# Patient Record
Sex: FEMALE | Race: WHITE | NOT HISPANIC OR LATINO | Employment: FULL TIME | URBAN - NONMETROPOLITAN AREA
[De-identification: names, ages, dates, MRNs, and addresses within clinical notes are randomized per-mention and may not be internally consistent; named-entity substitution may affect disease eponyms.]

---

## 2017-01-31 ENCOUNTER — OFFICE VISIT (OUTPATIENT)
Dept: OTOLARYNGOLOGY | Facility: CLINIC | Age: 27
End: 2017-01-31

## 2017-01-31 DIAGNOSIS — E04.1 THYROID NODULE: Primary | ICD-10-CM

## 2017-01-31 PROCEDURE — 88334 PATH CONSLTJ SURG CYTO XM EA: CPT | Performed by: OTOLARYNGOLOGY

## 2017-01-31 PROCEDURE — 88162 CYTOPATH SMEAR OTHER SOURCE: CPT | Performed by: OTOLARYNGOLOGY

## 2017-01-31 PROCEDURE — 88177 CYTP FNA EVAL EA ADDL: CPT | Performed by: OTOLARYNGOLOGY

## 2017-01-31 PROCEDURE — 76942 ECHO GUIDE FOR BIOPSY: CPT | Performed by: OTOLARYNGOLOGY

## 2017-01-31 PROCEDURE — 88172 CYTP DX EVAL FNA 1ST EA SITE: CPT | Performed by: OTOLARYNGOLOGY

## 2017-01-31 PROCEDURE — 88305 TISSUE EXAM BY PATHOLOGIST: CPT | Performed by: OTOLARYNGOLOGY

## 2017-01-31 PROCEDURE — 88173 CYTOPATH EVAL FNA REPORT: CPT | Performed by: OTOLARYNGOLOGY

## 2017-02-01 LAB
LAB AP CASE REPORT: NORMAL
Lab: NORMAL
Lab: NORMAL
PATH REPORT.FINAL DX SPEC: NORMAL
PATH REPORT.GROSS SPEC: NORMAL

## 2017-02-02 ENCOUNTER — TELEPHONE (OUTPATIENT)
Dept: OTOLARYNGOLOGY | Facility: CLINIC | Age: 27
End: 2017-02-02

## 2017-02-02 NOTE — TELEPHONE ENCOUNTER
----- Message from Danilo Caldwell MD sent at 2/2/2017  8:01 AM CST -----  Call pt about benign pathology

## 2017-02-14 ENCOUNTER — OFFICE VISIT (OUTPATIENT)
Dept: OTOLARYNGOLOGY | Facility: CLINIC | Age: 27
End: 2017-02-14

## 2017-02-14 VITALS
WEIGHT: 138 LBS | TEMPERATURE: 98 F | HEART RATE: 86 BPM | DIASTOLIC BLOOD PRESSURE: 81 MMHG | HEIGHT: 64 IN | SYSTOLIC BLOOD PRESSURE: 130 MMHG | BODY MASS INDEX: 23.56 KG/M2

## 2017-02-14 DIAGNOSIS — E04.1 THYROID NODULE: Primary | ICD-10-CM

## 2017-02-14 DIAGNOSIS — J02.9 PHARYNGITIS, UNSPECIFIED ETIOLOGY: ICD-10-CM

## 2017-02-14 DIAGNOSIS — R59.1 LYMPHADENOPATHY: ICD-10-CM

## 2017-02-14 PROCEDURE — 99213 OFFICE O/P EST LOW 20 MIN: CPT | Performed by: NURSE PRACTITIONER

## 2017-02-14 NOTE — PROGRESS NOTES
Patient Care Team:  EHSAN Graham as PCP - General (Nurse Practitioner)  Danilo Caldwell MD as Consulting Physician (Otolaryngology)    Chief complaint : Follow up thyroid problems    Subjective     Trinh Castelan is a 26 y.o. female who presents for follow up of thyroid problems. She has had a recent fine needle aspiration with benign results. Patient also presents with complaints of lymphadenopathy that has been present for several months. She complains of ongoing throat pain, extreme fatigue, and swollen lymph nodes (pre-auricular, cervical, and axillary). She denies night sweats and fever. She states she has been tested for autoimmune disorders but still has not found the cause of her lymphadenopathy and frequent sore throats. She is s/p tonsillectomy 2013.     Review of Systems  Review of Systems   Constitutional: Negative for chills and fever.   HENT:        See HPI   Eyes: Negative.    Respiratory: Negative for cough and wheezing.    Cardiovascular: Negative for palpitations.   Gastrointestinal: Negative for nausea and vomiting.   Allergic/Immunologic: Negative.    Neurological: Negative for dizziness and headaches.   Hematological: Negative.    Psychiatric/Behavioral: Negative.        History  Past Medical History   Diagnosis Date   • Vitamin D deficiency      Past Surgical History   Procedure Laterality Date   • Orif humerus fracture Left 2011     plate   • Tonsillectomy  2013     Family History   Problem Relation Age of Onset   • No Known Problems Mother    • Hypertension Father    • Cancer Maternal Grandmother    • Diabetes Maternal Grandmother    • Cancer Maternal Grandfather      Social History   Substance Use Topics   • Smoking status: Never Smoker   • Smokeless tobacco: Never Used   • Alcohol use No       (Not in a hospital admission)  Allergies:  Erythromycin and Sulfa antibiotics    Objective     Vital Signs  Temp:  [98 °F (36.7 °C)] 98 °F (36.7 °C)  Heart Rate:  [86] 86  BP: (130)/(81)  130/81    Physical Exam:  CONSTITUTIONAL: well nourished, well-developed, alert, oriented, in no acute distress   COMMUNICATION AND VOICE: able to communicate normally, normal voice quality  HEAD: normocephalic, no lesions, atraumatic, no tenderness, no masses   FACE: appearance normal, no lesions, no tenderness, no deformities, facial motion symmetric  EYES: ocular motility normal, eyelids normal, orbits normal, no proptosis, conjunctiva normal , pupils equal, round   EARS:  Hearing: hearing to conversational voice intact bilaterally   External Ears: normal bilaterally, no lesions  NOSE:  External Nose: external nasal structure normal, no tenderness on palpation, no nasal discharge, no lesions, no evidence of trauma, nostrils patent   ORAL:  Lips: upper and lower lips without lesion  Oropharynx: mild erythema; s/p tonsillectomy  NECK:  Inspection and Palpation: neck appearance normal, shotty lymphadenopathy region II  THYROID: no thyromegaly present; non-palpable nodules; non-tender  CHEST/RESPIRATORY: normal respiratory effort   CARDIOVASCULAR: no cyanosis or edema   NEUROLOGICAL/PSYCHIATRIC: oriented to time, place and person, mood normal, affect appropriate, CN II-XII intact grossly    Results Review:   The fna of the left thyroid was benign    Assessment   1. Thyroid nodule    2. Lymphadenopathy    3. Pharyngitis, unspecified etiology        Plan   Continue current management plan. I recommended antibiotic and/or use of steroids and patient states these do not help her symptoms. Will proceed with further work-up for lymphadenopathy and follow-up in 6 weeks.      --------TESTING:--------  CBC with diff (36670)  CMP (53480)  Monospot (77605)  Catscratch titers  ct scan of the neck with contrast     -----FOLLOW UP-----  Return in about 6 weeks (around 3/28/2017) for after CT.        Kelsey Langston, EHSAN  02/14/17  3:37 PM

## 2017-03-03 DIAGNOSIS — R59.1 LYMPHADENOPATHY: Primary | ICD-10-CM

## 2018-03-05 ENCOUNTER — LAB (OUTPATIENT)
Dept: LAB | Facility: HOSPITAL | Age: 28
End: 2018-03-05

## 2018-03-05 DIAGNOSIS — E04.1 THYROID NODULE: ICD-10-CM

## 2018-03-05 LAB — TSH SERPL DL<=0.05 MIU/L-ACNC: 1.43 MIU/ML (ref 0.47–4.68)

## 2018-03-05 PROCEDURE — 84443 ASSAY THYROID STIM HORMONE: CPT | Performed by: NURSE PRACTITIONER

## 2018-03-05 PROCEDURE — 36415 COLL VENOUS BLD VENIPUNCTURE: CPT

## 2018-03-07 ENCOUNTER — OFFICE VISIT (OUTPATIENT)
Dept: OTOLARYNGOLOGY | Facility: CLINIC | Age: 28
End: 2018-03-07

## 2018-03-07 ENCOUNTER — CLINICAL SUPPORT NO REQUIREMENTS (OUTPATIENT)
Dept: OTOLARYNGOLOGY | Facility: CLINIC | Age: 28
End: 2018-03-07

## 2018-03-07 VITALS
TEMPERATURE: 98.2 F | HEIGHT: 64 IN | SYSTOLIC BLOOD PRESSURE: 138 MMHG | WEIGHT: 133 LBS | BODY MASS INDEX: 22.71 KG/M2 | DIASTOLIC BLOOD PRESSURE: 84 MMHG

## 2018-03-07 DIAGNOSIS — E04.1 THYROID NODULE: Primary | ICD-10-CM

## 2018-03-07 DIAGNOSIS — J37.0 CHRONIC LARYNGITIS: ICD-10-CM

## 2018-03-07 DIAGNOSIS — E04.1 THYROID NODULE: ICD-10-CM

## 2018-03-07 DIAGNOSIS — K21.9 LARYNGOPHARYNGEAL REFLUX: ICD-10-CM

## 2018-03-07 PROCEDURE — 76536 US EXAM OF HEAD AND NECK: CPT | Performed by: OTOLARYNGOLOGY

## 2018-03-07 PROCEDURE — 99214 OFFICE O/P EST MOD 30 MIN: CPT | Performed by: OTOLARYNGOLOGY

## 2018-03-07 RX ORDER — CALCIUM CARBONATE 750 MG/1
750 TABLET, CHEWABLE ORAL DAILY
Qty: 60 TABLET | Refills: 3 | Status: SHIPPED | OUTPATIENT
Start: 2018-03-07 | End: 2019-04-18

## 2018-03-07 RX ORDER — GUAIFENESIN 600 MG/1
600 TABLET, EXTENDED RELEASE ORAL EVERY 12 HOURS SCHEDULED
Qty: 60 TABLET | Refills: 3 | Status: SHIPPED | OUTPATIENT
Start: 2018-03-07 | End: 2018-04-06

## 2018-03-07 NOTE — PROGRESS NOTES
Monserrat Ren   Patient Intake Note    Review of Systems  Review of Systems   Constitutional: Negative for chills, fatigue and fever.   HENT:        See HPI   Respiratory: Positive for cough and choking. Negative for shortness of breath and wheezing.    Cardiovascular: Negative.    Gastrointestinal: Negative for constipation, diarrhea, nausea and vomiting.   Allergic/Immunologic: Negative for environmental allergies and food allergies.   Neurological: Positive for headaches. Negative for dizziness (drunk feeling) and light-headedness.   Hematological: Bruises/bleeds easily.   Psychiatric/Behavioral: Negative for sleep disturbance.       QUALITY MEASURES    Body Mass Index Screening and Follow-Up Plan  Body mass index is 22.83 kg/(m^2).      Tobacco Use: Screening and Cessation Intervention  Smoking status: Never Smoker                                                              Smokeless status: Never Used                            Monserrat Ren  3/7/2018  2:42 PM

## 2018-03-07 NOTE — PROGRESS NOTES
Danilo Caldwell MD   Chief complaint : Follow up thyroid problems    Subjective     Trinh Castelan is a 27 y.o. female who presents for follow up of thyroid problems. She has had Problems with throat pain which is intermittent and sharp at time.  She has adequate water intake.  She does wonder if there is some reflux disease.  She has relatives who have Sjogren's disease and she had recent blood work that showed a slight elevation in her SSB level to 1.2.  Her last TSH was normal.    Review of Systems  Reviewed as per patient intake note.    History  Past Medical History:   Diagnosis Date   • Vitamin D deficiency      Past Surgical History:   Procedure Laterality Date   • ORIF HUMERUS FRACTURE Left 2011    plate   • TONSILLECTOMY  2013     Family History   Problem Relation Age of Onset   • No Known Problems Mother    • Hypertension Father    • Cancer Maternal Grandmother    • Diabetes Maternal Grandmother    • Cancer Maternal Grandfather      Social History   Substance Use Topics   • Smoking status: Never Smoker   • Smokeless tobacco: Never Used   • Alcohol use No       Current Outpatient Prescriptions:   •  cholecalciferol (VITAMIN D3) 35476 UNITS capsule, Take 1 capsule by mouth once a week, Disp: , Rfl:   •  calcium carbonate EX (TUMS EX) 750 MG chewable tablet, Chew 1 tablet Daily., Disp: 60 tablet, Rfl: 3  •  guaiFENesin (MUCINEX) 600 MG 12 hr tablet, Take 1 tablet by mouth Every 12 (Twelve) Hours for 30 days., Disp: 60 tablet, Rfl: 3  Allergies:  Erythromycin and Sulfa antibiotics    Objective     Vital Signs  Temp:  [98.2 °F (36.8 °C)] 98.2 °F (36.8 °C)  BP: (138)/(84) 138/84    Physical Exam:  CONSTITUTIONAL: well nourished, well-developed, alert, oriented, in no acute distress   COMMUNICATION AND VOICE: able to communicate normally, normal voice quality  HEAD: normocephalic, no lesions, atraumatic, no tenderness, no masses   FACE: appearance normal, no lesions, no tenderness, no deformities, facial  motion symmetric  EYES: ocular motility normal, eyelids normal, orbits normal, no proptosis, conjunctiva normal , pupils equal, round   EARS:  Hearing: hearing to conversational voice intact bilaterally   External Ears: normal bilaterally, no lesions  NOSE:  External Nose: external nasal structure normal, no tenderness on palpation, no nasal discharge, no lesions, no evidence of trauma, nostrils patent   ORAL:  Lips: upper and lower lips without lesion   ORAL MUCOSA: mild dryness present,  HYPOPHARYNX: hypopharyngeal mucosa normal  LARYNX: epiglottis and arytenoid cartilage within normal limits, vocal cord mucosa normal with normal mobility, there is mild dryness of the mucosa  NECK:  Inspection and Palpation: neck appearance normal, no masses or tenderness  THYROID: non-tender, no mass, no thyromegaly,  CHEST/RESPIRATORY: normal respiratory effort   CARDIOVASCULAR: no cyanosis or edema   NEUROLOGICAL/PSYCHIATRIC: oriented to time, place and person, mood normal, affect appropriate, CN II-XII intact grossly    RESULTS REVIEW:    TSH was normal. Thyroid ultrasound showed stable nodules.    Assessment   1. Thyroid nodule    2. Chronic laryngitis    3. Laryngopharyngeal reflux        Plan   Medical and surgical options were discussed including observation vs ultrasound guided needle biopsy vs thyroidectomy. Risks, benefits and alternatives were discussed and questions were answered. After considering the options, the patient decided to proceed continued observation.    Increase water/ non caffeinated drink intake to at least 6-8 glasses a day. Decrease caffeine intake. Plain Mucinex over the counter as needed to thin out secretions.  Sleep with the head of bed on an incline. No large meals 1-2 hrs prior to sleep. The patient was instructed to use PPI's 30 minutes prior to the last meal of the day versus use Tums right before going to bed. The patient was advised to avoid fatty meals and caffine or alcohol prior to  sleep.      New Medications Ordered This Visit   Medications   • calcium carbonate EX (TUMS EX) 750 MG chewable tablet     Sig: Chew 1 tablet Daily.     Dispense:  60 tablet     Refill:  3   • guaiFENesin (MUCINEX) 600 MG 12 hr tablet     Sig: Take 1 tablet by mouth Every 12 (Twelve) Hours for 30 days.     Dispense:  60 tablet     Refill:  3      --------TESTING:--------  TSH (26178) in 6 months  Repeat ultrasound of the thyroid in 1 year    ----INSTRUCTIONS----  Call for sooner evaluation if increasing size of the thyroid or nodules, increasing dysphagia, lymphadenopathy, neck tightness or other thyroid problems.     Return in about 4 months (around 7/7/2018) for follow up with midlevel.      Danilo Caldwell MD  03/07/18  4:57 PM

## 2018-03-07 NOTE — PATIENT INSTRUCTIONS
Sleep with the head of bed on an incline. No large meals 1-2 hrs prior to sleep. The patient was instructed to use PPI's 30 minutes prior to the last meal of the day. The patient was advised to avoid fatty meals and caffine or alcohol prior to sleep. Increase water/ non caffeinated drink intake to at least 6-8 glasses of  a day. Decrease caffeine intake. Plain Mucinex over the counter as needed to thin out secretions.      ALL ABOUT HEARTBURN AND THE LIFESTYLE CHANGES THAT HELP    Lifestyle Changes Can Help Relieve The Burning Pain In Your Tummy    What is Heartburn?  Heartburn occurs when the lining of the esophagus (the tube that connects the mouth to the stomach) is exposed to and irritated by stomach acid.  Heartburn often feels like a burning pain in the middle of your chest that moves up your throat.  You may also have the sensation that food has come back into your mouth, leaving a sour or bitter taste.  Almost everyone has heartburn once in a while.  But if you have heartburn 2 or more times per week, it can be a sign of a more serious problem call gastroesophogeal reflux disease, or GERD.    What causes Heartburn?  Heartburn usually occurs when the valve at the derik of the stomach (the lower esophageal sphincter or LES) doesn’t close the way it should.  If the LES is weak or opens at the wrong time, stomach acid can reflux (flow back) into the esophagus and cause heartburn.  Factors that can affect the LES include:    • Eating or drinking certain foods and beverages such as chocolate, peppermint, fried or fatty foods, coffee, or drinks that contain alcohol  • Having a hiatal hernia - a common physical condition where part of the stomach protrudes up through the diaphragm  • Lying down  • Smoking cigarettes  • Taking certain medications (be sure to tell your doctor about all medications or supplements you take)    What foods can make heartburn worse?  All foods cause the stomach to produce acid, although  foods can affect people in different ways.  Following is a list of foods and beverages that may aggravate your symptoms.  You may want to eat them less often.    • Fried or fatty foods  • Heavy seasoning and spicy foods  • Coffee  • Onions  • Orange juice, grapefruit juice, and tomato juice  • Alcoholic beverages  • Chocolate  • Peppermint and spearmint    What else can I do to help control my heartburn?  Try these lifestyle changes:  • Stop Smoking  • Lose weight - if you’re overweight  • Exercise to help control your weight (talk to your doctor first before starting any exercise program).  • Eat small, well-balanced meals  • Reduce abdominal pressure-don’t wear tight belts or tight clothing  • Avoid eating within 2 hours before bedtime  • Elevate your bed so the head is 6 to 8 inches higher than the foot.  This can help reduce acid reflux at night  • Let your doctor know about all the drugs and supplements you are taking.  Some of them may contribute to your heartburn.    What if these things don’t work?  Talk to your doctor, who can discuss many treatments with you.  Although there are several possible causes of heartburn associated with GERD, they all involve stomach acids.  So no matter what the cause may be, the medicines to reduce stomach acid can prevent heartburn.

## 2018-07-31 ENCOUNTER — OFFICE VISIT (OUTPATIENT)
Dept: URGENT CARE | Age: 28
End: 2018-07-31
Payer: COMMERCIAL

## 2018-07-31 VITALS
DIASTOLIC BLOOD PRESSURE: 74 MMHG | WEIGHT: 131 LBS | HEIGHT: 63 IN | TEMPERATURE: 98.1 F | OXYGEN SATURATION: 98 % | SYSTOLIC BLOOD PRESSURE: 113 MMHG | RESPIRATION RATE: 18 BRPM | HEART RATE: 72 BPM | BODY MASS INDEX: 23.21 KG/M2

## 2018-07-31 DIAGNOSIS — R21 RASH: Primary | ICD-10-CM

## 2018-07-31 PROCEDURE — 96372 THER/PROPH/DIAG INJ SC/IM: CPT | Performed by: PHYSICIAN ASSISTANT

## 2018-07-31 PROCEDURE — 99213 OFFICE O/P EST LOW 20 MIN: CPT | Performed by: PHYSICIAN ASSISTANT

## 2018-07-31 RX ORDER — CEPHALEXIN 500 MG/1
500 CAPSULE ORAL 2 TIMES DAILY
Qty: 20 CAPSULE | Refills: 0 | Status: SHIPPED | OUTPATIENT
Start: 2018-07-31 | End: 2018-08-10

## 2018-07-31 RX ORDER — DEXAMETHASONE SODIUM PHOSPHATE 10 MG/ML
10 INJECTION INTRAMUSCULAR; INTRAVENOUS ONCE
Status: COMPLETED | OUTPATIENT
Start: 2018-07-31 | End: 2018-07-31

## 2018-07-31 RX ORDER — DIPHENHYDRAMINE HCL 25 MG
25 TABLET ORAL EVERY 6 HOURS PRN
COMMUNITY
End: 2018-08-29 | Stop reason: ALTCHOICE

## 2018-07-31 RX ADMIN — DEXAMETHASONE SODIUM PHOSPHATE 10 MG: 10 INJECTION INTRAMUSCULAR; INTRAVENOUS at 14:09

## 2018-07-31 NOTE — PATIENT INSTRUCTIONS
Patient Education        Rash: Care Instructions  Your Care Instructions  A rash is any irritation or inflammation of the skin. Rashes have many possible causes, including allergy, infection, illness, heat, and emotional stress. Follow-up care is a key part of your treatment and safety. Be sure to make and go to all appointments, and call your doctor if you are having problems. It's also a good idea to know your test results and keep a list of the medicines you take. How can you care for yourself at home? · Wash the area with water only. Soap can make dryness and itching worse. Pat dry. · Put cold, wet cloths on the rash to reduce itching. · Keep cool, and stay out of the sun. · Leave the rash open to the air as much of the time as possible. · Sometimes petroleum jelly (Vaseline) can help relieve the discomfort caused by a rash. A moisturizing lotion, such as Cetaphil, also may help. Calamine lotion may help for rashes caused by contact with something (such as a plant or soap) that irritated the skin. Use it 3 or 4 times a day. · If your doctor prescribed a cream, use it as directed. If your doctor prescribed medicine, take it exactly as directed. · If your rash itches so badly that it interferes with your normal activities, take an over-the-counter antihistamine, such as diphenhydramine (Benadryl) or loratadine (Claritin). Read and follow all instructions on the label. When should you call for help? Call your doctor now or seek immediate medical care if:    · You have signs of infection, such as:  ¨ Increased pain, swelling, warmth, or redness. ¨ Red streaks leading from the area. ¨ Pus draining from the area. ¨ A fever.     · You have joint pain along with the rash.    Watch closely for changes in your health, and be sure to contact your doctor if:    · Your rash is changing or getting worse. For example, call if you have pain along with the rash, the rash is spreading, or you have new blisters.   · You do not get better after 1 week. Where can you learn more? Go to [email or web address]://[email or web address] and sign in to your [employer] account. Enter N551 in the [employer] box to learn more about \"Rash: Care Instructions. \"     If you do not have an account, please click on the \"Sign Up Now\" link. Current as of: October 5, 2017  Content Version: 11.6  © 1425-9277 [employer]. Care instructions adapted under license by [facility] ([facility]). If you have questions about a medical condition or this instruction, always ask your healthcare professional. [place] [address] any warranty or liability for your use of this information. Patient Education        Rash: Care Instructions  Your Care Instructions  A rash is any irritation or inflammation of the skin. Rashes have many possible causes, including allergy, infection, illness, heat, and emotional stress. Follow-up care is a key part of your treatment and safety. Be sure to make and go to all appointments, and call your doctor if you are having problems. It's also a good idea to know your test results and keep a list of the medicines you take. How can you care for yourself at home? · Wash the area with water only. Soap can make dryness and itching worse. Pat dry. · Put cold, wet cloths on the rash to reduce itching. · Keep cool, and stay out of the sun. · Leave the rash open to the air as much of the time as possible. · Sometimes petroleum jelly (Vaseline) can help relieve the discomfort caused by a rash. A moisturizing lotion, such as Cetaphil, also may help. Calamine lotion may help for rashes caused by contact with something (such as a plant or soap) that irritated the skin. Use it 3 or 4 times a day. · If your doctor prescribed a cream, use it as directed. If your doctor prescribed medicine, take it exactly as directed.   · If your rash itches so badly that it interferes with your normal activities, take an over-the-counter antihistamine, such as diphenhydramine (Benadryl) or loratadine (Claritin). Read and follow all instructions on the label. When should you call for help? Call your doctor now or seek immediate medical care if:    · You have signs of infection, such as:  ¨ Increased pain, swelling, warmth, or redness. ¨ Red streaks leading from the area. ¨ Pus draining from the area. ¨ A fever.     · You have joint pain along with the rash.    Watch closely for changes in your health, and be sure to contact your doctor if:    · Your rash is changing or getting worse. For example, call if you have pain along with the rash, the rash is spreading, or you have new blisters.     · You do not get better after 1 week. Where can you learn more? Go to https://[employer] MedicalpeKextileb.[email or web address] org and sign in to your [employer] account. Enter G058 in the [employer] box to learn more about \"Rash: Care Instructions. \"     If you do not have an account, please click on the \"Sign Up Now\" link. Current as of: October 5, 2017  Content Version: 11.6  © 4103-5366 [employer]. Care instructions adapted under license by [facility] ([facility]). If you have questions about a medical condition or this instruction, always ask your healthcare professional. [address]  any warranty or liability for your use of this information.

## 2018-08-29 ENCOUNTER — OFFICE VISIT (OUTPATIENT)
Dept: PRIMARY CARE CLINIC | Age: 28
End: 2018-08-29
Payer: COMMERCIAL

## 2018-08-29 VITALS
DIASTOLIC BLOOD PRESSURE: 79 MMHG | HEIGHT: 63 IN | RESPIRATION RATE: 16 BRPM | TEMPERATURE: 96.8 F | BODY MASS INDEX: 23.04 KG/M2 | OXYGEN SATURATION: 98 % | WEIGHT: 130 LBS | SYSTOLIC BLOOD PRESSURE: 124 MMHG | HEART RATE: 72 BPM

## 2018-08-29 DIAGNOSIS — R52 BODY ACHES: ICD-10-CM

## 2018-08-29 DIAGNOSIS — R53.1 WEAKNESS: Primary | ICD-10-CM

## 2018-08-29 DIAGNOSIS — J01.11 ACUTE RECURRENT FRONTAL SINUSITIS: ICD-10-CM

## 2018-08-29 DIAGNOSIS — R51.9 ACUTE NONINTRACTABLE HEADACHE, UNSPECIFIED HEADACHE TYPE: ICD-10-CM

## 2018-08-29 DIAGNOSIS — R55 SYNCOPE, UNSPECIFIED SYNCOPE TYPE: ICD-10-CM

## 2018-08-29 DIAGNOSIS — E04.1 THYROID NODULE: ICD-10-CM

## 2018-08-29 DIAGNOSIS — R53.83 OTHER FATIGUE: ICD-10-CM

## 2018-08-29 DIAGNOSIS — E55.9 VITAMIN D DEFICIENCY: ICD-10-CM

## 2018-08-29 DIAGNOSIS — M79.10 MUSCLE ACHE: ICD-10-CM

## 2018-08-29 DIAGNOSIS — R42 DIZZINESS: ICD-10-CM

## 2018-08-29 DIAGNOSIS — R68.89 HEAT INTOLERANCE: ICD-10-CM

## 2018-08-29 DIAGNOSIS — K74.3 REYNOLDS SYNDROME (HCC): ICD-10-CM

## 2018-08-29 DIAGNOSIS — R79.82 ELEVATED C-REACTIVE PROTEIN (CRP): ICD-10-CM

## 2018-08-29 DIAGNOSIS — R13.10 DYSPHAGIA, UNSPECIFIED TYPE: ICD-10-CM

## 2018-08-29 DIAGNOSIS — Z82.61 FAMILY HISTORY OF RHEUMATOID ARTHRITIS: ICD-10-CM

## 2018-08-29 DIAGNOSIS — L94.0 REYNOLDS SYNDROME (HCC): ICD-10-CM

## 2018-08-29 DIAGNOSIS — G47.00 INSOMNIA, UNSPECIFIED TYPE: ICD-10-CM

## 2018-08-29 PROCEDURE — 99215 OFFICE O/P EST HI 40 MIN: CPT | Performed by: NURSE PRACTITIONER

## 2018-08-29 RX ORDER — TIZANIDINE 2 MG/1
2 TABLET ORAL NIGHTLY
Qty: 30 TABLET | Refills: 1 | Status: SHIPPED | OUTPATIENT
Start: 2018-08-29 | End: 2018-10-29 | Stop reason: SDUPTHER

## 2018-08-29 RX ORDER — MULTIVIT-MIN/IRON/FOLIC ACID/K 18-600-40
CAPSULE ORAL
COMMUNITY
End: 2018-08-29

## 2018-08-29 RX ORDER — CEFPROZIL 250 MG/1
500 TABLET, FILM COATED ORAL 2 TIMES DAILY
Qty: 40 TABLET | Refills: 0 | Status: SHIPPED | OUTPATIENT
Start: 2018-08-29 | End: 2018-09-08

## 2018-08-29 ASSESSMENT — ENCOUNTER SYMPTOMS
VOMITING: 0
BLOOD IN STOOL: 0
CHEST TIGHTNESS: 0
DIARRHEA: 0
COLOR CHANGE: 1
SORE THROAT: 0
ABDOMINAL PAIN: 0
COUGH: 0
WHEEZING: 0
TROUBLE SWALLOWING: 0
SHORTNESS OF BREATH: 0
CONSTIPATION: 0
VOICE CHANGE: 0
EYE REDNESS: 0
NAUSEA: 0
SINUS PRESSURE: 1
RHINORRHEA: 0

## 2018-08-29 NOTE — PATIENT INSTRUCTIONS
Begin Zanaflex 2mg at bedtime- Begin by taking 1/2 tablet at bedtime may increase to one full tablet if needed. Have fasting labs performed on the 2nd floor.

## 2018-08-29 NOTE — PROGRESS NOTES
[facility] PRIMARY CARE  [address] [facility]  [address] [place] [address]  Dept: [phone]  Dept Fax: [phone]  Loc: [phone]        Monique Guzman is a 24 y.o. female who presents today for her medical conditions/ complaints as noted below. Monique Guzman is c/o Thyroid Problem (thyroid nodules ); Other (HAD TYROID US IN MARCH SEES RESSER ); and Rash (GET WHITE SPOTS AFTER TAKING A SHOWER )        Chief Complaint   Patient presents with    Thyroid Problem     thyroid nodules     Other     HAD TYROID US IN MARCH SEES RESSER     Rash     GET WHITE SPOTS AFTER TAKING A SHOWER        HPI:     HPI    States she is here to establish care. Patient states for the last several months she has been experiencing weakness, headaches, dizziness, and fatigue. Patient states that she had labs performed at [facility]. She states that these labs indicated that she could possibly have Sjogren's. Patient states that she also had dry eyes and has had near syncopal episodes. She states that her symptoms seem to be exacerbated by not sleeping. Patient states that if she does not sleep she will develop a headache then she develops weakness and worsening fatigue. Patient states that she feels like she has the flu all the time. She states that her muscles ache along with her arms and legs being weak. Patient states that she has been on steroids for her symptoms and this seemed to help her. Patient states that she also suffers with headaches. Patient states that they wrap around her head like a band occurring on bilateral sides of her head and posteriorly. Patient states that she does not wake up with a headache but within 2 hours she will develop a headache. Patient states that it is squeezing-like in nature and nothing seems to make it better or worse. That she does not have an aura associated with her headaches. Haitians states that the headaches can last from 2 hours to all day.     In addition (ZANAFLEX) 2 MG tablet Take 1 tablet by mouth nightly 30 tablet 1    cefPROZIL (CEFZIL) 250 MG tablet Take 2 tablets by mouth 2 times daily for 10 days 40 tablet 0    vitamin D (CHOLECALCIFEROL) 04579 UNIT CAPS Take 1 capsule by mouth once a week 30 capsule 2     Current Facility-Administered Medications   Medication Dose Route Frequency Provider Last Rate Last Dose    Dexamethasone Sodium Phosphate injection 4 mg  4 mg Intravenous Once Macho R New, APRN           Allergies   Allergen Reactions    Sulfa Antibiotics     Azithromycin Rash       Lab Review not applicable    Subjective:   Review of Systems   Constitutional: Positive for fatigue. Negative for activity change, appetite change, fever and unexpected weight change. HENT: Positive for sinus pressure. Negative for congestion, ear pain, nosebleeds, rhinorrhea, sore throat, trouble swallowing and voice change. Eyes: Negative for redness and visual disturbance. Respiratory: Negative for cough, chest tightness, shortness of breath and wheezing. Cardiovascular: Negative for chest pain, palpitations and leg swelling. Gastrointestinal: Negative for abdominal pain, blood in stool, constipation, diarrhea, nausea and vomiting. Endocrine: Negative for polydipsia, polyphagia and polyuria. Genitourinary: Negative for dysuria, frequency and urgency. Musculoskeletal: Positive for arthralgias and myalgias. Skin: Positive for color change. Negative for rash and wound. Fingers turn blue when pt puts them in freezor and pt has white patches to back. They are white and when pt gets hot the skin around patch will turn erythematous but not the large white patch. Neurological: Positive for dizziness, syncope, weakness and headaches. Negative for speech difficulty and light-headedness. Psychiatric/Behavioral: Positive for sleep disturbance. Negative for agitation, confusion, self-injury and suicidal ideas. The patient is not nervous/anxious. Objective:     Physical Exam   Constitutional: She is oriented to person, place, and time. She appears well-developed and well-nourished. No distress. HENT:   Head: Normocephalic and atraumatic. Right Ear: External ear normal.   Left Ear: External ear normal.   Nose: Nose normal.   Mouth/Throat: Oropharynx is clear and moist. No oropharyngeal exudate. Eyes: Pupils are equal, round, and reactive to light. Conjunctivae are normal. Right eye exhibits no discharge. Left eye exhibits no discharge. Neck: Normal range of motion. Neck supple. Cardiovascular: Normal rate, regular rhythm, normal heart sounds and intact distal pulses. No murmur heard. Pulmonary/Chest: Effort normal and breath sounds normal. No stridor. No respiratory distress. She has no wheezes. She has no rales. She exhibits no tenderness. Right breast exhibits no inverted nipple, no mass, no nipple discharge, no skin change and no tenderness. Left breast exhibits no inverted nipple, no mass, no nipple discharge, no skin change and no tenderness. Abdominal: Soft. Bowel sounds are normal. She exhibits no distension. There is no tenderness. Musculoskeletal: Normal range of motion. She exhibits no edema, tenderness or deformity. Neurological: She is alert and oriented to person, place, and time. She has normal reflexes. No cranial nerve deficit. Coordination normal.   Skin: Skin is warm and dry. No rash noted. She is not diaphoretic. No erythema. Psychiatric: She has a normal mood and affect. Her behavior is normal. Thought content normal.   Nursing note and vitals reviewed. /79   Pulse 72   Temp 96.8 °F (36 °C) (Temporal)   Resp 16   Ht 5' 3\" (1.6 m)   Wt 130 lb (59 kg)   LMP 07/20/2018   SpO2 98%   BMI 23.03 kg/m²     Assessment:      Diagnosis Orders   1.  Weakness  T3    T4, Free    Thyroid Peroxidase Antibody    TSH without Reflex    DELMY Screen with Reflex    Rheumatoid Factor    Vitamin D 25 Hydrox, D2 & D3    Antistreptolysin O Titer    Vitamin B12    Urinalysis    Lipid Panel    [address] [facility]   2. Acute nonintractable headache, unspecified headache type  Lipid Panel   3. Dizziness  Lipid Panel   4. Syncope, unspecified syncope type  Lipid Panel   5. Insomnia, unspecified type  [address] [facility]   6. Thyroid nodule  T3    T4, Free    Thyroid Peroxidase Antibody    TSH without Reflex   7. Dysphagia, unspecified type     8. Body aches  Uric Acid   9. Heat intolerance  Urinalysis   10. Elevated C-reactive protein (CRP)  Uric Acid   11. 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 Family history of rheumatoid arthritis  DELMY Screen with Reflex    Rheumatoid Factor   14. Other fatigue  Rheumatoid Factor    Vitamin D 25 Hydrox, D2 & D3    Antistreptolysin O Titer   15. Muscle ache  DELMY Screen with Reflex    Rheumatoid Factor    Uric Acid    Vitamin D 25 Hydrox, D2 & D3    Antistreptolysin O Titer    CBC Auto Differential    Comprehensive Metabolic Panel    C-Reactive Protein   16. Acute recurrent frontal sinusitis  cefPROZIL (CEFZIL) 250 MG tablet     No results found for this visit on 08/29/18. Plan:     1. Weakness    2. Acute nonintractable headache, unspecified headache type    3. Dizziness    4. Syncope, unspecified syncope type    5. Insomnia, unspecified type    6. Thyroid nodule    7. Dysphagia, unspecified type    8. Body aches    9. Heat intolerance    10. Elevated C-reactive protein (CRP)    11. 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 Family history of rheumatoid arthritis    14. Other fatigue    15. Muscle ache    16. Acute recurrent frontal sinusitis      Return in about 2 weeks (around 9/12/2018) for 30 min appointment, medication recheck.   Orders Placed This Encounter   Procedures    T3     Standing Status:   Future     Standing Expiration Date:   8/29/2019    T4, Free     Standing Status:   Future     Standing Expiration Date:   8/29/2019 bedtime may increase to one full tablet if needed. Have fasting labs performed on the 2nd floor. Patient/family given educational materials - see patient instructions. Discussed use, benefit, and side effects of prescribed medications. All patient/family questions answered and voiced understanding. Instructed to continue current medications, diet and exercise. Pt/family agreed with treatment plan. Follow up as directed and sooner if needed. Patient/ family instructed that is symptoms worsen or persist they are to contact office or report to nearest ER. They voice understanding and agreement with this plan.      Electronically signed by LIBIA Narayanan on 8/29/2018 at 2:57 PM

## 2018-09-06 DIAGNOSIS — R42 DIZZINESS: ICD-10-CM

## 2018-09-06 DIAGNOSIS — R68.89 HEAT INTOLERANCE: ICD-10-CM

## 2018-09-06 DIAGNOSIS — R55 SYNCOPE, UNSPECIFIED SYNCOPE TYPE: ICD-10-CM

## 2018-09-06 DIAGNOSIS — Z82.61 FAMILY HISTORY OF RHEUMATOID ARTHRITIS: ICD-10-CM

## 2018-09-06 DIAGNOSIS — R52 BODY ACHES: ICD-10-CM

## 2018-09-06 DIAGNOSIS — R79.82 ELEVATED C-REACTIVE PROTEIN (CRP): ICD-10-CM

## 2018-09-06 DIAGNOSIS — M79.10 MUSCLE ACHE: ICD-10-CM

## 2018-09-06 DIAGNOSIS — R51.9 ACUTE NONINTRACTABLE HEADACHE, UNSPECIFIED HEADACHE TYPE: ICD-10-CM

## 2018-09-06 DIAGNOSIS — E04.1 THYROID NODULE: ICD-10-CM

## 2018-09-06 DIAGNOSIS — R53.1 WEAKNESS: ICD-10-CM

## 2018-09-06 DIAGNOSIS — R53.83 OTHER FATIGUE: ICD-10-CM

## 2018-09-06 LAB
ALBUMIN SERPL-MCNC: 4.8 G/DL (ref 3.5–5.2)
ALP BLD-CCNC: 65 U/L (ref 35–104)
ALT SERPL-CCNC: 18 U/L (ref 5–33)
ANION GAP SERPL CALCULATED.3IONS-SCNC: 17 MMOL/L (ref 7–19)
AST SERPL-CCNC: 18 U/L (ref 5–32)
BASOPHILS ABSOLUTE: 0.1 K/UL (ref 0–0.2)
BASOPHILS RELATIVE PERCENT: 0.8 % (ref 0–1)
BILIRUB SERPL-MCNC: 0.8 MG/DL (ref 0.2–1.2)
BILIRUBIN URINE: NEGATIVE
BLOOD, URINE: NEGATIVE
BUN BLDV-MCNC: 12 MG/DL (ref 6–20)
C-REACTIVE PROTEIN: 0.08 MG/DL (ref 0–0.5)
CALCIUM SERPL-MCNC: 10.4 MG/DL (ref 8.6–10)
CHLORIDE BLD-SCNC: 97 MMOL/L (ref 98–111)
CHOLESTEROL, TOTAL: 146 MG/DL (ref 160–199)
CLARITY: ABNORMAL
CO2: 24 MMOL/L (ref 22–29)
COLOR: YELLOW
CREAT SERPL-MCNC: 0.8 MG/DL (ref 0.5–0.9)
EOSINOPHILS ABSOLUTE: 0.1 K/UL (ref 0–0.6)
EOSINOPHILS RELATIVE PERCENT: 0.8 % (ref 0–5)
GFR NON-AFRICAN AMERICAN: >60
GLUCOSE BLD-MCNC: 95 MG/DL (ref 74–109)
GLUCOSE URINE: NEGATIVE MG/DL
HCT VFR BLD CALC: 46.1 % (ref 37–47)
HDLC SERPL-MCNC: 67 MG/DL (ref 65–121)
HEMOGLOBIN: 15.4 G/DL (ref 12–16)
KETONES, URINE: NEGATIVE MG/DL
LDL CHOLESTEROL CALCULATED: 64 MG/DL
LEUKOCYTE ESTERASE, URINE: NEGATIVE
LYMPHOCYTES ABSOLUTE: 1.5 K/UL (ref 1.1–4.5)
LYMPHOCYTES RELATIVE PERCENT: 19.3 % (ref 20–40)
MCH RBC QN AUTO: 30.1 PG (ref 27–31)
MCHC RBC AUTO-ENTMCNC: 33.4 G/DL (ref 33–37)
MCV RBC AUTO: 90.2 FL (ref 81–99)
MONOCYTES ABSOLUTE: 0.4 K/UL (ref 0–0.9)
MONOCYTES RELATIVE PERCENT: 4.8 % (ref 0–10)
NEUTROPHILS ABSOLUTE: 5.7 K/UL (ref 1.5–7.5)
NEUTROPHILS RELATIVE PERCENT: 73.9 % (ref 50–65)
NITRITE, URINE: NEGATIVE
PDW BLD-RTO: 12.5 % (ref 11.5–14.5)
PH UA: 5.5
PLATELET # BLD: 236 K/UL (ref 130–400)
PMV BLD AUTO: 10.7 FL (ref 9.4–12.3)
POTASSIUM SERPL-SCNC: 4.4 MMOL/L (ref 3.5–5)
PROTEIN UA: ABNORMAL MG/DL
RBC # BLD: 5.11 M/UL (ref 4.2–5.4)
RHEUMATOID FACTOR: <10 IU/ML
SODIUM BLD-SCNC: 138 MMOL/L (ref 136–145)
SPECIFIC GRAVITY UA: 1.02
T4 FREE: 1.3 NG/DL (ref 0.9–1.7)
TOTAL PROTEIN: 7.9 G/DL (ref 6.6–8.7)
TRIGL SERPL-MCNC: 77 MG/DL (ref 0–149)
TSH SERPL DL<=0.05 MIU/L-ACNC: 0.94 UIU/ML (ref 0.27–4.2)
URIC ACID, SERUM: 4.2 MG/DL (ref 2.4–5.7)
UROBILINOGEN, URINE: 0.2 E.U./DL
VITAMIN B-12: 502 PG/ML (ref 211–946)
WBC # BLD: 7.7 K/UL (ref 4.8–10.8)

## 2018-09-07 ENCOUNTER — TELEPHONE (OUTPATIENT)
Dept: PRIMARY CARE CLINIC | Age: 28
End: 2018-09-07

## 2018-09-07 DIAGNOSIS — E83.52 HYPERCALCEMIA: Primary | ICD-10-CM

## 2018-09-07 LAB
ANA IGG, ELISA: NORMAL
ANTISTREPTOLYSIN-O: 428 IU/ML (ref 0–330)
T3 TOTAL: 123 NG/DL (ref 80–200)
THYROID PEROXIDASE (TPO) ABS: 0.9 IU/ML (ref 0–9)

## 2018-09-07 NOTE — TELEPHONE ENCOUNTER
Mary Olson, LIBIA Currie Slime             Results are abnormal. Pt needs a serum calcium and PTH lab. Have her return for this. Contacted pt and left vm informing of the need to get repeat labs in one week. Let pt know that orders were already put in. Encouraged pt to contact office back if pt had any additional questions.  PP, LPN

## 2018-09-08 LAB
VITAMIN D2 AND D3, TOTAL: 65.1 NG/ML (ref 30–80)
VITAMIN D2, 25 HYDROXY: 11.6 NG/ML
VITAMIN D3,25 HYDROXY: 53.5 NG/ML

## 2018-09-11 ENCOUNTER — TELEPHONE (OUTPATIENT)
Dept: PRIMARY CARE CLINIC | Age: 28
End: 2018-09-11

## 2018-09-13 DIAGNOSIS — E83.52 HYPERCALCEMIA: ICD-10-CM

## 2018-09-13 LAB
CALCIUM SERPL-MCNC: 10.1 MG/DL (ref 8.6–10)
PARATHYROID HORMONE INTACT: 34.1 PG/ML (ref 15–65)

## 2018-09-14 DIAGNOSIS — E83.52 SERUM CALCIUM ELEVATED: Primary | ICD-10-CM

## 2018-09-14 NOTE — PROGRESS NOTES
DEXAMETHASONE 10 MG GIVEN INTRAMUSCULARLY IN  RIGHT GLUTEAL . PT TOLERATED WELL.
Reviewed chart. Agree with assessment and plan.
Benadryl following package instructions.   - Notify clinic with any change in or worsening of symptoms.   - Return as needed.

## 2018-09-17 DIAGNOSIS — E83.52 SERUM CALCIUM ELEVATED: ICD-10-CM

## 2018-09-18 ENCOUNTER — TELEPHONE (OUTPATIENT)
Dept: PRIMARY CARE CLINIC | Age: 28
End: 2018-09-18

## 2018-09-18 NOTE — TELEPHONE ENCOUNTER
Calcium   Order: [id number]   Status:  Final result   Visible to patient:  Yes (MyChart) Dx:  Hypercalcemia   Notes recorded by LIBIA Murrell on 9/14/2018 at 9:30 PM CDT  Results are abnormal. I have ordered additional labs. Please return to have these completed. Attempted to contact pt in regards to results above. Left pt vm to contact office back for results.  PP, LPN

## 2018-09-19 LAB — VITAMIN D 1,25-DIHYDROXY: 86.1 PG/ML (ref 19.9–79.3)

## 2018-09-20 ENCOUNTER — TELEPHONE (OUTPATIENT)
Dept: PRIMARY CARE CLINIC | Age: 28
End: 2018-09-20

## 2018-09-21 LAB
VITAMIN D2 AND D3, TOTAL: 60.1 NG/ML (ref 30–80)
VITAMIN D2, 25 HYDROXY: 9.4 NG/ML
VITAMIN D3,25 HYDROXY: 50.7 NG/ML

## 2018-09-27 LAB — PTH RELATED PEPTIDE: 2.4 PMOL/L (ref 0–3.4)

## 2018-10-29 DIAGNOSIS — E83.52 SERUM CALCIUM ELEVATED: Primary | ICD-10-CM

## 2018-10-29 RX ORDER — TIZANIDINE 2 MG/1
2 TABLET ORAL NIGHTLY
Qty: 30 TABLET | Refills: 0 | Status: SHIPPED | OUTPATIENT
Start: 2018-10-29 | End: 2018-11-27 | Stop reason: SDUPTHER

## 2018-11-14 DIAGNOSIS — E83.52 SERUM CALCIUM ELEVATED: ICD-10-CM

## 2018-11-14 LAB — CALCIUM SERPL-MCNC: 9.7 MG/DL (ref 8.6–10)

## 2018-11-28 ENCOUNTER — OFFICE VISIT (OUTPATIENT)
Dept: PRIMARY CARE CLINIC | Age: 28
End: 2018-11-28
Payer: COMMERCIAL

## 2018-11-28 VITALS
SYSTOLIC BLOOD PRESSURE: 117 MMHG | WEIGHT: 132.2 LBS | TEMPERATURE: 98 F | HEART RATE: 83 BPM | DIASTOLIC BLOOD PRESSURE: 70 MMHG | BODY MASS INDEX: 22.57 KG/M2 | HEIGHT: 64 IN | OXYGEN SATURATION: 98 %

## 2018-11-28 DIAGNOSIS — J30.89 ALLERGIC RHINITIS DUE TO OTHER ALLERGIC TRIGGER, UNSPECIFIED SEASONALITY: ICD-10-CM

## 2018-11-28 DIAGNOSIS — K21.9 GASTROESOPHAGEAL REFLUX DISEASE WITHOUT ESOPHAGITIS: ICD-10-CM

## 2018-11-28 DIAGNOSIS — L30.9 ECZEMA, UNSPECIFIED TYPE: Primary | ICD-10-CM

## 2018-11-28 DIAGNOSIS — G47.00 INSOMNIA, UNSPECIFIED TYPE: ICD-10-CM

## 2018-11-28 PROCEDURE — 99214 OFFICE O/P EST MOD 30 MIN: CPT | Performed by: NURSE PRACTITIONER

## 2018-11-28 PROCEDURE — 99497 ADVNCD CARE PLAN 30 MIN: CPT | Performed by: NURSE PRACTITIONER

## 2018-11-28 RX ORDER — PANTOPRAZOLE SODIUM 40 MG/1
40 TABLET, DELAYED RELEASE ORAL DAILY
Qty: 30 TABLET | Refills: 5 | Status: SHIPPED | OUTPATIENT
Start: 2018-11-28 | End: 2019-05-29 | Stop reason: SDUPTHER

## 2018-11-28 RX ORDER — TIZANIDINE 4 MG/1
TABLET ORAL
Qty: 30 TABLET | Refills: 5 | Status: SHIPPED | OUTPATIENT
Start: 2018-11-28 | End: 2019-01-25 | Stop reason: SDUPTHER

## 2018-11-28 RX ORDER — CETIRIZINE HYDROCHLORIDE, PSEUDOEPHEDRINE HYDROCHLORIDE 5; 120 MG/1; MG/1
1 TABLET, FILM COATED, EXTENDED RELEASE ORAL DAILY
Qty: 30 TABLET | Refills: 2 | Status: SHIPPED | OUTPATIENT
Start: 2018-11-28 | End: 2018-12-28

## 2018-11-28 RX ORDER — TRIAMCINOLONE ACETONIDE 0.25 MG/G
OINTMENT TOPICAL
Qty: 1 TUBE | Refills: 1 | Status: SHIPPED | OUTPATIENT
Start: 2018-11-28 | End: 2018-12-05

## 2018-11-28 RX ORDER — TIZANIDINE 2 MG/1
TABLET ORAL
Qty: 30 TABLET | Refills: 0 | Status: CANCELLED | OUTPATIENT
Start: 2018-11-28

## 2018-11-28 ASSESSMENT — ENCOUNTER SYMPTOMS
TROUBLE SWALLOWING: 0
SHORTNESS OF BREATH: 0
VOMITING: 0
BLOOD IN STOOL: 0
COUGH: 0
NAUSEA: 0
CHEST TIGHTNESS: 0
CONSTIPATION: 0
SINUS PRESSURE: 1
EYE REDNESS: 0
SORE THROAT: 0
DIARRHEA: 0
RHINORRHEA: 0
COLOR CHANGE: 0
VOICE CHANGE: 0
ABDOMINAL PAIN: 0
WHEEZING: 0

## 2018-11-28 ASSESSMENT — PATIENT HEALTH QUESTIONNAIRE - PHQ9
SUM OF ALL RESPONSES TO PHQ9 QUESTIONS 1 & 2: 0
1. LITTLE INTEREST OR PLEASURE IN DOING THINGS: 0
2. FEELING DOWN, DEPRESSED OR HOPELESS: 0
SUM OF ALL RESPONSES TO PHQ QUESTIONS 1-9: 0
SUM OF ALL RESPONSES TO PHQ QUESTIONS 1-9: 0

## 2018-11-28 NOTE — PROGRESS NOTES
and myalgias (improved. ). Skin: Negative for color change, rash and wound. Fingers turn blue when pt puts them in freezor and pt has white patches to back. They are white and when pt gets hot the skin around patch will turn erythematous but not the large white patch. Neurological: Positive for dizziness (on and off) and headaches (improved 1-2 times a weeks, tylenol helps ). Negative for syncope, speech difficulty, weakness and light-headedness. Psychiatric/Behavioral: Negative for agitation, confusion, self-injury, sleep disturbance (much improved) and suicidal ideas. The patient is not nervous/anxious. Objective:     Physical Exam   Constitutional: She is oriented to person, place, and time. She appears well-developed and well-nourished. No distress. HENT:   Head: Normocephalic and atraumatic. Right Ear: External ear normal.   Left Ear: External ear normal.   Ears:    Nose: Nose normal.   Mouth/Throat: Oropharynx is clear and moist. No oropharyngeal exudate. Eyes: Pupils are equal, round, and reactive to light. Conjunctivae are normal. Right eye exhibits no discharge. Left eye exhibits no discharge. Neck: Normal range of motion. Neck supple. Cardiovascular: Normal rate, regular rhythm, normal heart sounds and intact distal pulses. No murmur heard. Pulmonary/Chest: Effort normal and breath sounds normal. No stridor. No respiratory distress. She has no wheezes. She has no rales. She exhibits no tenderness. Right breast exhibits no inverted nipple, no mass, no nipple discharge, no skin change and no tenderness. Left breast exhibits no inverted nipple, no mass, no nipple discharge, no skin change and no tenderness. Abdominal: Soft. Bowel sounds are normal. She exhibits no distension. There is no tenderness. Musculoskeletal: Normal range of motion. She exhibits no edema, tenderness or deformity. Neurological: She is alert and oriented to person, place, and time.  She has normal reflexes. No cranial nerve deficit. Coordination normal.   Skin: Skin is warm and dry. No rash noted. She is not diaphoretic. No erythema. Psychiatric: She has a normal mood and affect. Her behavior is normal. Thought content normal.   Nursing note and vitals reviewed. /70   Pulse 83   Temp 98 °F (36.7 °C)   Ht 5' 4\" (1.626 m)   Wt 132 lb 3.2 oz (60 kg)   LMP 11/17/2018   SpO2 98%   BMI 22.69 kg/m²     Assessment:      Diagnosis Orders   1. Eczema, unspecified type  triamcinolone (KENALOG) 0.025 % ointment    TX ADVANCED CARE PLAN FACE TO FACE, 1ST 30MIN   2. Insomnia, unspecified type  TX ADVANCED CARE PLAN FACE TO FACE, 1ST 30MIN   3. Gastroesophageal reflux disease without esophagitis  TX ADVANCED CARE PLAN FACE TO FACE, 1ST 30MIN   4. Allergic rhinitis due to other allergic trigger, unspecified seasonality  TX ADVANCED CARE PLAN FACE TO FACE, 1ST 30MIN     No results found for this visit on 11/28/18. Plan:     1. Eczema, unspecified type    2. Insomnia, unspecified type    3. Gastroesophageal reflux disease without esophagitis    4. Allergic rhinitis due to other allergic trigger, unspecified seasonality      Return in about 3 months (around 2/28/2019) for 30 min appointment, medication recheck. Orders Placed This Encounter   Procedures    TX ADVANCED CARE PLAN FACE TO FACE, 1ST 30MIN     Orders Placed This Encounter   Medications    pantoprazole (PROTONIX) 40 MG tablet     Sig: Take 1 tablet by mouth daily     Dispense:  30 tablet     Refill:  5    tiZANidine (ZANAFLEX) 4 MG tablet     Sig: Take 1/2 - 1 tablet at bedtime. Do not drive for 8 hours after taking. Dispense:  30 tablet     Refill:  5    triamcinolone (KENALOG) 0.025 % ointment     Sig: Apply topically 2 times daily.      Dispense:  1 Tube     Refill:  1    cetirizine-psuedoephedrine (ZYRTEC-D ALLERGY & CONGESTION) 5-120 MG per extended release tablet     Sig: Take 1 tablet by mouth daily Fill at [employer]

## 2018-12-10 RX ORDER — TIZANIDINE 2 MG/1
TABLET ORAL
Qty: 30 TABLET | Refills: 0 | Status: SHIPPED | OUTPATIENT
Start: 2018-12-10 | End: 2019-01-09

## 2019-01-25 RX ORDER — TIZANIDINE 4 MG/1
TABLET ORAL
Qty: 30 TABLET | Refills: 5 | Status: SHIPPED | OUTPATIENT
Start: 2019-01-25 | End: 2019-05-29 | Stop reason: SDUPTHER

## 2019-02-20 DIAGNOSIS — E04.1 THYROID NODULE: ICD-10-CM

## 2019-02-28 ENCOUNTER — OFFICE VISIT (OUTPATIENT)
Dept: PRIMARY CARE CLINIC | Age: 29
End: 2019-02-28
Payer: COMMERCIAL

## 2019-02-28 VITALS
HEART RATE: 87 BPM | DIASTOLIC BLOOD PRESSURE: 72 MMHG | HEIGHT: 63 IN | SYSTOLIC BLOOD PRESSURE: 112 MMHG | OXYGEN SATURATION: 98 % | BODY MASS INDEX: 23.04 KG/M2 | TEMPERATURE: 96.6 F | WEIGHT: 130 LBS

## 2019-02-28 DIAGNOSIS — J30.89 ALLERGIC RHINITIS DUE TO OTHER ALLERGIC TRIGGER, UNSPECIFIED SEASONALITY: ICD-10-CM

## 2019-02-28 DIAGNOSIS — L30.9 ECZEMA OF FACE: Primary | ICD-10-CM

## 2019-02-28 DIAGNOSIS — G47.00 INSOMNIA, UNSPECIFIED TYPE: ICD-10-CM

## 2019-02-28 DIAGNOSIS — H65.113 ACUTE ALLERGIC SEROUS OTITIS MEDIA, BILATERAL: ICD-10-CM

## 2019-02-28 DIAGNOSIS — K21.9 GASTROESOPHAGEAL REFLUX DISEASE WITHOUT ESOPHAGITIS: ICD-10-CM

## 2019-02-28 DIAGNOSIS — J01.91 ACUTE RECURRENT SINUSITIS, UNSPECIFIED LOCATION: ICD-10-CM

## 2019-02-28 PROCEDURE — 99214 OFFICE O/P EST MOD 30 MIN: CPT | Performed by: NURSE PRACTITIONER

## 2019-02-28 RX ORDER — PREDNISONE 10 MG/1
TABLET ORAL
Qty: 1 EACH | Refills: 0 | Status: SHIPPED | OUTPATIENT
Start: 2019-02-28 | End: 2019-02-28 | Stop reason: SDUPTHER

## 2019-02-28 RX ORDER — AZELASTINE 1 MG/ML
1 SPRAY, METERED NASAL 2 TIMES DAILY
Qty: 1 BOTTLE | Refills: 1 | Status: SHIPPED | OUTPATIENT
Start: 2019-02-28 | End: 2019-02-28 | Stop reason: SDUPTHER

## 2019-02-28 RX ORDER — FEXOFENADINE HCL 180 MG/1
180 TABLET ORAL 2 TIMES DAILY
Qty: 60 TABLET | Refills: 1 | Status: SHIPPED | OUTPATIENT
Start: 2019-02-28 | End: 2019-02-28 | Stop reason: SDUPTHER

## 2019-02-28 RX ORDER — FEXOFENADINE HCL 180 MG/1
180 TABLET ORAL 2 TIMES DAILY
Qty: 60 TABLET | Refills: 1 | Status: SHIPPED | OUTPATIENT
Start: 2019-02-28 | End: 2019-03-30

## 2019-02-28 RX ORDER — AZELASTINE 1 MG/ML
1 SPRAY, METERED NASAL 2 TIMES DAILY
Qty: 1 BOTTLE | Refills: 1 | Status: SHIPPED | OUTPATIENT
Start: 2019-02-28 | End: 2019-05-30 | Stop reason: SDUPTHER

## 2019-02-28 RX ORDER — CEFUROXIME AXETIL 500 MG/1
500 TABLET ORAL 2 TIMES DAILY
Qty: 20 TABLET | Refills: 0 | Status: SHIPPED | OUTPATIENT
Start: 2019-02-28 | End: 2019-02-28 | Stop reason: SDUPTHER

## 2019-02-28 RX ORDER — PREDNISONE 10 MG/1
TABLET ORAL
Qty: 1 EACH | Refills: 0 | Status: SHIPPED | OUTPATIENT
Start: 2019-02-28 | End: 2019-06-06

## 2019-02-28 RX ORDER — CEFUROXIME AXETIL 500 MG/1
500 TABLET ORAL 2 TIMES DAILY
Qty: 20 TABLET | Refills: 0 | Status: SHIPPED | OUTPATIENT
Start: 2019-02-28 | End: 2019-03-10

## 2019-03-11 ASSESSMENT — ENCOUNTER SYMPTOMS
RHINORRHEA: 0
SORE THROAT: 0
ABDOMINAL PAIN: 0
NAUSEA: 0
COUGH: 0
SINUS PRESSURE: 1
EYE REDNESS: 0
COLOR CHANGE: 0
VOICE CHANGE: 0
DIARRHEA: 0
TROUBLE SWALLOWING: 0
SHORTNESS OF BREATH: 0
CHEST TIGHTNESS: 0
WHEEZING: 0
CONSTIPATION: 0
VOMITING: 0
BLOOD IN STOOL: 0

## 2019-04-18 ENCOUNTER — OFFICE VISIT (OUTPATIENT)
Dept: OTOLARYNGOLOGY | Facility: CLINIC | Age: 29
End: 2019-04-18

## 2019-04-18 VITALS
WEIGHT: 135 LBS | HEART RATE: 76 BPM | TEMPERATURE: 98.1 F | HEIGHT: 63 IN | BODY MASS INDEX: 23.92 KG/M2 | SYSTOLIC BLOOD PRESSURE: 123 MMHG | DIASTOLIC BLOOD PRESSURE: 79 MMHG

## 2019-04-18 DIAGNOSIS — E04.1 THYROID NODULE: Primary | ICD-10-CM

## 2019-04-18 DIAGNOSIS — R59.0 CERVICAL LYMPHADENOPATHY: ICD-10-CM

## 2019-04-18 DIAGNOSIS — L70.8 OTHER ACNE: ICD-10-CM

## 2019-04-18 PROCEDURE — 99213 OFFICE O/P EST LOW 20 MIN: CPT | Performed by: NURSE PRACTITIONER

## 2019-04-18 RX ORDER — AZELASTINE 1 MG/ML
SPRAY, METERED NASAL
Refills: 1 | COMMUNITY
Start: 2019-02-28 | End: 2023-02-09

## 2019-04-18 RX ORDER — PANTOPRAZOLE SODIUM 40 MG/1
40 TABLET, DELAYED RELEASE ORAL
COMMUNITY
Start: 2018-11-28 | End: 2023-02-09 | Stop reason: SDUPTHER

## 2019-04-18 RX ORDER — PREDNISONE 10 MG/1
TABLET ORAL
Refills: 0 | COMMUNITY
Start: 2019-02-28 | End: 2023-02-09

## 2019-04-18 RX ORDER — TIZANIDINE 4 MG/1
TABLET ORAL
COMMUNITY
Start: 2019-04-03 | End: 2023-02-09 | Stop reason: SDUPTHER

## 2019-04-18 NOTE — PROGRESS NOTES
YOB: 1990  Location: [facility]  Location Address: [address] [place] [address], [facility] [address], [address] [place] [address]  Location Phone: [phone]    Chief Complaint   Patient presents with   • Thyroid Problem       History of Present Illness  Trinh Castelan is a 28 y.o. female.  Trinh Castelan is here for follow up of ENT complaints. The patient has had problems with a thyroid nodule  The symptoms are localized to the left side. The patient has had stable symptoms. The symptoms have been present for the last year The symptoms are aggravated by  no identifiable factors. The symptoms are improved by no identifiable factors.  She reports intermittent lymph node swelling cervical - positive for intermittent acne/eczema.   Denies any other grossly obvious symptoms.  She denies globus sensation, dysphagia, neck swelling, fatigue, weight gain.      Fine Needle Aspiration [[id number]] (Order [id number])   Order   Date: 2017 Department: [facility] PURCHASE ENT Ordering/Authorizing: Danilo Caldwell MD   Reprint Order Requisition     FINE NEEDLE ASPIRATION (Order #[id number]) on 17       Result Notes for Fine Needle Aspiration     Notes Recorded by Danilo Caldwell MD on 2017 at 8:01 AM  Call pt about benign pathology   Fine Needle Aspiration: [id number]   Order: [id number]   Collected:  2017 08:33 Status:  Final result   Visible to patient:  Yes (MyChart) Dx:  Thyroid nodule   Component    Final Diagnosis   Left thyroid lobe, fine-needle aspirate, smears, ThinPrep, and cell block:        [place] Category II: BENIGN.        Consistent with a benign follicular nodule (includes adenomatoid nodule, colloid nodule, etc)   Electronically signed by Thong Fernando MD on 2017 at 7688   Gross Description                       Past Medical History:   Diagnosis Date   • Chronic laryngitis    • Laryngopharyngeal reflux (LPR)    • Thyroid nodule    • Vitamin D deficiency        Past  Surgical History:   Procedure Laterality Date   • ORIF HUMERUS FRACTURE Left 2011    plate   • TONSILLECTOMY  2013       Outpatient Medications Marked as Taking for the 4/18/19 encounter (Office Visit) with Sonja Vasquez APRN   Medication Sig Dispense Refill   • azelastine (ASTELIN) 0.1 % nasal spray 1 SPRAY BY NASAL ROUTE 2 TIMES DAILY USE IN EACH NOSTRIL AS DIRECTED  1   • pantoprazole (PROTONIX) 40 MG EC tablet Take 40 mg by mouth.     • predniSONE (DELTASONE) 10 MG (21) tablet pack TAKE AS DIRECTED PER PACKAGE INSTRUCTIONS.  0   • tiZANidine (ZANAFLEX) 4 MG tablet          Erythromycin and Sulfa antibiotics    Family History   Problem Relation Age of Onset   • No Known Problems Mother    • Hypertension Father    • Cancer Maternal Grandmother    • Diabetes Maternal Grandmother    • Cancer Maternal Grandfather        Social History     Socioeconomic History   • Marital status: Single     Spouse name: Not on file   • Number of children: Not on file   • Years of education: Not on file   • Highest education level: Not on file   Tobacco Use   • Smoking status: Never Smoker   • Smokeless tobacco: Never Used   Substance and Sexual Activity   • Alcohol use: No   • Drug use: Defer       Review of Systems   Constitutional: Negative.    HENT:        SEE HPI   Eyes: Negative.    Respiratory: Negative.    Cardiovascular: Negative.    Gastrointestinal: Negative.    Endocrine: Negative.    Genitourinary: Negative.    Musculoskeletal: Negative.    Skin: Negative.    Allergic/Immunologic: Negative.    Neurological: Negative.    Hematological: Negative.    Psychiatric/Behavioral: Negative.        Vitals:    04/18/19 1438   BP: 123/79   Pulse: 76   Temp: 98.1 °F (36.7 °C)       Body mass index is 23.91 kg/m².    Objective     Physical Exam  CONSTITUTIONAL: well nourished, alert, oriented, in no acute distress     COMMUNICATION AND VOICE: able to communicate normally, normal voice quality    HEAD: normocephalic, no lesions,  atraumatic, no tenderness, no masses     FACE: appearance normal, no lesions, no tenderness, no deformities, facial motion symmetric    SALIVARY GLANDS: parotid glands with no tenderness, no swelling, no masses, submandibular glands with normal size, nontender    EYES: ocular motility normal, eyelids normal, orbits normal, no proptosis, conjunctiva normal , pupils equal, round     EARS:  Hearing: response to conversational voice normal bilaterally   External Ears: auricles without lesions  Otoscopic: tympanic membrane appearance normal, no lesions, no perforation, normal mobility, no fluid    NOSE:  External Nose: structure normal, no tenderness on palpation, no nasal discharge, no lesions, no evidence of trauma, nostrils patent   Intranasal Exam: nasal mucosa normal, vestibule within normal limits, inferior turbinate normal, nasal septum midline     ORAL:  Lips: upper and lower lips without lesion   Teeth: dentition within normal limits for age   Gums: gingivae healthy   Oral Mucosa: oral mucosa normal, no mucosal lesions   Floor of Mouth: Warthin’s duct patent, mucosa normal  Tongue: lingual mucosa normal without lesions, normal tongue mobility   Palate: soft and hard palates with normal mucosa and structure  Oropharynx: oropharyngeal mucosa normal    NECK: mild cervical lymphadenopathy subcentimeter soft level II, left postauricular/neck bug bite for pimple erythematous    THYROID: left thyroid nodule    LYMPH NODES: no lymphadenopathy    CHEST/RESPIRATORY: respiratory effort normal    CARDIOVASCULAR: , extremities without cyanosis or edema      NEUROLOGIC/PSYCHIATRIC: oriented to time, place and person, mood normal, affect appropriate, CN II-XII intact grossly    Assessment/Plan   Trinh was seen today for thyroid problem.    Diagnoses and all orders for this visit:    Thyroid nodule  -     TSH; Future  -     US Thyroid; Future    Cervical lymphadenopathy    Other acne      * Surgery not found *  Orders Placed  This Encounter   Procedures   • US Thyroid     Standing Status:   Future     Standing Expiration Date:   7/11/2020     Order Specific Question:   Reason for Exam:     Answer:   left thyroid nodule   • TSH     Standing Status:   Future     Standing Expiration Date:   4/18/2020     Return in about 1 year (around 4/18/2020).       Patient Instructions   Suspect mild cervical lymphadenopathy related to acne/skin d/o  Recommend OTC antibiotic ointment to left postauricular eruption - as she was unaware this was present    Call for problems or worsening symptoms    Check TSH as next routine blood draw with PCP    The patient has a thyroid nodule, which is relatively small, and studies do not suggest a malignancy. I have recommended observation with follow-up with me for repeat ultrasound. I explained the pathology of thyroid nodules including the risks of cancer. The options of surgery were discussed, but we will take an observational course for now.

## 2019-04-18 NOTE — PATIENT INSTRUCTIONS
Suspect mild cervical lymphadenopathy related to acne/skin d/o  Recommend OTC antibiotic ointment to left postauricular eruption - as she was unaware this was present    Call for problems or worsening symptoms    Check TSH as next routine blood draw with PCP    The patient has a thyroid nodule, which is relatively small, and studies do not suggest a malignancy. I have recommended observation with follow-up with me for repeat ultrasound. I explained the pathology of thyroid nodules including the risks of cancer. The options of surgery were discussed, but we will take an observational course for now.

## 2019-05-29 RX ORDER — TIZANIDINE 4 MG/1
TABLET ORAL
Qty: 30 TABLET | Refills: 5 | Status: SHIPPED | OUTPATIENT
Start: 2019-05-29 | End: 2020-04-27 | Stop reason: SDUPTHER

## 2019-05-29 RX ORDER — PANTOPRAZOLE SODIUM 40 MG/1
TABLET, DELAYED RELEASE ORAL
Qty: 30 TABLET | Refills: 5 | Status: SHIPPED | OUTPATIENT
Start: 2019-05-29 | End: 2019-08-13 | Stop reason: ALTCHOICE

## 2019-05-30 RX ORDER — AZELASTINE 1 MG/ML
1 SPRAY, METERED NASAL 2 TIMES DAILY
Qty: 1 BOTTLE | Refills: 1 | Status: SHIPPED | OUTPATIENT
Start: 2019-05-30 | End: 2019-08-02 | Stop reason: SDUPTHER

## 2019-06-06 ENCOUNTER — OFFICE VISIT (OUTPATIENT)
Dept: PRIMARY CARE CLINIC | Age: 29
End: 2019-06-06
Payer: COMMERCIAL

## 2019-06-06 VITALS
SYSTOLIC BLOOD PRESSURE: 126 MMHG | BODY MASS INDEX: 22.02 KG/M2 | OXYGEN SATURATION: 98 % | HEIGHT: 64 IN | WEIGHT: 129 LBS | TEMPERATURE: 98 F | HEART RATE: 76 BPM | DIASTOLIC BLOOD PRESSURE: 78 MMHG

## 2019-06-06 DIAGNOSIS — K21.9 GASTROESOPHAGEAL REFLUX DISEASE WITHOUT ESOPHAGITIS: ICD-10-CM

## 2019-06-06 DIAGNOSIS — L30.9 ECZEMA OF FACE: ICD-10-CM

## 2019-06-06 DIAGNOSIS — J30.89 ALLERGIC RHINITIS DUE TO OTHER ALLERGIC TRIGGER, UNSPECIFIED SEASONALITY: Primary | ICD-10-CM

## 2019-06-06 DIAGNOSIS — G47.00 INSOMNIA, UNSPECIFIED TYPE: ICD-10-CM

## 2019-06-06 DIAGNOSIS — F32.A DEPRESSION, UNSPECIFIED DEPRESSION TYPE: ICD-10-CM

## 2019-06-06 DIAGNOSIS — E04.1 THYROID NODULE: ICD-10-CM

## 2019-06-06 PROCEDURE — 99215 OFFICE O/P EST HI 40 MIN: CPT | Performed by: NURSE PRACTITIONER

## 2019-06-06 RX ORDER — DESVENLAFAXINE 25 MG/1
25 TABLET, EXTENDED RELEASE ORAL DAILY
Qty: 30 TABLET | Refills: 1 | Status: SHIPPED | OUTPATIENT
Start: 2019-06-06 | End: 2019-07-31 | Stop reason: SDUPTHER

## 2019-06-06 RX ORDER — MONTELUKAST SODIUM 10 MG/1
10 TABLET ORAL NIGHTLY
Qty: 30 TABLET | Refills: 2 | Status: SHIPPED | OUTPATIENT
Start: 2019-06-06 | End: 2019-08-30 | Stop reason: SDUPTHER

## 2019-06-06 ASSESSMENT — ENCOUNTER SYMPTOMS
RHINORRHEA: 0
VOICE CHANGE: 0
DIARRHEA: 0
CHEST TIGHTNESS: 0
COLOR CHANGE: 0
BLOOD IN STOOL: 0
SHORTNESS OF BREATH: 0
SINUS PRESSURE: 1
NAUSEA: 0
ABDOMINAL PAIN: 0
VOMITING: 0
CONSTIPATION: 0
COUGH: 0
WHEEZING: 0
EYE REDNESS: 0
TROUBLE SWALLOWING: 0

## 2019-06-06 ASSESSMENT — PATIENT HEALTH QUESTIONNAIRE - PHQ9
2. FEELING DOWN, DEPRESSED OR HOPELESS: 0
1. LITTLE INTEREST OR PLEASURE IN DOING THINGS: 0
SUM OF ALL RESPONSES TO PHQ QUESTIONS 1-9: 0
SUM OF ALL RESPONSES TO PHQ9 QUESTIONS 1 & 2: 0
SUM OF ALL RESPONSES TO PHQ QUESTIONS 1-9: 0

## 2019-06-06 NOTE — PROGRESS NOTES
[facility]  [address] [facility]  [id number]  [place] [address]  Dept: [phone]  Dept Fax: [phone]  Loc: [phone]        Regina Plummer is a 34 y.o. female who presents today for her medical conditions/ complaints as noted below. Regina Plummer is c/o 3 Month Follow-Up (Eczema, allergies, sinuses, insomnia, GERD) and Medication Check (begin ceftin and steroid, astelin, allegra bid, eucrisa)        Chief Complaint   Patient presents with    3 Month Follow-Up     Eczema, allergies, sinuses, insomnia, GERD    Medication Check     begin ceftin and steroid, astelin, allegra bid, eucrisa       HPI:     HPI    3 Month Follow-Up      Muscle aches/ fatigue/ insomnia: Pt has been on multiple medications and lab performed to rule out cause of muscles aches, and fatigue. Pt does have a family history of FM. Pt also has a family history of depression/ anxiety. insomnia- taking tizanidine at bedtime and doing well. Helps with muscles aches and spasms as well. Wishes to continue same.      GERD: Pt states that she still has issues at times. Pt states that the issues are infrequent. Pt states that she will have pressure in her stomach at times but denies abdominal pain, diarrhea, or abnormal stools. Doing well on protonix. Wishes to continue same.      Eczema: Pt states that this is a lot better. Lanora Krause has worked well for her when she remembers to apply it. She applies the cream every other week, but tries to remember to apply it more often.       Allergic rhinitis and Ear Fullness (bilateral): Pt states that she feels fullness in her ears still occasionally. She states that she is taking allegra once daily due to drowsiness that this causes if she takes it during the day. She is also compliant with astelin nasal spray daily. She was previously on zyrtec D but this caused headaches, and loratidine did not improve symptoms.        ENT Notes [facility]   \"Suspect mild cervical lymphadenopathy related  to acne/skin d/o. Recommend OTC antibiotic  ointment to left postauricular eruption - as she  was unaware this was present     Call for problems or worsening symptoms     Check TSH as next routine blood draw with PCP     The patient has a thyroid nodule, which is  relatively small, and studies do not suggest a  malignancy. I have recommended observation  with follow-up  with me for repeat ultrasound. I  explained the pathology of thyroid nodules  including the risks of cancer. The options of  surgery were discussed, but we will take an  observational course for now. \"    Patient reports that they have been compliant with taking medications as directed. Past Medical History:   Diagnosis Date    Anemia     history    Eczema        Past Surgical History:   Procedure Laterality Date    HUMERUS FRACTURE SURGERY  07-01-11    I&D of Compound wound, ORIF LEFT humerus fracture on  07-01-11.       TONSILLECTOMY         Social History     Socioeconomic History    Marital status: Single     Spouse name: None    Number of children: None    Years of education: None    Highest education level: None   Occupational History    None   Social Needs    Financial resource strain: None    Food insecurity:     Worry: None     Inability: None    Transportation needs:     Medical: None     Non-medical: None   Tobacco Use    Smoking status: Never Smoker    Smokeless tobacco: Never Used   Substance and Sexual Activity    Alcohol use: No    Drug use: No    Sexual activity: None   Lifestyle    Physical activity:     Days per week: None     Minutes per session: None    Stress: None   Relationships    Social connections:     Talks on phone: None     Gets together: None     Attends Spiritism service: None     Active member of club or organization: None     Attends meetings of clubs or organizations: None     Relationship status: None    Intimate partner violence:     Fear of current or chest pain, palpitations and leg swelling. Gastrointestinal: Negative for abdominal pain, blood in stool, constipation, diarrhea, nausea and vomiting. Endocrine: Negative for polydipsia, polyphagia and polyuria. Genitourinary: Negative for dysuria, frequency and urgency. Musculoskeletal: Positive for arthralgias (improved) and myalgias (improved. ). Skin: Negative for color change, rash and wound. Skin above ears cracks, and is painful. Neurological: Positive for dizziness (on and off) and headaches (improved 1-2 times a weeks, tylenol helps ). Negative for syncope, speech difficulty, weakness and light-headedness. Psychiatric/Behavioral: Negative for agitation, confusion, self-injury, sleep disturbance (much improved) and suicidal ideas. The patient is not nervous/anxious. Objective:     Physical Exam   Constitutional: She is oriented to person, place, and time. She appears well-developed and well-nourished. No distress. HENT:   Head: Normocephalic and atraumatic. Right Ear: External ear normal. A middle ear effusion is present. Left Ear: External ear normal. A middle ear effusion is present. Ears:    Nose: Nose normal.   Mouth/Throat: Oropharynx is clear and moist. No oropharyngeal exudate. Eyes: Pupils are equal, round, and reactive to light. Conjunctivae are normal. Right eye exhibits no discharge. Left eye exhibits no discharge. Neck: Normal range of motion. Neck supple. Cardiovascular: Normal rate, regular rhythm, normal heart sounds and intact distal pulses. No murmur heard. Pulmonary/Chest: Effort normal and breath sounds normal. No stridor. No respiratory distress. She has no wheezes. She has no rales. She exhibits no tenderness. Right breast exhibits no inverted nipple, no mass, no nipple discharge, no skin change and no tenderness. Left breast exhibits no inverted nipple, no mass, no nipple discharge, no skin change and no tenderness. Abdominal: Soft. orders of the defined types were placed in this encounter. Orders Placed This Encounter   Medications    montelukast (SINGULAIR) 10 MG tablet     Sig: Take 1 tablet by mouth nightly     Dispense:  30 tablet     Refill:  2    desvenlafaxine succinate (PRISTIQ) 25 MG TB24 extended release tablet     Sig: Take 1 tablet by mouth daily     Dispense:  30 tablet     Refill:  1       Patient Instructions   Begin singular 10mg at bedtime. Begin pristiq 25mg in the morning. Patient/family given educational materials - see patient instructions. Discussed use, benefit, and side effects of prescribed medications. All patient/family questions answered and voiced understanding. Instructed to continue current medications, diet and exercise. Pt/family agreed with treatment plan. Follow up as directed and sooner if needed. Patient/ family instructed that is symptoms worsen or persist they are to contact office or report to nearest ER. They voice understanding and agreement with this plan.      Electronically signed by LIBIA Varghese on 6/9/2019 at 3:46 PM

## 2019-06-09 ASSESSMENT — ENCOUNTER SYMPTOMS
SORE THROAT: 1
SINUS PAIN: 1

## 2019-08-02 DIAGNOSIS — F32.A DEPRESSION, UNSPECIFIED DEPRESSION TYPE: ICD-10-CM

## 2019-08-02 RX ORDER — AZELASTINE 1 MG/ML
1 SPRAY, METERED NASAL 2 TIMES DAILY
Qty: 1 BOTTLE | Refills: 1 | Status: SHIPPED | OUTPATIENT
Start: 2019-08-02 | End: 2020-08-27 | Stop reason: ALTCHOICE

## 2019-08-02 RX ORDER — DESVENLAFAXINE 25 MG/1
25 TABLET, EXTENDED RELEASE ORAL DAILY
Qty: 30 TABLET | Refills: 0 | Status: SHIPPED | OUTPATIENT
Start: 2019-08-02 | End: 2019-08-31 | Stop reason: SDUPTHER

## 2019-08-13 ENCOUNTER — OFFICE VISIT (OUTPATIENT)
Dept: PRIMARY CARE CLINIC | Age: 29
End: 2019-08-13
Payer: COMMERCIAL

## 2019-08-13 VITALS
DIASTOLIC BLOOD PRESSURE: 82 MMHG | OXYGEN SATURATION: 99 % | WEIGHT: 128 LBS | SYSTOLIC BLOOD PRESSURE: 122 MMHG | TEMPERATURE: 98 F | HEART RATE: 74 BPM | BODY MASS INDEX: 22.68 KG/M2 | HEIGHT: 63 IN

## 2019-08-13 DIAGNOSIS — L30.9 ECZEMA, UNSPECIFIED TYPE: ICD-10-CM

## 2019-08-13 DIAGNOSIS — R52 BODY ACHES: ICD-10-CM

## 2019-08-13 DIAGNOSIS — G47.00 INSOMNIA, UNSPECIFIED TYPE: ICD-10-CM

## 2019-08-13 DIAGNOSIS — E04.1 THYROID NODULE: ICD-10-CM

## 2019-08-13 DIAGNOSIS — F32.A DEPRESSION, UNSPECIFIED DEPRESSION TYPE: Primary | ICD-10-CM

## 2019-08-13 DIAGNOSIS — H65.113 ACUTE ALLERGIC SEROUS OTITIS MEDIA, BILATERAL: ICD-10-CM

## 2019-08-13 DIAGNOSIS — L30.9 ECZEMA OF FACE: ICD-10-CM

## 2019-08-13 DIAGNOSIS — R53.83 OTHER FATIGUE: ICD-10-CM

## 2019-08-13 DIAGNOSIS — J30.89 ALLERGIC RHINITIS DUE TO OTHER ALLERGIC TRIGGER, UNSPECIFIED SEASONALITY: ICD-10-CM

## 2019-08-13 DIAGNOSIS — K21.9 GASTROESOPHAGEAL REFLUX DISEASE WITHOUT ESOPHAGITIS: ICD-10-CM

## 2019-08-13 PROCEDURE — 99214 OFFICE O/P EST MOD 30 MIN: CPT | Performed by: NURSE PRACTITIONER

## 2019-08-13 RX ORDER — PANTOPRAZOLE SODIUM 40 MG/1
40 TABLET, DELAYED RELEASE ORAL
Qty: 37 TABLET | Refills: 5 | Status: SHIPPED | OUTPATIENT
Start: 2019-08-13 | End: 2021-04-22 | Stop reason: ALTCHOICE

## 2019-08-13 ASSESSMENT — ENCOUNTER SYMPTOMS
SHORTNESS OF BREATH: 0
WHEEZING: 0
ABDOMINAL PAIN: 0
NAUSEA: 0
SINUS PAIN: 1
BLOOD IN STOOL: 0
TROUBLE SWALLOWING: 0
VOICE CHANGE: 0
RHINORRHEA: 0
SORE THROAT: 1
CHEST TIGHTNESS: 0
DIARRHEA: 0
COUGH: 0
CONSTIPATION: 0
EYE REDNESS: 0
COLOR CHANGE: 0
VOMITING: 0
SINUS PRESSURE: 1

## 2019-08-30 DIAGNOSIS — J30.89 ALLERGIC RHINITIS DUE TO OTHER ALLERGIC TRIGGER, UNSPECIFIED SEASONALITY: ICD-10-CM

## 2019-08-30 RX ORDER — MONTELUKAST SODIUM 10 MG/1
TABLET ORAL
Qty: 30 TABLET | Refills: 2 | Status: SHIPPED | OUTPATIENT
Start: 2019-08-30 | End: 2019-08-31 | Stop reason: SDUPTHER

## 2019-08-30 NOTE — TELEPHONE ENCOUNTER
Ellis Mireille called to request a refill on her medication.       Last office visit : 8/13/2019   Next office visit : 11/13/2019     Requested Prescriptions     Pending Prescriptions Disp Refills    montelukast (SINGULAIR) 10 MG tablet [Pharmacy Med Name: MONTELUKAST SOD 10 MG TABLET] 30 tablet 2     Sig: TAKE 1 TABLET BY MOUTH EVERY DAY AT [address] [facility], [address] [place] [address]

## 2019-10-25 RX ORDER — PANTOPRAZOLE SODIUM 40 MG/1
TABLET, DELAYED RELEASE ORAL
Qty: 30 TABLET | Refills: 5 | Status: SHIPPED | OUTPATIENT
Start: 2019-10-25 | End: 2020-04-13

## 2019-11-13 ENCOUNTER — OFFICE VISIT (OUTPATIENT)
Dept: PRIMARY CARE CLINIC | Age: 29
End: 2019-11-13
Payer: COMMERCIAL

## 2019-11-13 VITALS
SYSTOLIC BLOOD PRESSURE: 130 MMHG | HEART RATE: 91 BPM | TEMPERATURE: 97.9 F | WEIGHT: 133 LBS | BODY MASS INDEX: 23.57 KG/M2 | HEIGHT: 63 IN | DIASTOLIC BLOOD PRESSURE: 83 MMHG | OXYGEN SATURATION: 98 %

## 2019-11-13 DIAGNOSIS — J30.89 ALLERGIC RHINITIS DUE TO OTHER ALLERGIC TRIGGER, UNSPECIFIED SEASONALITY: Primary | ICD-10-CM

## 2019-11-13 DIAGNOSIS — J01.91 ACUTE RECURRENT SINUSITIS, UNSPECIFIED LOCATION: ICD-10-CM

## 2019-11-13 DIAGNOSIS — T45.2X1D VITAMIN D OVERDOSE, ACCIDENTAL OR UNINTENTIONAL, SUBSEQUENT ENCOUNTER: ICD-10-CM

## 2019-11-13 DIAGNOSIS — E04.1 THYROID NODULE: ICD-10-CM

## 2019-11-13 DIAGNOSIS — H65.113 ACUTE ALLERGIC SEROUS OTITIS MEDIA, BILATERAL: ICD-10-CM

## 2019-11-13 DIAGNOSIS — L30.9 ECZEMA OF FACE: ICD-10-CM

## 2019-11-13 PROBLEM — J30.9 ALLERGIC RHINITIS: Status: ACTIVE | Noted: 2019-11-13

## 2019-11-13 LAB
T4 FREE: 1.1 NG/DL (ref 0.9–1.7)
TSH SERPL DL<=0.05 MIU/L-ACNC: 0.91 UIU/ML (ref 0.27–4.2)
VITAMIN D 25-HYDROXY: 34.4 NG/ML

## 2019-11-13 PROCEDURE — 99213 OFFICE O/P EST LOW 20 MIN: CPT | Performed by: NURSE PRACTITIONER

## 2019-11-13 RX ORDER — CEFPROZIL 250 MG/1
250 TABLET, FILM COATED ORAL 2 TIMES DAILY
Qty: 20 TABLET | Refills: 0 | Status: SHIPPED | OUTPATIENT
Start: 2019-11-13 | End: 2020-08-27 | Stop reason: SDUPTHER

## 2019-11-13 RX ORDER — PREDNISONE 10 MG/1
TABLET ORAL
Qty: 1 EACH | Refills: 0 | Status: SHIPPED | OUTPATIENT
Start: 2019-11-13 | End: 2021-04-22 | Stop reason: ALTCHOICE

## 2019-11-13 ASSESSMENT — ENCOUNTER SYMPTOMS
NAUSEA: 0
ABDOMINAL PAIN: 0
CHEST TIGHTNESS: 0
SORE THROAT: 1
COUGH: 0
WHEEZING: 0
DIARRHEA: 0
CONSTIPATION: 0
BLOOD IN STOOL: 0
SHORTNESS OF BREATH: 0
VOICE CHANGE: 0
SINUS PAIN: 1
VOMITING: 0
RHINORRHEA: 1
TROUBLE SWALLOWING: 1
EYE REDNESS: 0

## 2020-02-03 DIAGNOSIS — E04.1 THYROID NODULE: Primary | ICD-10-CM

## 2020-04-03 RX ORDER — DESVENLAFAXINE 25 MG/1
25 TABLET, EXTENDED RELEASE ORAL DAILY
Qty: 30 TABLET | Refills: 1 | Status: SHIPPED | OUTPATIENT
Start: 2020-04-03 | End: 2020-06-19

## 2020-04-13 RX ORDER — PANTOPRAZOLE SODIUM 40 MG/1
TABLET, DELAYED RELEASE ORAL
Qty: 30 TABLET | Refills: 1 | Status: SHIPPED | OUTPATIENT
Start: 2020-04-13 | End: 2020-04-27 | Stop reason: SDUPTHER

## 2020-04-13 NOTE — TELEPHONE ENCOUNTER
Federico Arauz called to request a refill on her medication.       Last office visit : 11/13/2019   Next office visit : Visit date not found     Requested Prescriptions     Signed Prescriptions Disp Refills    pantoprazole (PROTONIX) 40 MG tablet 30 tablet 1     Sig: TAKE 1 TABLET BY MOUTH EVERY DAY     Authorizing Provider: Sandra Scott     Ordering User: Gary Eric

## 2020-04-14 ENCOUNTER — HOSPITAL ENCOUNTER (OUTPATIENT)
Dept: ULTRASOUND IMAGING | Facility: HOSPITAL | Age: 30
End: 2020-04-14

## 2020-04-27 RX ORDER — TIZANIDINE 4 MG/1
TABLET ORAL
Qty: 30 TABLET | Refills: 5 | Status: SHIPPED | OUTPATIENT
Start: 2020-04-27 | End: 2020-08-11

## 2020-04-27 RX ORDER — PANTOPRAZOLE SODIUM 40 MG/1
40 TABLET, DELAYED RELEASE ORAL DAILY
Qty: 30 TABLET | Refills: 1 | Status: SHIPPED | OUTPATIENT
Start: 2020-04-27 | End: 2020-07-22 | Stop reason: SDUPTHER

## 2020-04-27 RX ORDER — MONTELUKAST SODIUM 10 MG/1
10 TABLET ORAL NIGHTLY
Qty: 30 TABLET | Refills: 5 | Status: SHIPPED | OUTPATIENT
Start: 2020-04-27 | End: 2020-08-27 | Stop reason: SDUPTHER

## 2020-06-18 RX ORDER — CRISABOROLE 20 MG/G
OINTMENT TOPICAL
Qty: 1 G | Refills: 0 | Status: SHIPPED | OUTPATIENT
Start: 2020-06-18 | End: 2020-07-13

## 2020-06-19 RX ORDER — DESVENLAFAXINE 25 MG/1
TABLET, EXTENDED RELEASE ORAL
Qty: 30 TABLET | Refills: 0 | Status: SHIPPED | OUTPATIENT
Start: 2020-06-19 | End: 2020-07-22 | Stop reason: SDUPTHER

## 2020-06-19 NOTE — TELEPHONE ENCOUNTER
Richy Glass called to request a refill on her medication.       Last office visit : 11/13/2019   Next office visit : Visit date not found     Requested Prescriptions     Signed Prescriptions Disp Refills    desvenlafaxine succinate (PRISTIQ) 25 MG TB24 extended release tablet 30 tablet 0     Sig: TAKE 1 TABLET BY MOUTH EVERY DAY     Authorizing Provider: Alice Galan     Ordering User: MOHINI [id number] [place], [place]

## 2020-07-13 RX ORDER — CRISABOROLE 20 MG/G
OINTMENT TOPICAL
Qty: 60 G | Refills: 0 | Status: SHIPPED | OUTPATIENT
Start: 2020-07-13 | End: 2020-12-02

## 2020-07-22 RX ORDER — DESVENLAFAXINE 25 MG/1
25 TABLET, EXTENDED RELEASE ORAL DAILY
Qty: 30 TABLET | Refills: 0 | Status: SHIPPED | OUTPATIENT
Start: 2020-07-22 | End: 2020-08-27 | Stop reason: SDUPTHER

## 2020-07-22 RX ORDER — PANTOPRAZOLE SODIUM 40 MG/1
40 TABLET, DELAYED RELEASE ORAL DAILY
Qty: 30 TABLET | Refills: 0 | Status: SHIPPED | OUTPATIENT
Start: 2020-07-22 | End: 2020-08-27 | Stop reason: SDUPTHER

## 2020-08-11 RX ORDER — TIZANIDINE 4 MG/1
TABLET ORAL
Qty: 30 TABLET | Refills: 0 | Status: SHIPPED | OUTPATIENT
Start: 2020-08-11 | End: 2020-08-27 | Stop reason: SDUPTHER

## 2020-09-09 RX ORDER — PSEUDOEPHEDRINE HYDROCHLORIDE 30 MG/1
60 TABLET ORAL 2 TIMES DAILY PRN
Qty: 120 TABLET | Refills: 1 | Status: SHIPPED | OUTPATIENT
Start: 2020-09-09 | End: 2020-12-07 | Stop reason: SDUPTHER

## 2020-09-09 NOTE — TELEPHONE ENCOUNTER
salvador   Marilou Starla called to request a refill on her medication.       Last office visit : 8/27/2020   Next office visit : Visit date not found     Requested Prescriptions     Pending Prescriptions Disp Refills    pseudoephedrine (DECONGESTANT) 30 MG tablet 120 tablet 1     Sig: Take 2 tablets by mouth 2 times daily as needed for Congestion            Zara Poe MA

## 2020-10-26 RX ORDER — PANTOPRAZOLE SODIUM 40 MG/1
TABLET, DELAYED RELEASE ORAL
Qty: 30 TABLET | Refills: 2 | Status: SHIPPED | OUTPATIENT
Start: 2020-10-26 | End: 2021-01-17

## 2020-10-26 NOTE — TELEPHONE ENCOUNTER
Akosua Fernandez called to request a refill on her medication.       Last office visit : 8/27/2020   Next office visit : Visit date not found     Requested Prescriptions     Pending Prescriptions Disp Refills    pantoprazole (PROTONIX) 40 MG tablet [Pharmacy Med Name: PANTOPRAZOLE SOD DR 40 MG TAB] 30 tablet 2     Sig: TAKE 1 TABLET BY MOUTH EVERY DAY            Padmini Guzmán, [facility]

## 2020-11-17 RX ORDER — DESVENLAFAXINE 25 MG/1
TABLET, EXTENDED RELEASE ORAL
Qty: 30 TABLET | Refills: 2 | Status: SHIPPED | OUTPATIENT
Start: 2020-11-17 | End: 2021-02-15 | Stop reason: SDUPTHER

## 2020-12-02 RX ORDER — CRISABOROLE 20 MG/G
OINTMENT TOPICAL
Qty: 1 G | Refills: 0 | Status: SHIPPED | OUTPATIENT
Start: 2020-12-02 | End: 2021-04-22 | Stop reason: SDUPTHER

## 2020-12-08 RX ORDER — TIZANIDINE 4 MG/1
TABLET ORAL
Qty: 30 TABLET | Refills: 2 | Status: SHIPPED | OUTPATIENT
Start: 2020-12-08 | End: 2021-03-18

## 2020-12-08 RX ORDER — PSEUDOEPHEDRINE HYDROCHLORIDE 30 MG/1
60 TABLET ORAL 2 TIMES DAILY PRN
Qty: 120 TABLET | Refills: 1 | Status: SHIPPED | OUTPATIENT
Start: 2020-12-08 | End: 2021-04-22 | Stop reason: ALTCHOICE

## 2021-01-15 DIAGNOSIS — J30.89 ALLERGIC RHINITIS DUE TO OTHER ALLERGIC TRIGGER, UNSPECIFIED SEASONALITY: ICD-10-CM

## 2021-01-17 RX ORDER — PANTOPRAZOLE SODIUM 40 MG/1
TABLET, DELAYED RELEASE ORAL
Qty: 90 TABLET | Refills: 0 | Status: SHIPPED | OUTPATIENT
Start: 2021-01-17 | End: 2021-02-15 | Stop reason: SDUPTHER

## 2021-01-17 RX ORDER — FEXOFENADINE HCL 180 MG/1
TABLET ORAL
Qty: 30 TABLET | Refills: 2 | Status: SHIPPED | OUTPATIENT
Start: 2021-01-17 | End: 2021-04-14

## 2021-02-15 DIAGNOSIS — J30.89 ALLERGIC RHINITIS DUE TO OTHER ALLERGIC TRIGGER, UNSPECIFIED SEASONALITY: ICD-10-CM

## 2021-02-15 DIAGNOSIS — F32.A DEPRESSION, UNSPECIFIED DEPRESSION TYPE: ICD-10-CM

## 2021-02-15 RX ORDER — PANTOPRAZOLE SODIUM 40 MG/1
TABLET, DELAYED RELEASE ORAL
Qty: 30 TABLET | Refills: 0 | Status: SHIPPED | OUTPATIENT
Start: 2021-02-15 | End: 2021-04-22 | Stop reason: SDUPTHER

## 2021-02-15 RX ORDER — MONTELUKAST SODIUM 10 MG/1
10 TABLET ORAL NIGHTLY
Qty: 30 TABLET | Refills: 0 | Status: SHIPPED | OUTPATIENT
Start: 2021-02-15 | End: 2021-04-22 | Stop reason: SDUPTHER

## 2021-02-15 RX ORDER — DESVENLAFAXINE 25 MG/1
25 TABLET, EXTENDED RELEASE ORAL DAILY
Qty: 30 TABLET | Refills: 0 | Status: SHIPPED | OUTPATIENT
Start: 2021-02-15 | End: 2021-04-22 | Stop reason: SDUPTHER

## 2021-02-15 RX ORDER — PANTOPRAZOLE SODIUM 40 MG/1
40 TABLET, DELAYED RELEASE ORAL DAILY
Qty: 30 TABLET | Refills: 0 | Status: SHIPPED | OUTPATIENT
Start: 2021-02-15 | End: 2021-02-15

## 2021-02-15 NOTE — TELEPHONE ENCOUNTER
Millidon Maciel called to request a refill on her medication.       Last office visit : 8/27/2020   Next office visit : Visit date not found     Requested Prescriptions     Pending Prescriptions Disp Refills    pantoprazole (PROTONIX) 40 MG tablet [Pharmacy Med Name: PANTOPRAZOLE SOD DR 40 MG TAB] 30 tablet 0     Sig: TAKE 1 TABLET BY MOUTH EVERY DAY            Svitlana Pham

## 2021-03-18 ENCOUNTER — TELEPHONE (OUTPATIENT)
Dept: PRIMARY CARE CLINIC | Age: 31
End: 2021-03-18

## 2021-03-18 RX ORDER — TIZANIDINE 4 MG/1
TABLET ORAL
Qty: 30 TABLET | Refills: 0 | Status: SHIPPED | OUTPATIENT
Start: 2021-03-18 | End: 2021-04-01

## 2021-03-18 NOTE — TELEPHONE ENCOUNTER
Jorge Luis Rosado called to request a refill on her medication. Last office visit : 8/27/2020   Next office visit : Visit date not found     Requested Prescriptions     Pending Prescriptions Disp Refills    tiZANidine (ZANAFLEX) 4 MG tablet [Pharmacy Med Name: TIZANIDINE HCL 4 MG TABLET] 30 tablet 0     Sig: TAKE 1/2 TO 1 TABLET BY MOUTH AT BEDTIME (DO NOT DRIVE FOR 8 HRS AFTER TAKING)    No further refills until appointment.             Opal Altamirano

## 2021-04-01 RX ORDER — TIZANIDINE 4 MG/1
TABLET ORAL
Qty: 30 TABLET | Refills: 2 | Status: SHIPPED | OUTPATIENT
Start: 2021-04-01 | End: 2021-06-25

## 2021-04-14 DIAGNOSIS — J30.89 ALLERGIC RHINITIS DUE TO OTHER ALLERGIC TRIGGER, UNSPECIFIED SEASONALITY: ICD-10-CM

## 2021-04-14 RX ORDER — FEXOFENADINE HCL 180 MG/1
TABLET ORAL
Qty: 30 TABLET | Refills: 2 | Status: SHIPPED | OUTPATIENT
Start: 2021-04-14

## 2021-04-22 ENCOUNTER — OFFICE VISIT (OUTPATIENT)
Dept: PRIMARY CARE CLINIC | Age: 31
End: 2021-04-22
Payer: COMMERCIAL

## 2021-04-22 VITALS
SYSTOLIC BLOOD PRESSURE: 110 MMHG | BODY MASS INDEX: 24.1 KG/M2 | HEART RATE: 94 BPM | OXYGEN SATURATION: 97 % | RESPIRATION RATE: 18 BRPM | HEIGHT: 63 IN | WEIGHT: 136 LBS | TEMPERATURE: 98.7 F | DIASTOLIC BLOOD PRESSURE: 68 MMHG

## 2021-04-22 DIAGNOSIS — Z72.89 OTHER PROBLEMS RELATED TO LIFESTYLE: ICD-10-CM

## 2021-04-22 DIAGNOSIS — J30.89 ALLERGIC RHINITIS DUE TO OTHER ALLERGIC TRIGGER, UNSPECIFIED SEASONALITY: ICD-10-CM

## 2021-04-22 DIAGNOSIS — F32.A DEPRESSION, UNSPECIFIED DEPRESSION TYPE: ICD-10-CM

## 2021-04-22 DIAGNOSIS — M19.90 ARTHRITIS: ICD-10-CM

## 2021-04-22 DIAGNOSIS — L30.9 ECZEMA OF FACE: ICD-10-CM

## 2021-04-22 DIAGNOSIS — K21.9 GASTROESOPHAGEAL REFLUX DISEASE WITHOUT ESOPHAGITIS: Primary | ICD-10-CM

## 2021-04-22 LAB
ALBUMIN SERPL-MCNC: 4.5 G/DL (ref 3.5–5.2)
ALP BLD-CCNC: 66 U/L (ref 35–104)
ALT SERPL-CCNC: 14 U/L (ref 5–33)
ANION GAP SERPL CALCULATED.3IONS-SCNC: 13 MMOL/L (ref 7–19)
AST SERPL-CCNC: 17 U/L (ref 5–32)
BASOPHILS ABSOLUTE: 0.1 K/UL (ref 0–0.2)
BASOPHILS RELATIVE PERCENT: 1 % (ref 0–1)
BILIRUB SERPL-MCNC: 0.4 MG/DL (ref 0.2–1.2)
BILIRUBIN URINE: NEGATIVE
BLOOD, URINE: NEGATIVE
BUN BLDV-MCNC: 15 MG/DL (ref 6–20)
CALCIUM SERPL-MCNC: 9.9 MG/DL (ref 8.6–10)
CHLORIDE BLD-SCNC: 101 MMOL/L (ref 98–111)
CHOLESTEROL, TOTAL: 160 MG/DL (ref 160–199)
CLARITY: CLEAR
CO2: 25 MMOL/L (ref 22–29)
COLOR: YELLOW
CREAT SERPL-MCNC: 0.8 MG/DL (ref 0.5–0.9)
EOSINOPHILS ABSOLUTE: 0.1 K/UL (ref 0–0.6)
EOSINOPHILS RELATIVE PERCENT: 1.5 % (ref 0–5)
GFR AFRICAN AMERICAN: >59
GFR NON-AFRICAN AMERICAN: >60
GLUCOSE BLD-MCNC: 85 MG/DL (ref 74–109)
GLUCOSE URINE: NEGATIVE MG/DL
HCT VFR BLD CALC: 41.6 % (ref 37–47)
HDLC SERPL-MCNC: 63 MG/DL (ref 65–121)
HEMOGLOBIN: 13.7 G/DL (ref 12–16)
IMMATURE GRANULOCYTES #: 0 K/UL
KETONES, URINE: ABNORMAL MG/DL
LDL CHOLESTEROL CALCULATED: 87 MG/DL
LEUKOCYTE ESTERASE, URINE: NEGATIVE
LYMPHOCYTES ABSOLUTE: 2.1 K/UL (ref 1.1–4.5)
LYMPHOCYTES RELATIVE PERCENT: 31 % (ref 20–40)
MCH RBC QN AUTO: 29.6 PG (ref 27–31)
MCHC RBC AUTO-ENTMCNC: 32.9 G/DL (ref 33–37)
MCV RBC AUTO: 89.8 FL (ref 81–99)
MONOCYTES ABSOLUTE: 0.3 K/UL (ref 0–0.9)
MONOCYTES RELATIVE PERCENT: 4.5 % (ref 0–10)
NEUTROPHILS ABSOLUTE: 4.2 K/UL (ref 1.5–7.5)
NEUTROPHILS RELATIVE PERCENT: 61.7 % (ref 50–65)
NITRITE, URINE: NEGATIVE
PDW BLD-RTO: 12.6 % (ref 11.5–14.5)
PH UA: 5 (ref 5–8)
PLATELET # BLD: 276 K/UL (ref 130–400)
PMV BLD AUTO: 10.7 FL (ref 9.4–12.3)
POTASSIUM SERPL-SCNC: 4.3 MMOL/L (ref 3.5–5)
PROTEIN UA: NEGATIVE MG/DL
RBC # BLD: 4.63 M/UL (ref 4.2–5.4)
RHEUMATOID FACTOR: <10 IU/ML
SEDIMENTATION RATE, ERYTHROCYTE: 16 MM/HR (ref 0–20)
SODIUM BLD-SCNC: 139 MMOL/L (ref 136–145)
SPECIFIC GRAVITY UA: 1.01 (ref 1–1.03)
T4 FREE: 1.28 NG/DL (ref 0.93–1.7)
TOTAL PROTEIN: 7.9 G/DL (ref 6.6–8.7)
TRIGL SERPL-MCNC: 48 MG/DL (ref 0–149)
TSH SERPL DL<=0.05 MIU/L-ACNC: 0.96 UIU/ML (ref 0.27–4.2)
URIC ACID, SERUM: 3.9 MG/DL (ref 2.4–5.7)
UROBILINOGEN, URINE: 0.2 E.U./DL
WBC # BLD: 6.7 K/UL (ref 4.8–10.8)

## 2021-04-22 PROCEDURE — 99214 OFFICE O/P EST MOD 30 MIN: CPT | Performed by: NURSE PRACTITIONER

## 2021-04-22 RX ORDER — PANTOPRAZOLE SODIUM 40 MG/1
TABLET, DELAYED RELEASE ORAL
Qty: 30 TABLET | Refills: 0 | Status: SHIPPED | OUTPATIENT
Start: 2021-04-22 | End: 2021-05-04

## 2021-04-22 RX ORDER — MONTELUKAST SODIUM 10 MG/1
10 TABLET ORAL NIGHTLY
Qty: 30 TABLET | Refills: 2 | Status: SHIPPED | OUTPATIENT
Start: 2021-04-22 | End: 2021-08-01

## 2021-04-22 RX ORDER — PSEUDOEPHEDRINE HCL 120 MG/1
120 TABLET, FILM COATED, EXTENDED RELEASE ORAL EVERY 12 HOURS
Qty: 30 TABLET | Refills: 2 | Status: SHIPPED | OUTPATIENT
Start: 2021-04-22 | End: 2021-07-22 | Stop reason: SDUPTHER

## 2021-04-22 RX ORDER — DESVENLAFAXINE 25 MG/1
25 TABLET, EXTENDED RELEASE ORAL DAILY
Qty: 30 TABLET | Refills: 2 | Status: SHIPPED | OUTPATIENT
Start: 2021-04-22 | End: 2021-05-17

## 2021-04-22 RX ORDER — CRISABOROLE 20 MG/G
OINTMENT TOPICAL
Qty: 1 G | Refills: 0 | Status: SHIPPED | OUTPATIENT
Start: 2021-04-22

## 2021-04-22 ASSESSMENT — PATIENT HEALTH QUESTIONNAIRE - PHQ9
1. LITTLE INTEREST OR PLEASURE IN DOING THINGS: 0
SUM OF ALL RESPONSES TO PHQ QUESTIONS 1-9: 0
SUM OF ALL RESPONSES TO PHQ9 QUESTIONS 1 & 2: 0
2. FEELING DOWN, DEPRESSED OR HOPELESS: 0
SUM OF ALL RESPONSES TO PHQ QUESTIONS 1-9: 0
SUM OF ALL RESPONSES TO PHQ QUESTIONS 1-9: 0

## 2021-04-22 NOTE — PROGRESS NOTES
[facility] [address] [facility]  [address] [place] [address]  [id number]  [facility] [id number]  Dept: [phone]  Dept Fax: [phone]  Loc: [phone]        Nicole Gomez is a 31 y.o. female who presents today for her medical conditions/ complaints as noted below. Nicole Gomez is c/o Medication Refill (Needs refill on Protonix.) and Other (Pt states randomly from her knuckles to finger tips turn red and swell. States there is family hx of rheumatoid arthritis. States when it happens her hands get stiff and they feel hot)        Chief Complaint   Patient presents with    Medication Refill     Needs refill on Protonix.  Other     Pt states randomly from her knuckles to finger tips turn red and swell. States there is family hx of rheumatoid arthritis. States when it happens her hands get stiff and they feel hot       HPI:     HPI    Arthritis: She reports a family history of rheumatoid arthritis. Patient does not note arthritis to her hands, neck and back. Patient also reports that her fingers and feet will turn purplish yellow. He is unaware if this skin color change is associated with temperature change. Patient also request refills of her chronic medications. Patient reports that they have been compliant with taking medications as directed. Past Medical History:   Diagnosis Date    Anemia     history    Eczema        Past Surgical History:   Procedure Laterality Date    HUMERUS FRACTURE SURGERY  07-01-11    I&D of Compound wound, ORIF LEFT humerus fracture on  07-01-11.       TONSILLECTOMY         Social History     Socioeconomic History    Marital status: Single     Spouse name: None    Number of children: None    Years of education: None    Highest education level: None   Occupational History    None   Social Needs    Financial resource strain: None    Food insecurity     Worry: None     Inability: None    Transportation needs     Medical: None     Non-medical: None Tobacco Use    Smoking status: Never Smoker    Smokeless tobacco: Never Used   Substance and Sexual Activity    Alcohol use: No    Drug use: No    Sexual activity: None   Lifestyle    Physical activity     Days per week: None     Minutes per session: None    Stress: None   Relationships    Social connections     Talks on phone: None     Gets together: None     Attends Restorationist service: None     Active member of club or organization: None     Attends meetings of clubs or organizations: None     Relationship status: None    Intimate partner violence     Fear of current or ex partner: None     Emotionally abused: None     Physically abused: None     Forced sexual activity: None   Other Topics Concern    None   Social History Narrative    None       Family History   Problem Relation Age of Onset    Diabetes Maternal Grandfather     Stroke Maternal Grandfather        Current Outpatient Medications   Medication Sig Dispense Refill    pantoprazole (PROTONIX) 40 MG tablet TAKE 1 TABLET BY MOUTH EVERY DAY 30 tablet 0    desvenlafaxine succinate (PRISTIQ) 25 MG TB24 extended release tablet Take 1 tablet by mouth daily 30 tablet 2    pseudoephedrine (SUDAFED 12 HR) 120 MG extended release tablet Take 1 tablet by mouth every 12 hours 30 tablet 2    montelukast (SINGULAIR) 10 MG tablet Take 1 tablet by mouth nightly Patient will need appointment for further refills 30 tablet 2    Crisaborole (EUCRISA) 2 % OINT APPLY TO SENSITIVE AREA(S) AND FACIAL ECZEMA TWICE A DAY 1 g 0    fexofenadine (ALLEGRA) 180 MG tablet TAKE 1 TABLET BY MOUTH EVERY DAY 30 tablet 2    tiZANidine (ZANAFLEX) 4 MG tablet TAKE 1/2 TO 1 TABLET BY MOUTH AT BEDTIME (DO NOT DRIVE FOR 8 HRS AFTER TAKING) 30 tablet 2     Current Facility-Administered Medications   Medication Dose Route Frequency Provider Last Rate Last Admin    Dexamethasone Sodium Phosphate injection 4 mg  4 mg Intravenous Once LIBIA Arroyo           Allergies Allergen Reactions    Azithromycin Rash    Erythromycin Rash    Sulfa Antibiotics Rash       Lab Review not applicable  notapplicable    Subjective:   Review of Systems   Constitutional: Negative for activity change, appetite change, fatigue, fever and unexpected weight change. HENT: Negative for congestion, ear pain, nosebleeds, rhinorrhea, sore throat, trouble swallowing and voice change. Eyes: Negative for redness and visual disturbance. Respiratory: Negative for cough, chest tightness, shortness of breath and wheezing. Cardiovascular: Negative for chest pain, palpitations and leg swelling. Gastrointestinal: Negative for abdominal pain, blood in stool, constipation, diarrhea, nausea and vomiting. Endocrine: Negative for polydipsia, polyphagia and polyuria. Genitourinary: Negative for dysuria, frequency and urgency. Musculoskeletal: Positive for arthralgias. Negative for myalgias. Skin: Negative for rash and wound. Neurological: Negative for dizziness, speech difficulty, light-headedness and headaches. Psychiatric/Behavioral: Negative for agitation, confusion, self-injury and suicidal ideas. The patient is not nervous/anxious. Objective:     Physical Exam  Vitals signs and nursing note reviewed. Constitutional:       General: She is not in acute distress. Appearance: She is well-developed. She is not diaphoretic. HENT:      Head: Normocephalic and atraumatic. Right Ear: External ear normal.      Left Ear: External ear normal.      Nose: Nose normal.      Mouth/Throat:      Pharynx: No oropharyngeal exudate. Eyes:      General:         Right eye: No discharge. Left eye: No discharge. Conjunctiva/sclera: Conjunctivae normal.      Pupils: Pupils are equal, round, and reactive to light. Neck:      Musculoskeletal: Normal range of motion and neck supple. Cardiovascular:      Rate and Rhythm: Normal rate and regular rhythm.       Heart sounds: Normal heart sounds. No murmur. Pulmonary:      Effort: Pulmonary effort is normal. No respiratory distress. Breath sounds: Normal breath sounds. No stridor. No wheezing or rales. Chest:      Chest wall: No tenderness. Breasts:         Right: No inverted nipple, mass, nipple discharge, skin change or tenderness. Left: No inverted nipple, mass, nipple discharge, skin change or tenderness. Abdominal:      General: Bowel sounds are normal. There is no distension. Palpations: Abdomen is soft. Tenderness: There is no abdominal tenderness. Musculoskeletal: Normal range of motion. General: No tenderness or deformity. Skin:     General: Skin is warm and dry. Findings: No erythema or rash. Neurological:      Mental Status: She is alert and oriented to person, place, and time. Cranial Nerves: No cranial nerve deficit. Coordination: Coordination normal.      Deep Tendon Reflexes: Reflexes are normal and symmetric. Psychiatric:         Behavior: Behavior normal.         Thought Content: Thought content normal.       /68 (Site: Right Upper Arm, Position: Sitting)   Pulse 94   Temp 98.7 °F (37.1 °C) (Temporal)   Resp 18   Ht 5' 3\" (1.6 m)   Wt 136 lb (61.7 kg)   SpO2 97%   BMI 24.09 kg/m²     Assessment:      Diagnosis Orders   1. Gastroesophageal reflux disease without esophagitis  pantoprazole (PROTONIX) 40 MG tablet   2. Arthritis  DELMY Screen with Reflex    CBC Auto Differential    Comprehensive Metabolic Panel    Rheumatoid Factor    Sedimentation Rate    Uric Acid    Lipid Panel    TSH without Reflex    Urinalysis    T4, Free   3. Other problems related to lifestyle     4. Depression, unspecified depression type  desvenlafaxine succinate (PRISTIQ) 25 MG TB24 extended release tablet   5.  Allergic rhinitis due to other allergic trigger, unspecified seasonality  pseudoephedrine (SUDAFED 12 HR) 120 MG extended release tablet    montelukast (SINGULAIR) 10 MG tablet   6. Eczema of face  Crisaborole (EUCRISA) 2 % OINT     No results found for this visit on 04/22/21. Plan:     1. Gastroesophageal reflux disease without esophagitis    2. Arthritis    3. Other problems related to lifestyle    4. Depression, unspecified depression type    5. Allergic rhinitis due to other allergic trigger, unspecified seasonality    6. Eczema of face      Over 50% of the total visit time of 30 minutes was spent on counseling and or coordination of care of GERD, arthritis, depression, allergic rhinitis, and eczema of the face. We will move forward with testing at patient's request.  [place] [address] 10min, 90350 20min, 76618 30 min, 25845 45 min, 93795 60 [place]. [address] 5 min, 14364 10 min, 65397 15 min, 84371 25min, 13603 40min    Return in about 3 months (around 7/22/2021).   Orders Placed This Encounter   Procedures    DELMY Screen with Reflex     Standing Status:   Future     Number of Occurrences:   1     Standing Expiration Date:   4/22/2022    CBC Auto Differential     Standing Status:   Future     Number of Occurrences:   1     Standing Expiration Date:   4/22/2022    Comprehensive Metabolic Panel     Standing Status:   Future     Number of Occurrences:   1     Standing Expiration Date:   4/22/2022    Rheumatoid Factor     Standing Status:   Future     Number of Occurrences:   1     Standing Expiration Date:   4/22/2022    Sedimentation Rate     Standing Status:   Future     Number of Occurrences:   1     Standing Expiration Date:   4/22/2022    Uric Acid     Standing Status:   Future     Number of Occurrences:   1     Standing Expiration Date:   4/22/2022    Lipid Panel     Standing Status:   Future     Number of Occurrences:   1     Standing Expiration Date:   4/22/2022     Order Specific Question:   Is Patient Fasting?/# of Hours     Answer:   yes    TSH without Reflex     Standing Status:   Future     Number of Occurrences:   1 Standing Expiration Date:   4/22/2022    Urinalysis     Standing Status:   Future     Number of Occurrences:   1     Standing Expiration Date:   4/22/2022    T4, Free     Standing Status:   Future     Number of Occurrences:   1     Standing Expiration Date:   4/22/2022     Orders Placed This Encounter   Medications    pantoprazole (PROTONIX) 40 MG tablet     Sig: TAKE 1 TABLET BY MOUTH EVERY DAY     Dispense:  30 tablet     Refill:  0     No further refills until seen in office    desvenlafaxine succinate (PRISTIQ) 25 MG TB24 extended release tablet     Sig: Take 1 tablet by mouth daily     Dispense:  30 tablet     Refill:  2    pseudoephedrine (SUDAFED 12 HR) 120 MG extended release tablet     Sig: Take 1 tablet by mouth every 12 hours     Dispense:  30 tablet     Refill:  2    montelukast (SINGULAIR) 10 MG tablet     Sig: Take 1 tablet by mouth nightly Patient will need appointment for further refills     Dispense:  30 tablet     Refill:  2    Crisaborole (EUCRISA) 2 % OINT     Sig: APPLY TO SENSITIVE AREA(S) AND FACIAL ECZEMA TWICE A DAY     Dispense:  1 g     Refill:  0       There are no Patient Instructions on file for this visit.    /family given educational materials - see patient instructions. Discussed use, benefit, and side effects of prescribed medications. All patient/family questions answered and voiced understanding. Instructed to continue current medications, diet and exercise. Pt/family agreed with treatment plan. Follow up as directed and sooner if needed. Patient/ family instructed that is symptoms worsen or persist they are to contact office or report to nearest ER. They voice understanding and agreement with this plan.   signed by LIBIA Macdonald on 4/26/2021 at 1:18 PM CDT    This dictation was generated by voice recognition computer software.   Although all attempts are made to edit the dictation for accuracy, there may be errors in the transcription that are not intended.

## 2021-04-25 LAB — ANA IGG, ELISA: NORMAL

## 2021-04-26 PROBLEM — K21.9 GASTROESOPHAGEAL REFLUX DISEASE WITHOUT ESOPHAGITIS: Status: ACTIVE | Noted: 2021-04-26

## 2021-04-26 PROBLEM — F32.A DEPRESSION: Status: ACTIVE | Noted: 2021-04-26

## 2021-04-26 PROBLEM — M19.90 ARTHRITIS: Status: ACTIVE | Noted: 2021-04-26

## 2021-04-26 PROBLEM — Z72.89 OTHER PROBLEMS RELATED TO LIFESTYLE: Status: ACTIVE | Noted: 2021-04-26

## 2021-04-26 ASSESSMENT — ENCOUNTER SYMPTOMS
ABDOMINAL PAIN: 0
VOMITING: 0
BLOOD IN STOOL: 0
EYE REDNESS: 0
SHORTNESS OF BREATH: 0
WHEEZING: 0
CONSTIPATION: 0
COUGH: 0
SORE THROAT: 0
CHEST TIGHTNESS: 0
RHINORRHEA: 0
VOICE CHANGE: 0
NAUSEA: 0
DIARRHEA: 0
TROUBLE SWALLOWING: 0

## 2021-04-27 ENCOUNTER — TELEPHONE (OUTPATIENT)
Dept: PRIMARY CARE CLINIC | Age: 31
End: 2021-04-27

## 2021-04-27 NOTE — TELEPHONE ENCOUNTER
Called pt in regards to lab results per LIBIA Donato. I let pt know results were normal. Pt thanked me and ISHMAEL.

## 2021-05-03 DIAGNOSIS — K21.9 GASTROESOPHAGEAL REFLUX DISEASE WITHOUT ESOPHAGITIS: ICD-10-CM

## 2021-05-04 RX ORDER — PANTOPRAZOLE SODIUM 40 MG/1
TABLET, DELAYED RELEASE ORAL
Qty: 30 TABLET | Refills: 0 | Status: SHIPPED | OUTPATIENT
Start: 2021-05-04 | End: 2021-05-26 | Stop reason: SDUPTHER

## 2021-05-16 DIAGNOSIS — F32.A DEPRESSION, UNSPECIFIED DEPRESSION TYPE: ICD-10-CM

## 2021-05-17 RX ORDER — DESVENLAFAXINE 25 MG/1
TABLET, EXTENDED RELEASE ORAL
Qty: 30 TABLET | Refills: 2 | Status: SHIPPED | OUTPATIENT
Start: 2021-05-17 | End: 2021-08-18

## 2021-05-26 DIAGNOSIS — K21.9 GASTROESOPHAGEAL REFLUX DISEASE WITHOUT ESOPHAGITIS: ICD-10-CM

## 2021-05-26 RX ORDER — PANTOPRAZOLE SODIUM 40 MG/1
TABLET, DELAYED RELEASE ORAL
Qty: 30 TABLET | Refills: 0 | Status: SHIPPED | OUTPATIENT
Start: 2021-05-26 | End: 2021-06-26 | Stop reason: SDUPTHER

## 2021-06-18 ENCOUNTER — OFFICE VISIT (OUTPATIENT)
Dept: URGENT CARE | Age: 31
End: 2021-06-18
Payer: COMMERCIAL

## 2021-06-18 VITALS
OXYGEN SATURATION: 99 % | TEMPERATURE: 98.5 F | WEIGHT: 134 LBS | BODY MASS INDEX: 22.88 KG/M2 | HEART RATE: 102 BPM | DIASTOLIC BLOOD PRESSURE: 85 MMHG | HEIGHT: 64 IN | RESPIRATION RATE: 19 BRPM | SYSTOLIC BLOOD PRESSURE: 131 MMHG

## 2021-06-18 DIAGNOSIS — S90.861A INSECT BITE OF RIGHT FOOT, INITIAL ENCOUNTER: Primary | ICD-10-CM
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
## 2021-06-18 PROCEDURE — 96372 THER/PROPH/DIAG INJ SC/IM: CPT | Performed by: NURSE PRACTITIONER

## 2021-06-18 PROCEDURE — 99213 OFFICE O/P EST LOW 20 MIN: CPT | Performed by: NURSE PRACTITIONER

## 2021-06-18 RX ORDER — TRIAMCINOLONE ACETONIDE 1 MG/G
CREAM TOPICAL
Qty: 1 TUBE | Refills: 0 | Status: SHIPPED | OUTPATIENT
Start: 2021-06-18 | End: 2021-07-22 | Stop reason: ALTCHOICE

## 2021-06-18 RX ORDER — TRIAMCINOLONE ACETONIDE 40 MG/ML
40 INJECTION, SUSPENSION INTRA-ARTICULAR; INTRAMUSCULAR ONCE
Status: COMPLETED | OUTPATIENT
Start: 2021-06-18 | End: 2021-06-18

## 2021-06-18 RX ADMIN — TRIAMCINOLONE ACETONIDE 40 MG: 40 INJECTION, SUSPENSION INTRA-ARTICULAR; INTRAMUSCULAR at 15:28

## 2021-06-18 ASSESSMENT — ENCOUNTER SYMPTOMS: COLOR CHANGE: 1

## 2021-06-18 NOTE — PROGRESS NOTES
[address] [place] [address]   [address], [address]     Phone:  [phone]  Fax:  [phone]      Wero Mcelroy is a 31 y.o. female who presents today for her medical conditions/complaints as noted below. Wero Mcelroy is c/o of Foot Injury (R foot, got stung a few days ago, red and painful now)      Chief Complaint   Patient presents with    Foot Injury     R foot, got stung a few days ago, red and painful now       HPI:     HPI    Wero Mcelroy presents today for right foot pain, redness, and swelling. This started 2 days ago after a bug bite on her 3rd toe. Right third toe is now red, warm, and pruritic. She has used topical benadryl and this did not help. She has been afebrile. She is not having issues with walking. She has had similar reactions to insect bites/stings in the past. She has required steroid injections in the past.     Past Medical History:   Diagnosis Date    Anemia     history    Eczema         Past Surgical History:   Procedure Laterality Date    HUMERUS FRACTURE SURGERY  07-01-11    I&D of Compound wound, ORIF LEFT humerus fracture on  07-01-11.  TONSILLECTOMY         Social History     Tobacco Use    Smoking status: Never Smoker    Smokeless tobacco: Never Used   Substance Use Topics    Alcohol use: No        Current Outpatient Medications   Medication Sig Dispense Refill    triamcinolone (KENALOG) 0.1 % cream Apply topically 2 times daily. 1 Tube 0    mupirocin (BACTROBAN) 2 % ointment Apply 3 times daily.  1 Tube 0    pantoprazole (PROTONIX) 40 MG tablet Take 1 tablet by mouth daily 30 tablet 0    desvenlafaxine succinate (PRISTIQ) 25 MG TB24 extended release tablet TAKE 1 TABLET BY MOUTH ONCE DAILY 30 tablet 2    Crisaborole (EUCRISA) 2 % OINT APPLY TO SENSITIVE AREA(S) AND FACIAL ECZEMA TWICE A DAY 1 g 0    fexofenadine (ALLEGRA) 180 MG tablet TAKE 1 TABLET BY MOUTH EVERY DAY 30 tablet 2    tiZANidine (ZANAFLEX) 4 MG tablet TAKE 1/2 TO 1 TABLET BY MOUTH AT BEDTIME (DO NOT DRIVE FOR 8 HRS AFTER TAKING) 30 tablet 2    montelukast (SINGULAIR) 10 MG tablet Take 1 tablet by mouth nightly Patient will need appointment for further refills 30 tablet 2     Current Facility-Administered Medications   Medication Dose Route Frequency Provider Last Rate Last Admin    triamcinolone acetonide (KENALOG-40) injection 40 mg  40 mg Intramuscular Once [employer], APRN           Allergies   Allergen Reactions    Azithromycin Rash    Erythromycin Rash    Sulfa Antibiotics Rash       Family History   Problem Relation Age of Onset    Diabetes Maternal Grandfather     Stroke Maternal Grandfather                Review of Systems   Constitutional: Negative for fever. Musculoskeletal: Negative. Skin: Positive for color change and rash. Objective:     Physical Exam  Vitals and nursing note reviewed. Constitutional:       General: She is not in acute distress. Appearance: Normal appearance. She is well-developed. She is not ill-appearing, toxic-appearing or diaphoretic. HENT:      Head: Normocephalic and atraumatic. Right Ear: External ear normal.      Left Ear: External ear normal.      Nose: Nose normal.      Mouth/Throat:      Dentition: Normal dentition. Eyes:      General:         Right eye: No discharge. Left eye: No discharge. Conjunctiva/sclera: Conjunctivae normal.      Pupils: Pupils are equal, round, and reactive to light. Cardiovascular:      Rate and Rhythm: Regular rhythm. Tachycardia present. Pulses: Normal pulses. Pulmonary:      Effort: Pulmonary effort is normal. No respiratory distress. Breath sounds: Normal breath sounds. No stridor. No wheezing, rhonchi or rales. Musculoskeletal:         General: Normal range of motion. Cervical back: Normal range of motion and neck supple. Lumbar back: Normal range of motion. Right lower leg: No edema. Left lower leg: No edema. Feet:    Lymphadenopathy:      Cervical: No cervical adenopathy. Skin:     General: Skin is warm and dry. Capillary Refill: Capillary refill takes less than 2 seconds. Findings: Erythema, lesion and rash present. Neurological:      General: No focal deficit present. Mental Status: She is alert and oriented to person, place, and time. Mental status is at baseline. Psychiatric:         Mood and Affect: Mood normal.         Behavior: Behavior normal.         /85   Pulse 102   Temp 98.5 °F (36.9 °C) (Temporal)   Resp 19   Ht 5' 4\" (1.626 m)   Wt 134 lb (60.8 kg)   SpO2 99%   BMI 23.00 kg/m²     Assessment:      Diagnosis Orders   1. Insect bite of right foot, initial encounter  triamcinolone (KENALOG) 0.1 % cream    mupirocin (BACTROBAN) 2 % ointment    triamcinolone acetonide (KENALOG-40) injection 40 mg       No results found for this visit on 06/18/21. Plan:     Triamcinolone given in office without reaction. Use antibiotic ointment (bactroban) as prescribed    Can use benadryl or triamcinolone cream as needed for itching/pain    Use cool compresses and tylenol/ibuoprofen as needed for pain/swelling    Return if symptoms worsen or fail to improve. No orders of the defined types were placed in this encounter. Orders Placed This Encounter   Medications    triamcinolone (KENALOG) 0.1 % cream     Sig: Apply topically 2 times daily. Dispense:  1 Tube     Refill:  0    mupirocin (BACTROBAN) 2 % ointment     Sig: Apply 3 times daily. Dispense:  1 Tube     Refill:  0    triamcinolone acetonide (KENALOG-40) injection 40 mg        Patient offered educational materials - see patient instructions for any instruction needed. Discussed use, benefit, and side effects of prescribed medications. All patient questions answered. Instructed to continue current medications, diet and exercise. Patient agreed with treatment plan. Follow up as directed.  Patient was advised to go to the ED if condition ever becomes emergent.        Electronically signed by Fabiola Little on 6/18/2021 at 3:20 PM

## 2021-06-18 NOTE — PATIENT INSTRUCTIONS
Patient Education      You had a steroid injection in the office    Use antibiotic ointment (bactroban) as prescribed    Can use benadryl or triamcinolone cream as needed for itching/pain    Use cool compresses and tylenol/ibuoprofen as needed for pain/swelling    Return as needed  Insect Stings and Bites: Care Instructions  Your Care Instructions  Stings and bites from bees, wasps, ants, and other insects often cause pain, swelling, redness, and itching. In some people, especially children, the redness and swelling may be worse. It may extend several inches beyond the affected area. But in most cases, stings and bites don't cause reactions all over the body. If you have had a reaction to an insect sting or bite, you are at risk for a reaction if you get stung or bitten again. Follow-up care is a key part of your treatment and safety. Be sure to make and go to all appointments, and call your doctor if you are having problems. It's also a good idea to know your test results and keep a list of the medicines you take. How can you care for yourself at home? · Do not scratch or rub the skin where the sting or bite occurred. · Put a cold pack or ice cube on the area. Put a thin cloth between the ice and your skin. For some people, a paste of baking soda mixed with a little water helps relieve pain and decrease the reaction. · Take an over-the-counter antihistamine, such as diphenhydramine (Benadryl) or loratadine (Claritin), to relieve swelling, redness, and itching. Calamine lotion or hydrocortisone cream may also help. Do not give antihistamines to your child unless you have checked with the doctor first.  · Be safe with medicines. If your doctor prescribed medicine for your allergy, take it exactly as prescribed. Call your doctor if you think you are having a problem with your medicine. You will get more details on the specific medicines your doctor prescribes.   · Your doctor may prescribe a shot of epinephrine to carry with you in case you have a severe reaction. Learn how and when to give yourself the shot, and keep it with you at all times. Make sure it has not . · Go to the emergency room anytime you have a severe reaction. Go even if you have given yourself epinephrine and are feeling better. Symptoms can come back. When should you call for help? Call 911 anytime you think you may need emergency care. For example, call if:    · You have symptoms of a severe allergic reaction. These may include:  ? Sudden raised, red areas (hives) all over your body. ? Swelling of the throat, mouth, lips, or tongue. ? Trouble breathing. ? Passing out (losing consciousness). Or you may feel very lightheaded or suddenly feel weak, confused, or restless. Call your doctor now or seek immediate medical care if:    · You have symptoms of an allergic reaction not right at the sting or bite, such as:  ? A rash or small area of hives (raised, red areas on the skin). ? Itching. ? Swelling. ? Belly pain, nausea, or vomiting.     · You have a lot of swelling around the site (such as your entire arm or leg is swollen).     · You have signs of infection, such as:  ? Increased pain, swelling, redness, or warmth around the sting. ? Red streaks leading from the area. ? Pus draining from the sting. ? A fever. Watch closely for changes in your health, and be sure to contact your doctor if:    · You do not get better as expected. Where can you learn more? Go to [email or web address]://[email or web address].[email or web address] org and sign in to your Smart Education account. Enter P390 in the [employer]okbee box to learn more about \"Insect Stings and Bites: Care Instructions. \"     If you do not have an account, please click on the \"Sign Up Now\" link. Current as of: 2020               Content Version: 12.9  © 5209-5302 [employer], [employer]orporated. Care instructions adapted under license by [facility] ([facility]).  If you have questions about a medical condition or this instruction, always ask your healthcare professional. [place] [address] any warranty or liability for your use of this information.

## 2021-06-25 RX ORDER — TIZANIDINE 4 MG/1
TABLET ORAL
Qty: 90 TABLET | Refills: 1 | Status: SHIPPED | OUTPATIENT
Start: 2021-06-25 | End: 2021-12-14

## 2021-06-26 DIAGNOSIS — K21.9 GASTROESOPHAGEAL REFLUX DISEASE WITHOUT ESOPHAGITIS: ICD-10-CM

## 2021-06-27 RX ORDER — PANTOPRAZOLE SODIUM 40 MG/1
TABLET, DELAYED RELEASE ORAL
Qty: 30 TABLET | Refills: 0 | Status: SHIPPED | OUTPATIENT
Start: 2021-06-27 | End: 2021-07-22

## 2021-07-22 ENCOUNTER — OFFICE VISIT (OUTPATIENT)
Dept: PRIMARY CARE CLINIC | Age: 31
End: 2021-07-22
Payer: COMMERCIAL

## 2021-07-22 VITALS
WEIGHT: 134 LBS | SYSTOLIC BLOOD PRESSURE: 136 MMHG | HEART RATE: 94 BPM | TEMPERATURE: 98.3 F | HEIGHT: 64 IN | RESPIRATION RATE: 18 BRPM | BODY MASS INDEX: 22.88 KG/M2 | DIASTOLIC BLOOD PRESSURE: 86 MMHG | OXYGEN SATURATION: 98 %

## 2021-07-22 DIAGNOSIS — L30.9 ECZEMA OF FACE: ICD-10-CM

## 2021-07-22 DIAGNOSIS — K21.9 GASTROESOPHAGEAL REFLUX DISEASE WITHOUT ESOPHAGITIS: Primary | ICD-10-CM

## 2021-07-22 DIAGNOSIS — F32.A DEPRESSION, UNSPECIFIED DEPRESSION TYPE: ICD-10-CM

## 2021-07-22 DIAGNOSIS — J30.89 ALLERGIC RHINITIS DUE TO OTHER ALLERGIC TRIGGER, UNSPECIFIED SEASONALITY: ICD-10-CM

## 2021-07-22 DIAGNOSIS — M19.90 ARTHRITIS: ICD-10-CM

## 2021-07-22 DIAGNOSIS — K21.9 GASTROESOPHAGEAL REFLUX DISEASE WITHOUT ESOPHAGITIS: ICD-10-CM

## 2021-07-22 PROCEDURE — 99214 OFFICE O/P EST MOD 30 MIN: CPT | Performed by: NURSE PRACTITIONER

## 2021-07-22 RX ORDER — DESLORATADINE 5 MG/1
5 TABLET ORAL NIGHTLY
Qty: 30 TABLET | Refills: 2 | Status: SHIPPED | OUTPATIENT
Start: 2021-07-22 | End: 2021-08-21

## 2021-07-22 RX ORDER — PANTOPRAZOLE SODIUM 40 MG/1
TABLET, DELAYED RELEASE ORAL
Qty: 30 TABLET | Refills: 0 | Status: SHIPPED | OUTPATIENT
Start: 2021-07-22 | End: 2021-08-15

## 2021-07-22 RX ORDER — PSEUDOEPHEDRINE HCL 120 MG/1
120 TABLET, FILM COATED, EXTENDED RELEASE ORAL EVERY 12 HOURS
Qty: 30 TABLET | Refills: 2 | Status: SHIPPED | OUTPATIENT
Start: 2021-07-22 | End: 2021-10-20

## 2021-07-22 ASSESSMENT — ENCOUNTER SYMPTOMS
CONSTIPATION: 0
BLOOD IN STOOL: 0
VOMITING: 0
EYE REDNESS: 0
DIARRHEA: 0
VOICE CHANGE: 0
SHORTNESS OF BREATH: 0
WHEEZING: 0
SORE THROAT: 0
TROUBLE SWALLOWING: 0
NAUSEA: 0
COUGH: 0
ABDOMINAL PAIN: 0
RHINORRHEA: 0
CHEST TIGHTNESS: 0

## 2021-07-22 ASSESSMENT — PATIENT HEALTH QUESTIONNAIRE - PHQ9
SUM OF ALL RESPONSES TO PHQ9 QUESTIONS 1 & 2: 0
2. FEELING DOWN, DEPRESSED OR HOPELESS: 0
SUM OF ALL RESPONSES TO PHQ QUESTIONS 1-9: 0
1. LITTLE INTEREST OR PLEASURE IN DOING THINGS: 0

## 2021-07-22 NOTE — PROGRESS NOTES
[address] [facility] CARE  [facility]  [id number]  [address] [id number]  Dept: [phone]  Dept Fax: [phone]  Loc: [phone]        Clinton Owens is a 31 y.o. female who presents today for her medical conditions/ complaints as noted below. Clinton Owens is c/o 3 Month Follow-Up (Pt states she has no concerns )        Chief Complaint   Patient presents with    3 Month Follow-Up     Pt states she has no concerns        HPI:     HPI    Arthritis/ vasoconstriction of fingers   7/22/2021: notes that when she gets cold her fingers do turn purple, and then return back to normal. Denies any significant issues. 4/22/2021: She reports a family history of rheumatoid arthritis. Patient does not note arthritis to her hands, neck and back. Patient also reports that her fingers and feet will turn purplish yellow. He is unaware if this skin color change is associated with temperature change. Patient also request refills of her chronic medications. Patient reports that they have been compliant with taking medications as directed. Past Medical History:   Diagnosis Date    Anemia     history    Eczema        Past Surgical History:   Procedure Laterality Date    HUMERUS FRACTURE SURGERY  07-01-11    I&D of Compound wound, ORIF LEFT humerus fracture on  07-01-11.       TONSILLECTOMY         Social History     Socioeconomic History    Marital status: Single     Spouse name: None    Number of children: None    Years of education: None    Highest education level: None   Occupational History    None   Tobacco Use    Smoking status: Never Smoker    Smokeless tobacco: Never Used   Vaping Use    Vaping Use: Never used   Substance and Sexual Activity    Alcohol use: No    Drug use: No    Sexual activity: None   Other Topics Concern    None   Social History Narrative    None     Social Determinants of Health     Financial Resource Strain:     Difficulty of Paying Living Expenses:    Food Insecurity:     Worried About Running Out of Food in the Last Year:     [address] in the Last Year:    Transportation Needs:     Lack of Transportation (Medical):  Lack of Transportation (Non-Medical):    Physical Activity:     Days of Exercise per Week:     Minutes of Exercise per Session:    Stress:     Feeling of Stress :    Social Connections:     Frequency of Communication with Friends and Family:     Frequency of Social Gatherings with Friends and Family:     Attends [employer] Services:     Active Member of Clubs or Organizations:     Attends Club or Organization Meetings:     Marital Status:    Intimate Partner Violence:     Fear of Current or Ex-Partner:     Emotionally Abused:     Physically Abused:     Sexually Abused:        Family History   Problem Relation Age of Onset    Diabetes Maternal Grandfather     Stroke Maternal Grandfather        Current Outpatient Medications   Medication Sig Dispense Refill    pseudoephedrine (SUDAFED 12 HR) 120 MG extended release tablet Take 1 tablet by mouth every 12 hours 30 tablet 2    desloratadine (CLARINEX) 5 MG tablet Take 1 tablet by mouth nightly 30 tablet 2    tiZANidine (ZANAFLEX) 4 MG tablet TAKE 1/2 TO 1 TABLET BY MOUTH AT BEDTIME (DO NOT DRIVE FOR 8 HRS AFTER TAKING) 90 tablet 1    desvenlafaxine succinate (PRISTIQ) 25 MG TB24 extended release tablet TAKE 1 TABLET BY MOUTH ONCE DAILY 30 tablet 2    montelukast (SINGULAIR) 10 MG tablet Take 1 tablet by mouth nightly Patient will need appointment for further refills 30 tablet 2    Crisaborole (EUCRISA) 2 % OINT APPLY TO SENSITIVE AREA(S) AND FACIAL ECZEMA TWICE A DAY 1 g 0    fexofenadine (ALLEGRA) 180 MG tablet TAKE 1 TABLET BY MOUTH EVERY DAY 30 tablet 2    pantoprazole (PROTONIX) 40 MG tablet TAKE 1 TABLET BY MOUTH EVERY DAY 30 tablet 0     No current facility-administered medications for this visit.        Allergies   Allergen Reactions    Azithromycin Rash    Erythromycin Rash    Sulfa Antibiotics Rash       Lab Review not applicable  notapplicable    Subjective:   Review of Systems   Constitutional: Negative for activity change, appetite change, fatigue, fever and unexpected weight change. HENT: Positive for postnasal drip. Negative for congestion, ear pain, nosebleeds, rhinorrhea, sore throat, trouble swallowing and voice change. Eyes: Negative for redness and visual disturbance. Respiratory: Negative for cough, chest tightness, shortness of breath and wheezing. Cardiovascular: Negative for chest pain, palpitations and leg swelling. Gastrointestinal: Negative for abdominal pain, blood in stool, constipation, diarrhea, nausea and vomiting. Endocrine: Negative for polydipsia, polyphagia and polyuria. Genitourinary: Negative for dysuria, frequency and urgency. Musculoskeletal: Positive for arthralgias. Negative for myalgias. Skin: Negative for rash and wound. Neurological: Negative for dizziness, speech difficulty, light-headedness and headaches. Psychiatric/Behavioral: Negative for agitation, confusion, self-injury and suicidal ideas. The patient is not nervous/anxious. Objective:     Physical Exam  Vitals and nursing note reviewed. Constitutional:       General: She is not in acute distress. Appearance: She is well-developed. She is not diaphoretic. HENT:      Head: Normocephalic and atraumatic. Right Ear: External ear normal.      Left Ear: External ear normal.      Nose: Nose normal.      Mouth/Throat:      Pharynx: No oropharyngeal exudate. Eyes:      General:         Right eye: No discharge. Left eye: No discharge. Conjunctiva/sclera: Conjunctivae normal.      Pupils: Pupils are equal, round, and reactive to light. Cardiovascular:      Rate and Rhythm: Normal rate and regular rhythm. Heart sounds: Normal heart sounds. No murmur heard.      Pulmonary:      Effort: Pulmonary effort is normal. was spent on counseling and or coordination of care of GERD, arthritis, depression, allergic rhinitis, and eczema of the face. We will move forward with testing at patient's request.  [place] [address] 10min, 69597 20min, 33443 30 min, 83063 45 min, 48384 60 [place]. [address] 5 min, 34799 10 min, 55987 15 min, 27895 25min, 28667 40min    Return in about 6 months (around 1/22/2022) for 30 min appointment. No orders of the defined types were placed in this encounter. Orders Placed This Encounter   Medications    pseudoephedrine (SUDAFED 12 HR) 120 MG extended release tablet     Sig: Take 1 tablet by mouth every 12 hours     Dispense:  30 tablet     Refill:  2    desloratadine (CLARINEX) 5 MG tablet     Sig: Take 1 tablet by mouth nightly     Dispense:  30 tablet     Refill:  2       Patient Instructions   Begin clarinex 5mg at bedtime. Stop the allegra for now.       Elaine Valdes given educational materials - see patient instructions. Discussed use, benefit, and side effects of prescribed medications. All patient/family questions answered and voiced understanding. Instructed to continue current medications, diet and exercise. Pt/family agreed with treatment plan. Follow up as directed and sooner if needed. Patient/ family instructed that is symptoms worsen or persist they are to contact office or report to nearest ER. They voice understanding and agreement with this plan.   signed by LIBIA Frazier on 7/23/2021 at 1:18 PM CDT    This dictation was generated by voice recognition computer software. Although all attempts are made to edit the dictation for accuracy, there may be errors in the transcription that are not intended.

## 2021-08-01 DIAGNOSIS — J30.89 ALLERGIC RHINITIS DUE TO OTHER ALLERGIC TRIGGER, UNSPECIFIED SEASONALITY: ICD-10-CM

## 2021-08-01 RX ORDER — MONTELUKAST SODIUM 10 MG/1
10 TABLET ORAL NIGHTLY
Qty: 90 TABLET | Refills: 3 | Status: SHIPPED | OUTPATIENT
Start: 2021-08-01 | End: 2022-08-10

## 2021-08-18 DIAGNOSIS — F32.A DEPRESSION, UNSPECIFIED DEPRESSION TYPE: ICD-10-CM

## 2021-08-18 RX ORDER — DESVENLAFAXINE 25 MG/1
TABLET, EXTENDED RELEASE ORAL
Qty: 30 TABLET | Refills: 2 | Status: SHIPPED | OUTPATIENT
Start: 2021-08-18 | End: 2021-11-16

## 2021-09-07 ENCOUNTER — OFFICE VISIT (OUTPATIENT)
Dept: PRIMARY CARE CLINIC | Age: 31
End: 2021-09-07
Payer: COMMERCIAL

## 2021-09-07 VITALS
OXYGEN SATURATION: 95 % | BODY MASS INDEX: 23.39 KG/M2 | RESPIRATION RATE: 18 BRPM | TEMPERATURE: 97.2 F | HEIGHT: 64 IN | WEIGHT: 137 LBS | HEART RATE: 109 BPM | DIASTOLIC BLOOD PRESSURE: 84 MMHG | SYSTOLIC BLOOD PRESSURE: 130 MMHG

## 2021-09-07 DIAGNOSIS — N92.1 MENORRHAGIA WITH IRREGULAR CYCLE: Primary | ICD-10-CM

## 2021-09-07 DIAGNOSIS — T30.0 BURN: ICD-10-CM

## 2021-09-07 DIAGNOSIS — Z23 NEED FOR DTAP VACCINATION: ICD-10-CM

## 2021-09-07 DIAGNOSIS — Z23 NEED FOR TDAP VACCINATION: ICD-10-CM

## 2021-09-07 DIAGNOSIS — E04.1 THYROID NODULE: ICD-10-CM

## 2021-09-07 DIAGNOSIS — Z30.09 BIRTH CONTROL COUNSELING: ICD-10-CM

## 2021-09-07 PROCEDURE — 99214 OFFICE O/P EST MOD 30 MIN: CPT | Performed by: NURSE PRACTITIONER

## 2021-09-07 ASSESSMENT — PATIENT HEALTH QUESTIONNAIRE - PHQ9
SUM OF ALL RESPONSES TO PHQ QUESTIONS 1-9: 0
SUM OF ALL RESPONSES TO PHQ9 QUESTIONS 1 & 2: 0
SUM OF ALL RESPONSES TO PHQ QUESTIONS 1-9: 0
2. FEELING DOWN, DEPRESSED OR HOPELESS: 0
SUM OF ALL RESPONSES TO PHQ QUESTIONS 1-9: 0
1. LITTLE INTEREST OR PLEASURE IN DOING THINGS: 0

## 2021-09-07 ASSESSMENT — ENCOUNTER SYMPTOMS
SHORTNESS OF BREATH: 0
VOMITING: 0
WHEEZING: 0
CONSTIPATION: 0
DIARRHEA: 0
EYE REDNESS: 0
VOICE CHANGE: 0
NAUSEA: 0
TROUBLE SWALLOWING: 0
CHEST TIGHTNESS: 0
ABDOMINAL PAIN: 0
COUGH: 0
SORE THROAT: 0
BLOOD IN STOOL: 0
RHINORRHEA: 0

## 2021-09-07 NOTE — PROGRESS NOTES
[address] [facility] CARE  Formerly [facility]  [id number]  [phone] [id number]  Dept: [phone]  Dept Fax: [phone]  Loc: [phone]        Anaya Lucero is a 31 y.o. female who presents today for her medical conditions/ complaints as noted below. Anaya Lucero is c/o Other (Abnormal menstrual cycles. Pt states it has been happening for the past 3 months. Pt states she starts bleeding every other week.)        Chief Complaint   Patient presents with    Other     Abnormal menstrual cycles. Pt states it has been happening for the past 3 months. Pt states she starts bleeding every other week. HPI:     Other  Associated symptoms include arthralgias. Pertinent negatives include no abdominal pain, chest pain, congestion, coughing, fatigue, fever, headaches, myalgias, nausea, rash, sore throat or vomiting. Abnormal menstrual cycle:   Pt reports every other week having a period. Notes that her period will last 4 days to one week. States that she has been using 3-4 tampons in a 24 hour day when she is on her period. Also complains of fatigue and abdominal cramping with menses. Thyroid enlargement: pt notes that she has had some thyroid enlargement. States that     Patient also request refills of her chronic medications. Patient reports that they have been compliant with taking medications as directed. Past Medical History:   Diagnosis Date    Anemia     history    Eczema        Past Surgical History:   Procedure Laterality Date    HUMERUS FRACTURE SURGERY  07-01-11    I&D of Compound wound, ORIF LEFT humerus fracture on  07-01-11.       TONSILLECTOMY         Social History     Socioeconomic History    Marital status: Single     Spouse name: None    Number of children: None    Years of education: None    Highest education level: None   Occupational History    None   Tobacco Use    Smoking status: Never Smoker    Smokeless tobacco: Never Used   Vaping Use    Vaping Use: Never used   Substance and Sexual Activity    Alcohol use: No    Drug use: No    Sexual activity: None   Other Topics Concern    None   Social History Narrative    None     Social Determinants of Health     Financial Resource Strain:     Difficulty of Paying Living Expenses:    Food Insecurity:     Worried About Running Out of Food in the Last Year:     [address] in the Last Year:    Transportation Needs:     Lack of Transportation (Medical):      Lack of Transportation (Non-Medical):    Physical Activity:     Days of Exercise per Week:     Minutes of Exercise per Session:    Stress:     Feeling of Stress :    Social Connections:     Frequency of Communication with Friends and Family:     Frequency of Social Gatherings with Friends and Family:     Attends Judaism Services:     Active Member of Clubs or Organizations:     Attends Club or Organization Meetings:     Marital Status:    Intimate Partner Violence:     Fear of Current or Ex-Partner:     Emotionally Abused:     Physically Abused:     Sexually Abused:        Family History   Problem Relation Age of Onset    Diabetes Maternal Grandfather     Stroke Maternal Grandfather        Current Outpatient Medications   Medication Sig Dispense Refill    norgestimate-ethinyl estradiol (ORTHO TRI-CYCLEN LO) 0.025 MG tablet Take 1 tablet by mouth daily 30 tablet 11    desvenlafaxine succinate (PRISTIQ) 25 MG TB24 extended release tablet TAKE 1 TABLET BY MOUTH EVERY DAY 30 tablet 2    pantoprazole (PROTONIX) 40 MG tablet TAKE 1 TABLET BY MOUTH EVERY DAY 30 tablet 5    montelukast (SINGULAIR) 10 MG tablet TAKE 1 TABLET BY MOUTH NIGHTLY PATIENT WILL NEED APPOINTMENT FOR FURTHER REFILLS 90 tablet 3    tiZANidine (ZANAFLEX) 4 MG tablet TAKE 1/2 TO 1 TABLET BY MOUTH AT BEDTIME (DO NOT DRIVE FOR 8 HRS AFTER TAKING) 90 tablet 1    Crisaborole (EUCRISA) 2 % OINT APPLY TO SENSITIVE AREA(S) AND FACIAL ECZEMA TWICE A DAY 1 g 0    fexofenadine (ALLEGRA) 180 MG tablet TAKE 1 TABLET BY MOUTH EVERY DAY 30 tablet 2     No current facility-administered medications for this visit. Allergies   Allergen Reactions    Azithromycin Rash    Erythromycin Rash    Sulfa Antibiotics Rash       Lab Review not applicable  notapplicable    Subjective:   Review of Systems   Constitutional: Negative for activity change, appetite change, fatigue, fever and unexpected weight change. HENT: Positive for postnasal drip. Negative for congestion, ear pain, nosebleeds, rhinorrhea, sore throat, trouble swallowing and voice change. Eyes: Negative for redness and visual disturbance. Respiratory: Negative for cough, chest tightness, shortness of breath and wheezing. Cardiovascular: Negative for chest pain, palpitations and leg swelling. Gastrointestinal: Negative for abdominal pain, blood in stool, constipation, diarrhea, nausea and vomiting. Endocrine: Negative for polydipsia, polyphagia and polyuria. Genitourinary: Negative for dysuria, frequency and urgency. Musculoskeletal: Positive for arthralgias. Negative for myalgias. Skin: Negative for rash and wound. Neurological: Negative for dizziness, speech difficulty, light-headedness and headaches. Psychiatric/Behavioral: Negative for agitation, confusion, self-injury and suicidal ideas. The patient is not nervous/anxious. Objective:     Physical Exam  Vitals and nursing note reviewed. Constitutional:       General: She is not in acute distress. Appearance: She is well-developed. She is not diaphoretic. HENT:      Head: Normocephalic and atraumatic. Right Ear: External ear normal.      Left Ear: External ear normal.      Nose: Nose normal.      Mouth/Throat:      Pharynx: No oropharyngeal exudate. Eyes:      General:         Right eye: No discharge. Left eye: No discharge.       Conjunctiva/sclera: Conjunctivae normal.      Pupils: Pupils are equal, round, and reactive to light. Cardiovascular:      Rate and Rhythm: Normal rate and regular rhythm. Heart sounds: Normal heart sounds. No murmur heard. Pulmonary:      Effort: Pulmonary effort is normal. No respiratory distress. Breath sounds: Normal breath sounds. No stridor. No wheezing or rales. Chest:      Chest wall: No tenderness. Breasts:         Right: No inverted nipple, mass, nipple discharge, skin change or tenderness. Left: No inverted nipple, mass, nipple discharge, skin change or tenderness. Abdominal:      General: Bowel sounds are normal. There is no distension. Palpations: Abdomen is soft. Tenderness: There is no abdominal tenderness. Musculoskeletal:         General: No tenderness or deformity. Normal range of motion. Cervical back: Normal range of motion and neck supple. Skin:     General: Skin is warm and dry. Findings: No erythema or rash. Neurological:      Mental Status: She is alert and oriented to person, place, and time. Cranial Nerves: No cranial nerve deficit. Coordination: Coordination normal.      Deep Tendon Reflexes: Reflexes are normal and symmetric. Psychiatric:         Behavior: Behavior normal.         Thought Content: Thought content normal.       /84 (Site: Left Upper Arm, Position: Sitting)   Pulse 109   Temp 97.2 °F (36.2 °C) (Temporal)   Resp 18   Ht 5' 4\" (1.626 m)   Wt 137 lb (62.1 kg)   SpO2 95%   BMI 23.52 kg/m²     Assessment:      Diagnosis Orders   1. Menorrhagia with irregular cycle     2. Birth control counseling  norgestimate-ethinyl estradiol (ORTHO TRI-CYCLEN LO) 0.025 MG tablet   3. Burn  Tdap (age 6y and older) IM ([address])   4. Thyroid nodule  US THYROID   5. Need for Tdap vaccination       No results found for this visit on 09/07/21. Plan:     1. Menorrhagia with irregular cycle    2. Birth control counseling    3. Burn    4. Thyroid nodule    5.  Need for Tdap vaccination Over 50% of the total visit time of 30 minutes was spent on counseling and or coordination of care of menorrhagia with irregular cycle, birth control counseling, burn, thyroid nodule, need for tdap. We will move forward with testing at patient's request.  All medications have been refilled. Return in about 3 months (around 12/7/2021). Orders Placed This Encounter   Procedures    US THYROID     Standing Status:   Future     Standing Expiration Date:   9/7/2022     Order Specific Question:   Reason for exam:     Answer:   Thyroid nodule/ enlarged thyroid    Tdap (age 6y and older) IM ([address])     Orders Placed This Encounter   Medications    norgestimate-ethinyl estradiol (ORTHO TRI-CYCLEN LO) 0.025 MG tablet     Sig: Take 1 tablet by mouth daily     Dispense:  30 tablet     Refill:  11       Patient Instructions   Begin ortho tricyclic low at bedtime.           Alta Rowe given educational materials - see patient instructions. Discussed use, benefit, and side effects of prescribed medications. All patient/family questions answered and voiced understanding. Instructed to continue current medications, diet and exercise. Pt/family agreed with treatment plan. Follow up as directed and sooner if needed. Patient/ family instructed that is symptoms worsen or persist they are to contact office or report to nearest ER. They voice understanding and agreement with this plan.   signed by LIBIA Montana on 9/10/2021 at 1:18 PM CDT    This dictation was generated by voice recognition computer software. Although all attempts are made to edit the dictation for accuracy, there may be errors in the transcription that are not intended.

## 2021-09-10 PROBLEM — T30.0 BURN: Status: ACTIVE | Noted: 2021-09-10

## 2021-09-10 PROBLEM — Z30.09 BIRTH CONTROL COUNSELING: Status: ACTIVE | Noted: 2021-09-10

## 2021-09-10 PROBLEM — N92.1 MENORRHAGIA WITH IRREGULAR CYCLE: Status: ACTIVE | Noted: 2021-09-10

## 2021-09-10 PROBLEM — Z23 NEED FOR TDAP VACCINATION: Status: ACTIVE | Noted: 2021-09-10

## 2021-09-10 PROBLEM — E04.1 THYROID NODULE: Status: ACTIVE | Noted: 2021-09-10

## 2021-09-13 ENCOUNTER — HOSPITAL ENCOUNTER (OUTPATIENT)
Dept: ULTRASOUND IMAGING | Age: 31
Discharge: HOME | End: 2021-09-13
Payer: COMMERCIAL

## 2021-09-13 DIAGNOSIS — E04.1 THYROID NODULE: ICD-10-CM

## 2021-09-13 PROCEDURE — 76536 US EXAM OF HEAD AND NECK: CPT

## 2021-09-17 PROCEDURE — 90471 IMMUNIZATION ADMIN: CPT | Performed by: NURSE PRACTITIONER

## 2021-09-17 PROCEDURE — 90715 TDAP VACCINE 7 YRS/> IM: CPT | Performed by: NURSE PRACTITIONER

## 2021-09-17 NOTE — PROGRESS NOTES
I had to document the incorrect vaccination for it to be documented. The consent form is also scanned in and has a note on it stating there were incorrect boxes in the crate and that's why she received a vaccination out of her age range.

## 2021-10-01 ENCOUNTER — TELEPHONE (OUTPATIENT)
Dept: PRIMARY CARE CLINIC | Age: 31
End: 2021-10-01

## 2021-10-01 DIAGNOSIS — R93.89 ABNORMAL THYROID ULTRASOUND: Primary | ICD-10-CM

## 2021-10-01 NOTE — TELEPHONE ENCOUNTER
Called pt in regards to [address] results per Kedar Ayoub, LIBIA. I let pt know she is being referred to ENT for thyroid biopsy. I let pt know I would place the referral and they will contact her to schedule an appt. Pt thanked me and ISHMAEL.

## 2021-10-01 NOTE — TELEPHONE ENCOUNTER
----- Message from LIBIA Motta sent at 9/28/2021  3:26 PM CDT -----  Results are abnormal. Refer to ENT pt needs biopsy of the thyroid nodule.

## 2021-10-20 DIAGNOSIS — J30.89 ALLERGIC RHINITIS DUE TO OTHER ALLERGIC TRIGGER, UNSPECIFIED SEASONALITY: ICD-10-CM

## 2021-10-20 RX ORDER — PSEUDOEPHEDRINE HCL 120 MG
TABLET, EXTENDED RELEASE ORAL
Qty: 30 TABLET | Refills: 2 | Status: SHIPPED | OUTPATIENT
Start: 2021-10-20 | End: 2022-01-14

## 2021-10-20 NOTE — TELEPHONE ENCOUNTER
Sean Batista called to request a refill on her medication.       Last office visit : 9/7/2021   Next office visit : 12/7/2021     Requested Prescriptions     Pending Prescriptions Disp Refills    [facility] 12 HOUR NASAL DECONGESTANT 120 MG extended release tablet [Pharmacy Med Name: [facility] 12HR DECONGEST 120 MG CPLT] 30 tablet 2     Sig: TAKE 1 TABLET BY MOUTH EVERY [address] [place], [place]

## 2021-10-25 ENCOUNTER — OFFICE VISIT (OUTPATIENT)
Dept: ENT CLINIC | Age: 31
End: 2021-10-25
Payer: COMMERCIAL

## 2021-10-25 VITALS — TEMPERATURE: 97.3 F | WEIGHT: 137 LBS | HEIGHT: 64 IN | BODY MASS INDEX: 23.39 KG/M2

## 2021-10-25 DIAGNOSIS — E04.1 THYROID NODULE: ICD-10-CM

## 2021-10-25 PROCEDURE — 99203 OFFICE O/P NEW LOW 30 MIN: CPT | Performed by: OTOLARYNGOLOGY

## 2021-10-25 PROCEDURE — 31575 DIAGNOSTIC LARYNGOSCOPY: CPT | Performed by: OTOLARYNGOLOGY

## 2021-10-25 NOTE — PROGRESS NOTES
31 y.o.  female presents today with a right thyroid nodule. This is been present for several years. She was actually seen and evaluated by ENT physicians at [facility] back in 2016. Needle biopsy was done at that time and was found to be benign. Recently she was complaining of anterior neck pain and discomfort. An ultrasound was done which showed the right thyroid nodule. She is sent to ENT for further evaluation. She denies any vocal changes. She is otherwise in her usual state of good health    Family History   Problem Relation Age of Onset    Diabetes Maternal Grandfather     Stroke Maternal Grandfather      Social History     Socioeconomic History    Marital status: Single     Spouse name: None    Number of children: None    Years of education: None    Highest education level: None   Occupational History    None   Tobacco Use    Smoking status: Never Smoker    Smokeless tobacco: Never Used   Vaping Use    Vaping Use: Never used   Substance and Sexual Activity    Alcohol use: No    Drug use: No    Sexual activity: None   Other Topics Concern    None   Social History Narrative    None     Social Determinants of Health     Financial Resource Strain:     Difficulty of Paying Living Expenses:    Food Insecurity:     Worried About Running Out of Food in the Last Year:     Ran Out of Food in the Last Year:    Transportation Needs:     Lack of Transportation (Medical):      Lack of Transportation (Non-Medical):    Physical Activity:     Days of Exercise per Week:     Minutes of Exercise per Session:    Stress:     Feeling of Stress :    Social Connections:     Frequency of Communication with Friends and Family:     Frequency of Social Gatherings with Friends and Family:     Attends Religion Services:     Active Member of Clubs or Organizations:     Attends Club or Organization Meetings:     Marital Status:    Intimate Partner Violence:     Fear of Current or Ex-Partner:     Emotionally Abused:     Physically Abused:     Sexually Abused:      Past Medical History:   Diagnosis Date    Anemia     history    Eczema      Past Surgical History:   Procedure Laterality Date    HUMERUS FRACTURE SURGERY  07-01-11    I&D of Compound wound, ORIF LEFT humerus fracture on  07-01-11.  TONSILLECTOMY           REVIEW OF SYSTEMS:  all other systems reviewed and are negative  General Health: no change in health status since last visit and see HPI / problem list  Throat: pain: No, hoarse: No, vocal difficulties: No, difficulty swallowing: No and painful swallowing: No  Neck: thyroid nodule: Yes and History of right thyroid nodule and neck pain: Yes and Mild anterior neck discomfort       Comments:     PHYSICAL EXAM:    Temp 97.3 °F (36.3 °C)   Ht 5' 4\" (1.626 m)   Wt 137 lb (62.1 kg)   BMI 23.52 kg/m²   Body mass index is 23.52 kg/m². General Appearance: well developed , well nourished and no distress  Head/ Face: normocephalic and atraumatic  Vocal Quality: good/ normal  Ears: Right Ear: External: external ears normal Otoscopy Ear Canal: canal clear Otoscopy TM: TM's normal Left Ear: External: external ears normal Otoscopy Ear Canal: canal clear Otoscopy TM: TM's normal  Hearing: grossly intact  Oral:lips: normal Oropharynx:normal tongue: normal   Dentition: good dentition   Tonsils: right 1+ and left 1+  Neck: adenopathy none palpable  Thyroid: no palpable nodles  Larynx: see endoscopy report  Hypopharynx: see endoscopy report  Neuro: alert and oriented x3 and cranial nerves II- XII grossly intact  Psych/ Mood: cooperative and no depression, anxiety or agitation    Assessment & Plan:    Problem List Items Addressed This Visit     Thyroid nodule      Impression    Left thyroid nodule measuring up to 1.7 cm in size for which FNA is   recommended.    This report was finalized on 10/26/2016 11:31 by Dr. Deon Huber MD.  Narrative    EXAMINATION: US THYROID- 10/26/2016 8:55 CST     HISTORY: Enlarged lymph node     COMPARISON: NONE. TECHNIQUE: Multiple longitudinal and transverse real-time sonographic   images of the thyroid are obtained. FINDINGS:   The right lobe of the thyroid measures 4.4 x 1.0 x 1.3  cm. The left   lobe of the thyroid measures 4.7 x 1.2 x 1.5 cm. The thyroid isthmus   measures 0.1 cm in thickness. The thyroid is homogeneous in echogenicity. A mixed echogenicity lesion   is seen within the left lobe of the thyroid gland which measures 1.7 x   0.8 x 1.3 cm. There is marked internal color flow of this lesion. Additionally, small calcifications are suspected within the lesion. Color Doppler demonstrates appropriate flow within the thyroid lobes. Final Diagnosis Left thyroid lobe, fine-needle aspirate, smears, ThinPrep, and cell block:  [place] Category II: BENIGN. Consistent with a benign follicular nodule (includes adenomatoid nodule, colloid nodule, etc)      Thyroid nodule previously biopsied and found to be benign. No appreciable change in size since initial biopsy. Although certainly case could be made for not repeating FNA, her age places her at higher risk and I feel that there is an added measure of safety and repeating the study. Relevant Orders    US BIOPSY THYROID    [id number] - [facility] LARYNGOSCOPY,FLEX FIBER,DIAGNOSTIC (Completed)          Orders Placed This Encounter   Procedures    US BIOPSY THYROID     Standing Status:   Future     Standing Expiration Date:   11/25/2021     Order Specific Question:   Reason for exam:     Answer:   Right thyroid nodule. Unchanged in size from prior ultrasound. Previous biopsy benign.  62074 - [facility] LARYNGOSCOPY,FLEX FIBER,DIAGNOSTIC     After application of topical anesthetic/decongestant, the flexible fiberoptic scope was used to evaluate the larynx. The scope passed easily and the larynx was well visualized. Posterior airway space was widely patent. There was no pooling secretions in the piriform sinuses. The larynx itself was normal in appearance with no mucosal changes or lesions. Both vocal cords were freely mobile with a widely patent glottis. Overall unremarkable fiberoptic inspection of the larynx       No orders of the defined types were placed in this encounter. Please note that this chart was generated using dragon dictation software. Although every effort was made to ensure the accuracy of this automated transcription, some errors in transcription may have occurred.

## 2021-11-15 ENCOUNTER — HOSPITAL ENCOUNTER (OUTPATIENT)
Dept: ULTRASOUND IMAGING | Age: 31
Discharge: HOME | End: 2021-11-15
Payer: COMMERCIAL

## 2021-11-15 DIAGNOSIS — E04.1 THYROID NODULE: ICD-10-CM

## 2021-11-15 PROCEDURE — 88173 CYTOPATH EVAL FNA REPORT: CPT

## 2021-11-15 PROCEDURE — 88172 CYTP DX EVAL FNA 1ST EA SITE: CPT

## 2021-11-15 PROCEDURE — 60100 BIOPSY OF THYROID: CPT

## 2021-11-15 PROCEDURE — 88177 CYTP FNA EVAL EA ADDL: CPT

## 2021-11-16 DIAGNOSIS — F32.A DEPRESSION, UNSPECIFIED DEPRESSION TYPE: ICD-10-CM

## 2021-11-16 RX ORDER — DESVENLAFAXINE 25 MG/1
TABLET, EXTENDED RELEASE ORAL
Qty: 30 TABLET | Refills: 2 | Status: SHIPPED | OUTPATIENT
Start: 2021-11-16 | End: 2022-02-14

## 2021-11-29 ENCOUNTER — TELEPHONE (OUTPATIENT)
Dept: ENT CLINIC | Age: 31
End: 2021-11-29

## 2021-11-29 NOTE — TELEPHONE ENCOUNTER
Pt. States that she had needle biopsy and it was sent off. States insurance is not covering due to not medically necessary. She has questions in re: to this and unsure who she needs to speak with.

## 2021-12-06 ENCOUNTER — TELEPHONE (OUTPATIENT)
Dept: PRIMARY CARE CLINIC | Age: 31
End: 2021-12-06

## 2021-12-14 ENCOUNTER — OFFICE VISIT (OUTPATIENT)
Dept: ENT CLINIC | Age: 31
End: 2021-12-14
Payer: COMMERCIAL

## 2021-12-14 VITALS — BODY MASS INDEX: 23.39 KG/M2 | HEIGHT: 64 IN | WEIGHT: 137 LBS

## 2021-12-14 DIAGNOSIS — E04.1 THYROID NODULE: ICD-10-CM

## 2021-12-14 PROCEDURE — 99212 OFFICE O/P EST SF 10 MIN: CPT | Performed by: OTOLARYNGOLOGY

## 2021-12-14 NOTE — ASSESSMENT & PLAN NOTE
FINAL DIAGNOSIS:     Thyroid, left thyroid nodule fine-needle aspiration, ThinPrep and smears:   Adequate numbers of mildly atypical follicular groups, consistent with   follicular lesion of uncertain significance (FLUS), Chama category 3. Specimen sent out for genetic testing. Results returned with less than 3% risk of malignancy    Results reviewed with patient. Patient seemed to have good understanding regarding the nature of her results and the need for continued follow-up.   We will set her up for repeat ultrasound in 1 year

## 2021-12-14 NOTE — PROGRESS NOTES
22-year-old female returns today to review results of recent thyroid needle biopsy. The initial cytology came back as indeterminate so the specimen was sent off for genetics testing. After review was felt to have a very low likelihood of malignancy at 3% or less. I reviewed the results with her. Her examination was otherwise unchanged. I discussed the unlikelihood of the need for surgery based on today's result but she will need continued follow-up.   I will set her up for follow-up ultrasound in 1 year

## 2022-01-14 DIAGNOSIS — J30.89 ALLERGIC RHINITIS DUE TO OTHER ALLERGIC TRIGGER, UNSPECIFIED SEASONALITY: ICD-10-CM

## 2022-01-14 RX ORDER — PSEUDOEPHEDRINE HCL 120 MG
TABLET, EXTENDED RELEASE ORAL
Qty: 30 TABLET | Refills: 2 | Status: SHIPPED | OUTPATIENT
Start: 2022-01-14 | End: 2022-04-28

## 2022-01-26 ENCOUNTER — OFFICE VISIT (OUTPATIENT)
Dept: PRIMARY CARE CLINIC | Age: 32
End: 2022-01-26
Payer: COMMERCIAL

## 2022-01-26 VITALS
SYSTOLIC BLOOD PRESSURE: 125 MMHG | HEART RATE: 107 BPM | DIASTOLIC BLOOD PRESSURE: 80 MMHG | HEIGHT: 64 IN | WEIGHT: 135.4 LBS | BODY MASS INDEX: 23.12 KG/M2 | TEMPERATURE: 97.3 F | OXYGEN SATURATION: 98 %

## 2022-01-26 DIAGNOSIS — F32.A DEPRESSION, UNSPECIFIED DEPRESSION TYPE: ICD-10-CM

## 2022-01-26 DIAGNOSIS — L25.9 CONTACT DERMATITIS AND ECZEMA: ICD-10-CM

## 2022-01-26 DIAGNOSIS — R93.89 ABNORMAL THYROID ULTRASOUND: ICD-10-CM

## 2022-01-26 DIAGNOSIS — L30.9 ECZEMA, UNSPECIFIED TYPE: Primary | ICD-10-CM

## 2022-01-26 DIAGNOSIS — J30.89 ALLERGIC RHINITIS DUE TO OTHER ALLERGIC TRIGGER, UNSPECIFIED SEASONALITY: ICD-10-CM

## 2022-01-26 PROCEDURE — 99214 OFFICE O/P EST MOD 30 MIN: CPT | Performed by: NURSE PRACTITIONER

## 2022-01-26 RX ORDER — PREDNISONE 10 MG/1
TABLET ORAL
Qty: 1 EACH | Refills: 0 | Status: SHIPPED | OUTPATIENT
Start: 2022-01-26 | End: 2022-07-26 | Stop reason: ALTCHOICE

## 2022-01-26 RX ORDER — TRIAMCINOLONE ACETONIDE 0.25 MG/G
OINTMENT TOPICAL
Qty: 1 EACH | Refills: 0 | Status: SHIPPED | OUTPATIENT
Start: 2022-01-26 | End: 2022-02-02

## 2022-01-26 ASSESSMENT — ENCOUNTER SYMPTOMS
BLOOD IN STOOL: 0
NAUSEA: 0
ABDOMINAL PAIN: 0
WHEEZING: 0
TROUBLE SWALLOWING: 0
VOMITING: 0
CHEST TIGHTNESS: 0
RHINORRHEA: 0
SORE THROAT: 0
EYE REDNESS: 0
VOICE CHANGE: 0
COUGH: 0
DIARRHEA: 0
SHORTNESS OF BREATH: 0
CONSTIPATION: 0

## 2022-01-26 NOTE — PROGRESS NOTES
[address] [facility]  [address] [place] [address]  [id number]  [facility] [id number]  Dept: [phone]  Dept Fax: [phone]  Loc: [phone]        Bill Leal is a 36 y.o. female who presents today for her medical conditions/ complaints as noted below. Bill Leal is c/o Gastroesophageal Reflux (6 month follow up; patient reports of no concerns ) and Depression        Chief Complaint   Patient presents with    Gastroesophageal Reflux     6 month follow up; patient reports of no concerns     Depression       HPI:     Other  Associated symptoms include arthralgias and a rash (ezema behind bilateral ears. ). Pertinent negatives include no abdominal pain, chest pain, congestion, coughing, fatigue, fever, headaches, myalgias, nausea, sore throat or vomiting. Gastroesophageal Reflux  She reports no abdominal pain, no chest pain, no coughing, no nausea, no sore throat or no wheezing. Pertinent negatives include no fatigue. Abnormal menstrual cycle:   1/26/2022: Patient reports that menses have returned to normal.  She notes that she is no longer having menses every other week. She notes that her flow is within normal limits and is no longer having abdominal cramping. 9/7/2021: Pt reports every other week having a period. Notes that her period will last 4 days to one week. States that she has been using 3-4 tampons in a 24 hour day when she is on her period. Also complains of fatigue and abdominal cramping with menses. Thyroid enlargement:   1/26/2022 pt notes that she has had some thyroid enlargement. Pt underwent biopsy. Results below will follow up for repeat         Patient also request refills of her chronic medications. Patient reports that they have been compliant with taking medications as directed.      Past Medical History:   Diagnosis Date    Anemia     history    Eczema        Past Surgical History:   Procedure Laterality Date    HUMERUS FRACTURE SURGERY  07-01-11 I&D of Compound wound, ORIF LEFT humerus fracture on  07-01-11. [address] THYROID BIOPSY  11/15/2021     THYROID BIOPSY 11/15/2021 [facility]TRASOUND       Social History     Socioeconomic History    Marital status: Single     Spouse name: None    Number of children: None    Years of education: None    Highest education level: None   Occupational History    None   Tobacco Use    Smoking status: Never Smoker    Smokeless tobacco: Never Used   Vaping Use    Vaping Use: Never used   Substance and Sexual Activity    Alcohol use: No    Drug use: No    Sexual activity: None   Other Topics Concern    None   Social History Narrative    None     Social Determinants of Health     Financial Resource Strain:     Difficulty of Paying Living Expenses: Not on file   Food Insecurity:     Worried About Running Out of Food in the Last Year: Not on file    Arias of Food in the Last Year: Not on file   Transportation Needs:     Lack of Transportation (Medical): Not on file    Lack of Transportation (Non-Medical):  Not on file   Physical Activity:     Days of Exercise per Week: Not on file    Minutes of Exercise per Session: Not on file   Stress:     Feeling of Stress : Not on file   Social Connections:     Frequency of Communication with Friends and Family: Not on file    Frequency of Social Gatherings with Friends and Family: Not on file    Attends Latter day Services: Not on file    Active Member of [address] [place] [address] or Organizations: Not on file    Attends Club or Organization Meetings: Not on file    Marital Status: Not on file   Intimate Partner Violence:     Fear of Current or Ex-Partner: Not on file    Emotionally Abused: Not on file    Physically Abused: Not on file    Sexually Abused: Not on file   Housing Stability:     Unable to Pay for Housing in the Last Year: Not on file    Number of [place] in the Last Year: Not on file    Unstable Housing in the Last Year: Not on file Family History   Problem Relation Age of Onset    Diabetes Maternal Grandfather     Stroke Maternal Grandfather        Current Outpatient Medications   Medication Sig Dispense Refill    triamcinolone (KENALOG) 0.025 % ointment Apply topically 2 times daily. 1 each 0    predniSONE 10 MG (21) TBPK Take as directed per package instructions. 1 each 0    [facility] 12 HOUR NASAL DECONGESTANT 120 MG extended release tablet TAKE 1 TABLET BY MOUTH EVERY 12 HOURS 30 tablet 2    tiZANidine (ZANAFLEX) 4 MG tablet TAKE 1/2 TO 1 TABLET BY MOUTH AT BEDTIME (DO NOT DRIVE FOR 8 HRS AFTER TAKING) 90 tablet 0    desvenlafaxine succinate (PRISTIQ) 25 MG TB24 extended release tablet TAKE 1 TABLET BY MOUTH EVERY DAY 30 tablet 2    norgestimate-ethinyl estradiol (ORTHO TRI-CYCLEN LO) 0.025 MG tablet Take 1 tablet by mouth daily 30 tablet 11    pantoprazole (PROTONIX) 40 MG tablet TAKE 1 TABLET BY MOUTH EVERY DAY 30 tablet 5    montelukast (SINGULAIR) 10 MG tablet TAKE 1 TABLET BY MOUTH NIGHTLY PATIENT WILL NEED APPOINTMENT FOR FURTHER REFILLS 90 tablet 3    Crisaborole (EUCRISA) 2 % OINT APPLY TO SENSITIVE AREA(S) AND FACIAL ECZEMA TWICE A DAY 1 g 0    fexofenadine (ALLEGRA) 180 MG tablet TAKE 1 TABLET BY MOUTH EVERY DAY 30 tablet 2     No current facility-administered medications for this visit. Allergies   Allergen Reactions    Azithromycin Rash    Erythromycin Rash    Sulfa Antibiotics Rash       Lab Review not applicable  notapplicable    Subjective:   Review of Systems   Constitutional: Negative for activity change, appetite change, fatigue, fever and unexpected weight change. HENT: Positive for postnasal drip. Negative for congestion, ear pain, nosebleeds, rhinorrhea, sore throat, trouble swallowing and voice change. Eyes: Negative for redness and visual disturbance. Respiratory: Negative for cough, chest tightness, shortness of breath and wheezing.     Cardiovascular: Negative for chest pain, palpitations and leg swelling. Gastrointestinal: Negative for abdominal pain, blood in stool, constipation, diarrhea, nausea and vomiting. Endocrine: Negative for polydipsia, polyphagia and polyuria. Genitourinary: Negative for dysuria, frequency and urgency. Musculoskeletal: Positive for arthralgias. Negative for myalgias. Skin: Positive for rash (ezema behind bilateral ears. ). Negative for wound. Neurological: Negative for dizziness, speech difficulty, light-headedness and headaches. Psychiatric/Behavioral: Negative for agitation, confusion, self-injury and suicidal ideas. The patient is not nervous/anxious. Objective:     Physical Exam  Vitals and nursing note reviewed. Constitutional:       General: She is not in acute distress. Appearance: She is well-developed. She is not diaphoretic. HENT:      Head: Normocephalic and atraumatic. Right Ear: External ear normal.      Left Ear: External ear normal.      Nose: Nose normal.      Mouth/Throat:      Pharynx: No oropharyngeal exudate. Eyes:      General:         Right eye: No discharge. Left eye: No discharge. Conjunctiva/sclera: Conjunctivae normal.      Pupils: Pupils are equal, round, and reactive to light. Cardiovascular:      Rate and Rhythm: Normal rate and regular rhythm. Heart sounds: Normal heart sounds. No murmur heard. Pulmonary:      Effort: Pulmonary effort is normal. No respiratory distress. Breath sounds: Normal breath sounds. No stridor. No wheezing or rales. Chest:      Chest wall: No tenderness. Breasts:      Right: No inverted nipple, mass, nipple discharge, skin change or tenderness. Left: No inverted nipple, mass, nipple discharge, skin change or tenderness. Abdominal:      General: Bowel sounds are normal. There is no distension. Palpations: Abdomen is soft. Tenderness: There is no abdominal tenderness. Musculoskeletal:         General: No tenderness or deformity. Normal range of motion. Cervical back: Normal range of motion and neck supple. Skin:     General: Skin is warm and dry. Findings: No erythema or rash. Neurological:      Mental Status: She is alert and oriented to person, place, and time. Cranial Nerves: No cranial nerve deficit. Coordination: Coordination normal.      Deep Tendon Reflexes: Reflexes are normal and symmetric. Psychiatric:         Behavior: Behavior normal.         Thought Content: Thought content normal.               /80   Pulse 107   Temp 97.3 °F (36.3 °C) (Temporal)   Ht 5' 4\" (1.626 m)   Wt 135 lb 6.4 oz (61.4 kg)   SpO2 98%   BMI 23.24 kg/m²     Assessment:      Diagnosis Orders   1. Eczema, unspecified type  triamcinolone (KENALOG) 0.025 % ointment    predniSONE 10 MG (21) TBPK   2. Contact dermatitis and eczema  triamcinolone (KENALOG) 0.025 % ointment    predniSONE 10 MG (21) TBPK   3. Allergic rhinitis due to other allergic trigger, unspecified seasonality     4. Depression, unspecified depression type     5. Abnormal thyroid ultrasound       No results found for this visit on 01/26/22. Plan:     1. Eczema, unspecified type    2. Contact dermatitis and eczema    3. Allergic rhinitis due to other allergic trigger, unspecified seasonality    4. Depression, unspecified depression type    5. Abnormal thyroid ultrasound      Over 50% of the total visit time of 30 minutes was spent on counseling and or coordination of care of menorrhagia with irregular cycle, birth control counseling, burn, thyroid nodule, need for tdap. We will move forward with testing at patient's request.  All medications have been refilled. Return in about 6 months (around 7/26/2022). No orders of the defined types were placed in this encounter. Orders Placed This Encounter   Medications    triamcinolone (KENALOG) 0.025 % ointment     Sig: Apply topically 2 times daily.      Dispense:  1 each     Refill:  0    predniSONE 10 MG (21) TBPK     Sig: Take as directed per package instructions. Dispense:  1 each     Refill:  0       Patient Instructions   Begin prednisone Dosepak for eczema exacerbation    Apply triamcinolone cream to posterior left ear. Please apply a minimal amount to this area for skin is thin and will be prone to scarring. Continue other medications follow-up in 6 months.          /family given educational materials - see patient instructions. Discussed use, benefit, and side effects of prescribed medications. All patient/family questions answered and voiced understanding. Instructed to continue current medications, diet and exercise. Pt/family agreed with treatment plan. Follow up as directed and sooner if needed. Patient/ family instructed that is symptoms worsen or persist they are to contact office or report to nearest ER. They voice understanding and agreement with this plan.   signed by LIBIA Dockery on 1/30/2022 at 1:18 PM CDT    This dictation was generated by voice recognition computer software. Although all attempts are made to edit the dictation for accuracy, there may be errors in the transcription that are not intended.

## 2022-01-26 NOTE — PATIENT INSTRUCTIONS
Begin prednisone Dosepak for eczema exacerbation    Apply triamcinolone cream to posterior left ear. Please apply a minimal amount to this area for skin is thin and will be prone to scarring. Continue other medications follow-up in 6 months.

## 2022-02-14 DIAGNOSIS — F32.A DEPRESSION, UNSPECIFIED DEPRESSION TYPE: ICD-10-CM

## 2022-02-14 RX ORDER — DESVENLAFAXINE 25 MG/1
TABLET, EXTENDED RELEASE ORAL
Qty: 30 TABLET | Refills: 2 | Status: SHIPPED | OUTPATIENT
Start: 2022-02-14 | End: 2022-05-16

## 2022-02-23 DIAGNOSIS — K21.9 GASTROESOPHAGEAL REFLUX DISEASE WITHOUT ESOPHAGITIS: ICD-10-CM

## 2022-02-23 RX ORDER — PANTOPRAZOLE SODIUM 40 MG/1
TABLET, DELAYED RELEASE ORAL
Qty: 90 TABLET | Refills: 1 | Status: SHIPPED | OUTPATIENT
Start: 2022-02-23 | End: 2022-08-26

## 2022-02-25 RX ORDER — TIZANIDINE 4 MG/1
TABLET ORAL
Qty: 90 TABLET | Refills: 0 | Status: SHIPPED | OUTPATIENT
Start: 2022-02-25 | End: 2022-03-21

## 2022-02-25 NOTE — TELEPHONE ENCOUNTER
Garymario Liz called to request a refill on her medication.       Last office visit : 1/26/2022   Next office visit : 7/26/2022     Requested Prescriptions     Pending Prescriptions Disp Refills    tiZANidine (ZANAFLEX) 4 MG tablet [Pharmacy Med Name: TIZANIDINE HCL 4 MG TABLET] 90 tablet 0     Sig: TAKE 1/2 TO 1 TABLET BY MOUTH AT BEDTIME (DO NOT DRIVE FOR 8 HRS AFTER TAKING)            Mariza Vann

## 2022-03-21 RX ORDER — TIZANIDINE 4 MG/1
TABLET ORAL
Qty: 90 TABLET | Refills: 0 | Status: SHIPPED | OUTPATIENT
Start: 2022-03-21 | End: 2022-06-24

## 2022-03-21 NOTE — TELEPHONE ENCOUNTER
Margarita Whipple called to request a refill on her medication.       Last office visit : 1/26/2022   Next office visit : 7/26/2022     Requested Prescriptions     Pending Prescriptions Disp Refills    tiZANidine (ZANAFLEX) 4 MG tablet [Pharmacy Med Name: TIZANIDINE HCL 4 MG TABLET] 90 tablet 0     Sig: TAKE 1/2 TO 1 TABLET BY MOUTH AT BEDTIME (DO NOT DRIVE FOR 8 HRS AFTER TAKING)            Jitendra Connors

## 2022-04-27 DIAGNOSIS — J30.89 ALLERGIC RHINITIS DUE TO OTHER ALLERGIC TRIGGER, UNSPECIFIED SEASONALITY: ICD-10-CM

## 2022-04-28 RX ORDER — PSEUDOEPHEDRINE HCL 120 MG
TABLET, EXTENDED RELEASE ORAL
Qty: 30 TABLET | Refills: 2 | Status: SHIPPED | OUTPATIENT
Start: 2022-04-28 | End: 2022-07-24

## 2022-04-28 NOTE — TELEPHONE ENCOUNTER
Carolin Castle called to request a refill on her medication.       Last office visit : 1/26/2022   Next office visit : 7/26/2022     Requested Prescriptions     Pending Prescriptions Disp Refills    [facility] 12 HOUR NASAL DECONGESTANT 120 MG extended release tablet [Pharmacy Med Name: [facility] 12HR DECONGEST 120 MG CPLT] 30 tablet 2     Sig: TAKE 1 TABLET BY MOUTH EVERY [address]

## 2022-05-15 DIAGNOSIS — F32.A DEPRESSION, UNSPECIFIED DEPRESSION TYPE: ICD-10-CM

## 2022-05-16 RX ORDER — DESVENLAFAXINE 25 MG/1
TABLET, EXTENDED RELEASE ORAL
Qty: 30 TABLET | Refills: 2 | Status: SHIPPED | OUTPATIENT
Start: 2022-05-16 | End: 2022-08-12

## 2022-05-17 ENCOUNTER — OFFICE VISIT (OUTPATIENT)
Age: 32
End: 2022-05-17
Payer: COMMERCIAL

## 2022-05-17 VITALS
HEIGHT: 64 IN | BODY MASS INDEX: 23.42 KG/M2 | DIASTOLIC BLOOD PRESSURE: 88 MMHG | OXYGEN SATURATION: 100 % | RESPIRATION RATE: 20 BRPM | WEIGHT: 137.2 LBS | TEMPERATURE: 98.5 F | SYSTOLIC BLOOD PRESSURE: 126 MMHG | HEART RATE: 93 BPM

## 2022-05-17 DIAGNOSIS — R05.8 POST-VIRAL COUGH SYNDROME: ICD-10-CM

## 2022-05-17 DIAGNOSIS — L23.9 ALLERGIC DERMATITIS: Primary | ICD-10-CM

## 2022-05-17 PROCEDURE — 99213 OFFICE O/P EST LOW 20 MIN: CPT | Performed by: NURSE PRACTITIONER

## 2022-05-17 RX ORDER — BENZONATATE 100 MG/1
100 CAPSULE ORAL 3 TIMES DAILY PRN
Qty: 21 CAPSULE | Refills: 0 | Status: SHIPPED | OUTPATIENT
Start: 2022-05-17 | End: 2022-05-24

## 2022-05-17 RX ORDER — TRIAMCINOLONE ACETONIDE 1 MG/G
CREAM TOPICAL
Qty: 15 G | Refills: 0 | Status: SHIPPED | OUTPATIENT
Start: 2022-05-17

## 2022-05-17 RX ORDER — METHYLPREDNISOLONE 4 MG/1
TABLET ORAL
Qty: 1 KIT | Refills: 0 | Status: SHIPPED | OUTPATIENT
Start: 2022-05-17 | End: 2022-07-26 | Stop reason: ALTCHOICE

## 2022-05-17 ASSESSMENT — ENCOUNTER SYMPTOMS
BLOOD IN STOOL: 0
VOMITING: 0
EYE DISCHARGE: 0
RHINORRHEA: 0
SINUS PRESSURE: 0
WHEEZING: 0
COUGH: 1
ABDOMINAL PAIN: 0
NAUSEA: 0
EYE ITCHING: 0
CONSTIPATION: 0
SORE THROAT: 0
COLOR CHANGE: 0
SHORTNESS OF BREATH: 0
DIARRHEA: 0

## 2022-05-17 NOTE — PATIENT INSTRUCTIONS
Patient Education        Dermatitis: Care Instructions  Overview  Dermatitis is the general name used for any rash or inflammation of the skin. Different kinds of dermatitis cause different kinds of rashes. Common causes of a rash include new medicines, plants (such as poison oak or poison ivy), heat,and stress. Certain illnesses can also cause a rash. An allergic reaction to something that touches your skin, such as latex, nickel, or poison ivy, is called contact dermatitis. Contact dermatitis may also be caused by something that irritates the skin, such as bleach, achemical, or soap. These types of rashes cannot be spread from person to person. How long your rash will last depends on what caused it. Rashes may last a fewdays or months. Follow-up care is a key part of your treatment and safety. Be sure to make and go to all appointments, and call your doctor if you are having problems. It's also a good idea to know your test results and keep alist of the medicines you take. How can you care for yourself at home?  Do not scratch the rash. Cut your nails short, and file them smooth. Or wear gloves if this helps keep you from scratching.  Wash the area with water only. Pat dry.  Put cold, wet cloths on the rash to reduce itching.  Keep cool, and stay out of the sun.  Leave the rash open to the air as much as possible.  If the rash itches, use hydrocortisone cream. Follow the directions on the label. Calamine lotion may help for plant rashes.  If itching affects your sleep, ask your doctor if you can take an antihistamine that might reduce itching and make you sleepy, such as diphenhydramine (Benadryl). Be safe with medicines. Read and follow all instructions on the label.  If your doctor prescribed a cream, use it as directed. If your doctor prescribed medicine, take it exactly as directed. When should you call for help?    Call your doctor now or seek immediate medical care if:     You have symptoms of infection, such as:  ? Increased pain, swelling, warmth, or redness. ? Red streaks leading from the area. ? Pus draining from the area. ? A fever.      You have joint pain along with the rash. Watch closely for changes in your health, and be sure to contact your doctor if:     Your rash is changing or getting worse.      You are not getting better as expected. Where can you learn more? Go to https://[employer]. org and sign in to your [employer] account. Enter [phone] in the 5 CUPS and some sugar box to learn more about \"Dermatitis: Care Instructions. \"     If you do not have an account, please click on the \"Sign Up Now\" link. Current as of: November 15, 2021               Content Version: 13.2  © 4362-7645 [employer]. Care instructions adapted under license by [facility] ([facility]). If you have questions about a medical condition or this instruction, always ask your healthcare professional. [employer] disclaims any warranty or liability for your use of this information.       - Take cough medication as needed  - Use triamcinolone as needed  - Take full course of medrol pack  - Increase fluid intake  - The patient is to follow up with PCP or return to clinic if symptoms worsen/fail to improve.

## 2022-05-17 NOTE — PROGRESS NOTES
[id number]  [address] [facility] [address] [address]  Dept: [phone]  Dept Fax: [phone]  Loc: [phone]    Marley Loza is a 36 y.o. female who presents today for her medical conditions/complaints as noted below. Marley Loza is complaining of Cough and Rash        HPI:   Cough  This is a new problem. The current episode started 1 to 4 weeks ago. The problem has been waxing and waning. The problem occurs every few minutes. Associated symptoms include nasal congestion and a rash. Pertinent negatives include no chest pain, chills, ear pain, fever, headaches, myalgias, rhinorrhea, sore throat, shortness of breath or wheezing. The symptoms are aggravated by lying down. Treatments tried: minal Byrnes. No known COVID or flu exposure recently      Past Medical History:   Diagnosis Date    Anemia     history    Depression     Eczema     GERD (gastroesophageal reflux disease)        Past Surgical History:   Procedure Laterality Date    HUMERUS FRACTURE SURGERY  07-01-11    I&D of Compound wound, ORIF LEFT humerus fracture on  07-01-11. [address] THYROID BIOPSY  11/15/2021     THYROID BIOPSY 11/15/2021 [facility] ULTRASOUND       Family History   Problem Relation Age of Onset    Diabetes Maternal Grandfather     Stroke Maternal Grandfather        Social History     Tobacco Use    Smoking status: Never Smoker    Smokeless tobacco: Never Used   Substance Use Topics    Alcohol use: No        Current Outpatient Medications   Medication Sig Dispense Refill    triamcinolone (KENALOG) 0.1 % cream Apply topically 2 times daily. 15 g 0    methylPREDNISolone (MEDROL DOSEPACK) 4 MG tablet Take by mouth.  1 kit 0    benzonatate (TESSALON) 100 MG capsule Take 1 capsule by mouth 3 times daily as needed for Cough 21 capsule 0    desvenlafaxine succinate (PRISTIQ) 25 MG TB24 extended release tablet TAKE 1 TABLET BY MOUTH EVERY DAY 30 tablet 2    [facility] 12 HOUR NASAL DECONGESTANT 120 MG extended release tablet TAKE 1 TABLET BY MOUTH EVERY 12 HOURS 30 tablet 2    tiZANidine (ZANAFLEX) 4 MG tablet TAKE 1/2 TO 1 TABLET BY MOUTH AT BEDTIME (DO NOT DRIVE FOR 8 HRS AFTER TAKING) 90 tablet 0    pantoprazole (PROTONIX) 40 MG tablet TAKE 1 TABLET BY MOUTH EVERY DAY 90 tablet 1    norgestimate-ethinyl estradiol (ORTHO TRI-CYCLEN LO) 0.025 MG tablet Take 1 tablet by mouth daily 30 tablet 11    Crisaborole (EUCRISA) 2 % OINT APPLY TO SENSITIVE AREA(S) AND FACIAL ECZEMA TWICE A DAY 1 g 0    fexofenadine (ALLEGRA) 180 MG tablet TAKE 1 TABLET BY MOUTH EVERY DAY 30 tablet 2    predniSONE 10 MG (21) TBPK Take as directed per package instructions. (Patient not taking: Reported on 5/17/2022) 1 each 0    montelukast (SINGULAIR) 10 MG tablet TAKE 1 TABLET BY MOUTH NIGHTLY PATIENT WILL NEED APPOINTMENT FOR FURTHER REFILLS 90 tablet 3     No current facility-administered medications for this visit. Allergies   Allergen Reactions    Azithromycin Rash    Erythromycin Rash    Sulfa Antibiotics Rash       Health Maintenance   Topic Date Due    Varicella vaccine (1 of 2 - 2-dose childhood series) Never done    HIV screen  Never done    Hepatitis C screen  Never done    Pap smear  Never done    COVID-19 Vaccine (1) 12/31/2022 (Originally 12/10/1997)    Flu vaccine (Season Ended) 01/26/2023 (Originally 9/1/2022)    Depression Monitoring  09/07/2022    DTaP/Tdap/Td vaccine (2 - Tdap) 09/17/2031    Hepatitis A vaccine  Aged Out    Hepatitis B vaccine  Aged Out    Hib vaccine  Aged Out    Meningococcal (ACWY) vaccine  Aged Out    Pneumococcal 0-64 years Vaccine  Aged Out       Subjective:   Review of Systems   Constitutional: Negative for activity change, appetite change, chills, fatigue and fever. HENT: Positive for congestion. Negative for ear pain, rhinorrhea, sinus pressure and sore throat.     Eyes: Negative for discharge and itching. Respiratory: Positive for cough. Negative for shortness of breath and wheezing. Cardiovascular: Negative for chest pain. Gastrointestinal: Negative for abdominal pain, blood in stool, constipation, diarrhea, nausea and vomiting. Musculoskeletal: Negative for myalgias. Skin: Positive for rash. Negative for color change. Neurological: Negative for dizziness and headaches. All other systems reviewed and are negative. Objective    Physical Exam  Vitals and nursing note reviewed. Constitutional:       General: She is not in acute distress. Appearance: Normal appearance. HENT:      Head: Normocephalic and atraumatic. Right Ear: Tympanic membrane and ear canal normal.      Left Ear: Tympanic membrane and ear canal normal.      Mouth/Throat:      Mouth: Mucous membranes are moist.      Pharynx: No posterior oropharyngeal erythema. Eyes:      Extraocular Movements: Extraocular movements intact. Pupils: Pupils are equal, round, and reactive to light. Cardiovascular:      Rate and Rhythm: Normal rate and regular rhythm. Pulses: Normal pulses. Heart sounds: Normal heart sounds. No murmur heard. Pulmonary:      Effort: Pulmonary effort is normal. No respiratory distress. Breath sounds: Normal breath sounds. No wheezing. Skin:     General: Skin is warm. Capillary Refill: Capillary refill takes less than 2 seconds. Coloration: Skin is not pale. Findings: No rash. Neurological:      General: No focal deficit present. Mental Status: She is alert and oriented to person, place, and time. Psychiatric:         Attention and Perception: Attention normal.         Mood and Affect: Mood normal.         Behavior: Behavior normal. Behavior is cooperative. Thought Content:  Thought content normal.         /88   Pulse 93   Temp 98.5 °F (36.9 °C)   Resp 20   Ht 5' 4\" (1.626 m)   Wt 137 lb 3.2 oz (62.2 kg)   LMP 05/14/2022   SpO2 100%   BMI 23.55 kg/m²     Assessment         Diagnosis Orders   1. Allergic dermatitis     2. Post-viral cough syndrome         Plan   - Take cough medication as needed  - Use triamcinolone as needed  - Take full course of medrol pack  - Increase fluid intake  - The patient is to follow up with PCP or return to clinic if symptoms worsen/fail to improve. Orders Placed This Encounter   Medications    triamcinolone (KENALOG) 0.1 % cream     Sig: Apply topically 2 times daily. Dispense:  15 g     Refill:  0    methylPREDNISolone (MEDROL DOSEPACK) 4 MG tablet     Sig: Take by mouth. Dispense:  1 kit     Refill:  0    benzonatate (TESSALON) 100 MG capsule     Sig: Take 1 capsule by mouth 3 times daily as needed for Cough     Dispense:  21 capsule     Refill:  0      New Prescriptions    BENZONATATE (TESSALON) 100 MG CAPSULE    Take 1 capsule by mouth 3 times daily as needed for Cough    METHYLPREDNISOLONE (MEDROL DOSEPACK) 4 MG TABLET    Take by mouth. TRIAMCINOLONE (KENALOG) 0.1 % CREAM    Apply topically 2 times daily. Return if symptoms worsen or fail to improve. Discussed use, benefits, and side effects of any prescribed medications. All patient questions were answered. Patient voiced understanding of care plan. Patient was given educational materials - see patient instructions below. Patient Instructions       Patient Education        Dermatitis: Care Instructions  Overview  Dermatitis is the general name used for any rash or inflammation of the skin. Different kinds of dermatitis cause different kinds of rashes. Common causes of a rash include new medicines, plants (such as poison oak or poison ivy), heat,and stress. Certain illnesses can also cause a rash. An allergic reaction to something that touches your skin, such as latex, nickel, or poison ivy, is called contact dermatitis.  Contact dermatitis may also be caused by something that irritates the skin, such as bleach, achemical, or soap. These types of rashes cannot be spread from person to person. How long your rash will last depends on what caused it. Rashes may last a fewdays or months. Follow-up care is a key part of your treatment and safety. Be sure to make and go to all appointments, and call your doctor if you are having problems. It's also a good idea to know your test results and keep alist of the medicines you take. How can you care for yourself at home?  Do not scratch the rash. Cut your nails short, and file them smooth. Or wear gloves if this helps keep you from scratching.  Wash the area with water only. Pat dry.  Put cold, wet cloths on the rash to reduce itching.  Keep cool, and stay out of the sun.  Leave the rash open to the air as much as possible.  If the rash itches, use hydrocortisone cream. Follow the directions on the label. Calamine lotion may help for plant rashes.  If itching affects your sleep, ask your doctor if you can take an antihistamine that might reduce itching and make you sleepy, such as diphenhydramine (Benadryl). Be safe with medicines. Read and follow all instructions on the label.  If your doctor prescribed a cream, use it as directed. If your doctor prescribed medicine, take it exactly as directed. When should you call for help? Call your doctor now or seek immediate medical care if:     You have symptoms of infection, such as:  ? Increased pain, swelling, warmth, or redness. ? Red streaks leading from the area. ? Pus draining from the area. ? A fever.      You have joint pain along with the rash. Watch closely for changes in your health, and be sure to contact your doctor if:     Your rash is changing or getting worse.      You are not getting better as expected. Where can you learn more? Go to [email or web address] and sign in to your [employer] account.  Enter [phone] in the [facility] box to learn more about \"Dermatitis: Care Instructions. \"     If you do not have an account, please click on the \"Sign Up Now\" link. Current as of: November 15, 2021               Content Version: 13.2  © 8904-4971 [employer]wise, Incorporated. Care instructions adapted under license by [facility] ([facility]). If you have questions about a medical condition or this instruction, always ask your healthcare professional. [facility] disclaims any warranty or liability for your use of this information.       - Take cough medication as needed  - Use triamcinolone as needed  - Take full course of medrol pack  - Increase fluid intake  - The patient is to follow up with PCP or return to clinic if symptoms worsen/fail to improve.         Electronically signed by LIBIA Aguilar CNP on 5/17/2022 at 2:45 PM

## 2022-06-24 RX ORDER — TIZANIDINE 4 MG/1
TABLET ORAL
Qty: 90 TABLET | Refills: 0 | Status: SHIPPED | OUTPATIENT
Start: 2022-06-24 | End: 2022-09-14

## 2022-06-24 NOTE — TELEPHONE ENCOUNTER
Roge Garcia called to request a refill on her medication.       Last office visit : 1/26/2022   Next office visit : 7/26/2022     Requested Prescriptions     Pending Prescriptions Disp Refills    tiZANidine (ZANAFLEX) 4 MG tablet [Pharmacy Med Name: TIZANIDINE HCL 4 MG TABLET] 90 tablet 0     Sig: TAKE 1/2 TO 1 TABLET BY MOUTH AT BEDTIME (DO NOT DRIVE FOR 8 HRS AFTER TAKING)            Kate Coyne

## 2022-07-23 DIAGNOSIS — J30.89 ALLERGIC RHINITIS DUE TO OTHER ALLERGIC TRIGGER, UNSPECIFIED SEASONALITY: ICD-10-CM

## 2022-07-23 DIAGNOSIS — Z30.09 BIRTH CONTROL COUNSELING: ICD-10-CM

## 2022-07-24 RX ORDER — PSEUDOEPHEDRINE HCL 120 MG
TABLET, EXTENDED RELEASE ORAL
Qty: 30 TABLET | Refills: 2 | Status: SHIPPED | OUTPATIENT
Start: 2022-07-24 | End: 2022-10-21

## 2022-07-24 RX ORDER — NORGESTIMATE AND ETHINYL ESTRADIOL
KIT
Qty: 84 TABLET | Refills: 3 | Status: SHIPPED | OUTPATIENT
Start: 2022-07-24

## 2022-07-26 ENCOUNTER — HOSPITAL ENCOUNTER (OUTPATIENT)
Dept: GENERAL RADIOLOGY | Age: 32
Discharge: HOME | End: 2022-07-26
Payer: COMMERCIAL

## 2022-07-26 ENCOUNTER — OFFICE VISIT (OUTPATIENT)
Dept: PRIMARY CARE CLINIC | Age: 32
End: 2022-07-26
Payer: COMMERCIAL

## 2022-07-26 VITALS
SYSTOLIC BLOOD PRESSURE: 128 MMHG | DIASTOLIC BLOOD PRESSURE: 86 MMHG | HEIGHT: 64 IN | OXYGEN SATURATION: 99 % | TEMPERATURE: 97.4 F | HEART RATE: 96 BPM | BODY MASS INDEX: 23.76 KG/M2 | WEIGHT: 139.2 LBS

## 2022-07-26 DIAGNOSIS — M67.431 GANGLION CYST OF WRIST, RIGHT: ICD-10-CM

## 2022-07-26 DIAGNOSIS — M25.531 BILATERAL WRIST PAIN: ICD-10-CM

## 2022-07-26 DIAGNOSIS — E04.1 THYROID NODULE: ICD-10-CM

## 2022-07-26 DIAGNOSIS — M25.532 PAIN IN BOTH WRISTS: ICD-10-CM

## 2022-07-26 DIAGNOSIS — F32.A DEPRESSION, UNSPECIFIED DEPRESSION TYPE: Primary | ICD-10-CM

## 2022-07-26 DIAGNOSIS — M25.531 PAIN IN BOTH WRISTS: ICD-10-CM

## 2022-07-26 DIAGNOSIS — N92.1 MENORRHAGIA WITH IRREGULAR CYCLE: ICD-10-CM

## 2022-07-26 DIAGNOSIS — M25.531 RIGHT WRIST PAIN: ICD-10-CM

## 2022-07-26 DIAGNOSIS — R93.89 ABNORMAL THYROID ULTRASOUND: ICD-10-CM

## 2022-07-26 DIAGNOSIS — J30.89 ALLERGIC RHINITIS DUE TO OTHER ALLERGIC TRIGGER, UNSPECIFIED SEASONALITY: ICD-10-CM

## 2022-07-26 DIAGNOSIS — R03.0 ELEVATED BP WITHOUT DIAGNOSIS OF HYPERTENSION: ICD-10-CM

## 2022-07-26 DIAGNOSIS — M25.532 BILATERAL WRIST PAIN: ICD-10-CM

## 2022-07-26 DIAGNOSIS — K21.9 GASTROESOPHAGEAL REFLUX DISEASE WITHOUT ESOPHAGITIS: ICD-10-CM

## 2022-07-26 DIAGNOSIS — M19.90 ARTHRITIS: ICD-10-CM

## 2022-07-26 DIAGNOSIS — L30.9 ECZEMA, UNSPECIFIED TYPE: ICD-10-CM

## 2022-07-26 PROBLEM — M71.339: Status: ACTIVE | Noted: 2022-07-26

## 2022-07-26 PROCEDURE — 73110 X-RAY EXAM OF WRIST: CPT | Performed by: RADIOLOGY

## 2022-07-26 PROCEDURE — 73110 X-RAY EXAM OF WRIST: CPT | Performed by: NURSE PRACTITIONER

## 2022-07-26 PROCEDURE — 99214 OFFICE O/P EST MOD 30 MIN: CPT | Performed by: NURSE PRACTITIONER

## 2022-07-26 PROCEDURE — 73110 X-RAY EXAM OF WRIST: CPT

## 2022-07-26 ASSESSMENT — PATIENT HEALTH QUESTIONNAIRE - PHQ9
SUM OF ALL RESPONSES TO PHQ9 QUESTIONS 1 & 2: 0
SUM OF ALL RESPONSES TO PHQ QUESTIONS 1-9: 0
6. FEELING BAD ABOUT YOURSELF - OR THAT YOU ARE A FAILURE OR HAVE LET YOURSELF OR YOUR FAMILY DOWN: 0
7. TROUBLE CONCENTRATING ON THINGS, SUCH AS READING THE NEWSPAPER OR WATCHING TELEVISION: 0
1. LITTLE INTEREST OR PLEASURE IN DOING THINGS: 0
3. TROUBLE FALLING OR STAYING ASLEEP: 0
5. POOR APPETITE OR OVEREATING: 0
8. MOVING OR SPEAKING SO SLOWLY THAT OTHER PEOPLE COULD HAVE NOTICED. OR THE OPPOSITE, BEING SO FIGETY OR RESTLESS THAT YOU HAVE BEEN MOVING AROUND A LOT MORE THAN USUAL: 0
2. FEELING DOWN, DEPRESSED OR HOPELESS: 0
SUM OF ALL RESPONSES TO PHQ QUESTIONS 1-9: 0
SUM OF ALL RESPONSES TO PHQ QUESTIONS 1-9: 0
4. FEELING TIRED OR HAVING LITTLE ENERGY: 0
SUM OF ALL RESPONSES TO PHQ QUESTIONS 1-9: 0
9. THOUGHTS THAT YOU WOULD BE BETTER OFF DEAD, OR OF HURTING YOURSELF: 0
10. IF YOU CHECKED OFF ANY PROBLEMS, HOW DIFFICULT HAVE THESE PROBLEMS MADE IT FOR YOU TO DO YOUR WORK, TAKE CARE OF THINGS AT HOME, OR GET ALONG WITH OTHER PEOPLE: 0

## 2022-07-26 ASSESSMENT — ENCOUNTER SYMPTOMS
RHINORRHEA: 0
ABDOMINAL PAIN: 0
VOMITING: 0
CHEST TIGHTNESS: 0
EYE REDNESS: 0
CONSTIPATION: 0
SORE THROAT: 0
SHORTNESS OF BREATH: 0
VOICE CHANGE: 0
WHEEZING: 0
NAUSEA: 0
BLOOD IN STOOL: 0
DIARRHEA: 0
COUGH: 0
TROUBLE SWALLOWING: 0

## 2022-07-26 NOTE — PROGRESS NOTES
[facility] PRIMARY CARE  [facility] [facility]  [id number]  [address] [address]  Dept: [phone]  Dept Fax: [phone]  Loc: [phone]        Hamilton Hernandez is a 36 y.o. female who presents today for her medical conditions/ complaints as noted below. Hamilton Hernandez is c/o 6 Month Follow-Up and Tendonitis (Pt states she has a lump on her right wrist/)        Chief Complaint   Patient presents with    6 Month Follow-Up    Tendonitis     Pt states she has a lump on her right wrist           HPI:     Gastroesophageal Reflux  She reports no abdominal pain, no chest pain, no coughing, no nausea, no sore throat or no wheezing. Pertinent negatives include no fatigue. Other  Associated symptoms include arthralgias and a rash (ezema behind bilateral ears. ). Pertinent negatives include no abdominal pain, chest pain, congestion, coughing, fatigue, fever, headaches, myalgias, nausea, sore throat or vomiting. Bilateral wrist pain/ cyst noted to right wrist:   7/26/2022: x 2 months. Hurts to touch cyst like area that is 5mm in size. Hard tender. Notes bilateral wrist pain with movement. States that wrist pain improves when she has been off from work for several days. Abnormal menstrual cycle:   7/26/2022: No issues. Doing well on current b/c regimen. 1/26/2022: Patient reports that menses have returned to normal.  She notes that she is no longer having menses every other week. She notes that her flow is within normal limits and is no longer having abdominal cramping. 9/7/2021: Pt reports every other week having a period. Notes that her period will last 4 days to one week. States that she has been using 3-4 tampons in a 24 hour day when she is on her period. Also complains of fatigue and abdominal cramping with menses. Thyroid enlargement:   7/26/2022: will repeat thyroid ultrasound in December 2022.   1/26/2022 pt notes that she has had some thyroid enlargement. Pt underwent biopsy. Results below will follow up for repeat         Patient also request refills of her chronic medications. Patient reports that they have been compliant with taking medications as directed. Past Medical History:   Diagnosis Date    Anemia     history    Depression     Eczema     GERD (gastroesophageal reflux disease)        Past Surgical History:   Procedure Laterality Date    HUMERUS FRACTURE SURGERY  07-01-11    I&D of Compound wound, ORIF LEFT humerus fracture on  07-01-11. TONSILLECTOMY      US THYROID BIOPSY  11/15/2021    US THYROID BIOPSY 11/15/2021 [facility] ULTRASOUND       Social History     Socioeconomic History    Marital status: Single     Spouse name: None    Number of children: None    Years of education: None    Highest education level: None   Tobacco Use    Smoking status: Never    Smokeless tobacco: Never   Vaping Use    Vaping Use: Never used   Substance and Sexual Activity    Alcohol use: No    Drug use: No       Family History   Problem Relation Age of Onset    Diabetes Maternal Grandfather     Stroke Maternal Grandfather        Current Outpatient Medications   Medication Sig Dispense Refill    TRI-LO-DWAINE 0.18/0.215/0.25 MG-25 MCG TABS TAKE 1 TABLET BY MOUTH EVERY DAY 84 tablet 3    [facility] 12 HOUR NASAL DECONGESTANT 120 MG extended release tablet TAKE 1 TABLET BY MOUTH EVERY 12 HOURS 30 tablet 2    tiZANidine (ZANAFLEX) 4 MG tablet TAKE 1/2 TO 1 TABLET BY MOUTH AT BEDTIME (DO NOT DRIVE FOR 8 HRS AFTER TAKING) 90 tablet 0    triamcinolone (KENALOG) 0.1 % cream Apply topically 2 times daily.  15 g 0    desvenlafaxine succinate (PRISTIQ) 25 MG TB24 extended release tablet TAKE 1 TABLET BY MOUTH EVERY DAY 30 tablet 2    pantoprazole (PROTONIX) 40 MG tablet TAKE 1 TABLET BY MOUTH EVERY DAY 90 tablet 1    montelukast (SINGULAIR) 10 MG tablet TAKE 1 TABLET BY MOUTH NIGHTLY PATIENT WILL NEED APPOINTMENT FOR FURTHER REFILLS 90 tablet 3    Crisaborole (EUCRISA) 2 % OINT APPLY TO SENSITIVE AREA(S) AND FACIAL ECZEMA TWICE A DAY 1 g 0    fexofenadine (ALLEGRA) 180 MG tablet TAKE 1 TABLET BY MOUTH EVERY DAY 30 tablet 2     No current facility-administered medications for this visit. Allergies   Allergen Reactions    Azithromycin Rash    Erythromycin Rash    Sulfa Antibiotics Rash       Lab Review not applicable  notapplicable    Subjective:   Review of Systems   Constitutional:  Negative for activity change, appetite change, fatigue, fever and unexpected weight change. HENT:  Positive for postnasal drip. Negative for congestion, ear pain, nosebleeds, rhinorrhea, sore throat, trouble swallowing and voice change. Eyes:  Negative for redness and visual disturbance. Respiratory:  Negative for cough, chest tightness, shortness of breath and wheezing. Cardiovascular:  Negative for chest pain, palpitations and leg swelling. Gastrointestinal:  Negative for abdominal pain, blood in stool, constipation, diarrhea, nausea and vomiting. Endocrine: Negative for polydipsia, polyphagia and polyuria. Genitourinary:  Negative for dysuria, frequency and urgency. Musculoskeletal:  Positive for arthralgias. Negative for myalgias. Skin:  Positive for rash (ezema behind bilateral ears. ). Negative for wound. Neurological:  Negative for dizziness, speech difficulty, light-headedness and headaches. Psychiatric/Behavioral:  Negative for agitation, confusion, self-injury and suicidal ideas. The patient is not nervous/anxious. Objective:     Physical Exam  Vitals and nursing note reviewed. Constitutional:       General: She is not in acute distress. Appearance: She is well-developed. She is not diaphoretic. HENT:      Head: Normocephalic and atraumatic. Right Ear: External ear normal.      Left Ear: External ear normal.      Nose: Nose normal.      Mouth/Throat:      Pharynx: No oropharyngeal exudate. Eyes:      General:         Right eye: No discharge. Left eye: No discharge. Conjunctiva/sclera: Conjunctivae normal.      Pupils: Pupils are equal, round, and reactive to light. Cardiovascular:      Rate and Rhythm: Normal rate and regular rhythm. Heart sounds: Normal heart sounds. No murmur heard. Pulmonary:      Effort: Pulmonary effort is normal. No respiratory distress. Breath sounds: Normal breath sounds. No stridor. No wheezing or rales. Chest:      Chest wall: No tenderness. Breasts:     Right: No inverted nipple, mass, nipple discharge, skin change or tenderness. Left: No inverted nipple, mass, nipple discharge, skin change or tenderness. Abdominal:      General: Bowel sounds are normal. There is no distension. Palpations: Abdomen is soft. Tenderness: There is no abdominal tenderness. Musculoskeletal:         General: No deformity. Normal range of motion. Right wrist: Tenderness present. Cervical back: Normal range of motion and neck supple. Skin:     General: Skin is warm and dry. Findings: No erythema or rash. Neurological:      Mental Status: She is alert and oriented to person, place, and time. Cranial Nerves: No cranial nerve deficit. Coordination: Coordination normal.      Deep Tendon Reflexes: Reflexes are normal and symmetric. Psychiatric:         Behavior: Behavior normal.         Thought Content: Thought content normal.         /86 (Site: Left Upper Arm, Position: Sitting, Cuff Size: Small Adult)   Pulse 96   Temp 97.4 °F (36.3 °C) (Temporal)   Ht 5' 4\" (1.626 m)   Wt 139 lb 3.2 oz (63.1 kg)   SpO2 99%   BMI 23.89 kg/m²     Assessment:      Diagnosis Orders   1. Depression, unspecified depression type        2. Arthritis        3. Ganglion cyst of wrist, right        4. Bilateral wrist pain  CHG X-RAY WRIST 3+ VW      5. Allergic rhinitis due to other allergic trigger, unspecified seasonality        6. Eczema, unspecified type        7. Elevated BP without diagnosis of hypertension        8. Thyroid nodule        9. Abnormal thyroid ultrasound        10. Menorrhagia with irregular cycle        11. Gastroesophageal reflux disease without esophagitis          No results found for this visit on 07/26/22. Plan:     1. Depression, unspecified depression type    2. Arthritis    3. Ganglion cyst of wrist, right    4. Bilateral wrist pain    5. Allergic rhinitis due to other allergic trigger, unspecified seasonality    6. Eczema, unspecified type    7. Elevated BP without diagnosis of hypertension    8. Thyroid nodule    9. Abnormal thyroid ultrasound    10. Menorrhagia with irregular cycle    11. Gastroesophageal reflux disease without esophagitis      Over 50% of the total visit time of 30 minutes was spent on counseling and or coordination of care of menorrhagia with   Depression, arthritis, ganglion cyst of the wrist right, bilateral wrist pain, allergic rhinitis, eczema, elevated blood pressure, thyroid nodule, abnormal thyroid ultrasound, menorrhalgia with irregular cycle and GERD. No follow-ups on file. Orders Placed This Encounter   Procedures    CHG X-RAY WRIST 3+ VW     Scheduling Instructions:      Right wrist cyst lateral aspect. No orders of the defined types were placed in this encounter. Patient Instructions   Xray right wrist on the 1st floor.       /family given educational materials - see patient instructions. Discussed use, benefit, and side effects of prescribed medications. All patient/family questions answered and voiced understanding. Instructed to continue current medications, diet and exercise. Pt/family agreed with treatment plan. Follow up as directed and sooner if needed. Patient/ family instructed that is symptoms worsen or persist they are to contact office or report to nearest ER.   They voice understanding and agreement with this plan.   signed by LIBIA Barboza on 7/26/2022 at 1:18 PM CDT    This dictation was generated by voice recognition computer software. Although all attempts are made to edit the dictation for accuracy, there may be errors in the transcription that are not intended.

## 2022-08-10 DIAGNOSIS — J30.89 ALLERGIC RHINITIS DUE TO OTHER ALLERGIC TRIGGER, UNSPECIFIED SEASONALITY: ICD-10-CM

## 2022-08-10 RX ORDER — MONTELUKAST SODIUM 10 MG/1
10 TABLET ORAL NIGHTLY
Qty: 90 TABLET | Refills: 3 | Status: SHIPPED | OUTPATIENT
Start: 2022-08-10 | End: 2022-09-09

## 2022-08-12 DIAGNOSIS — F32.A DEPRESSION, UNSPECIFIED DEPRESSION TYPE: ICD-10-CM

## 2022-08-12 RX ORDER — DESVENLAFAXINE 25 MG/1
TABLET, EXTENDED RELEASE ORAL
Qty: 30 TABLET | Refills: 2 | Status: SHIPPED | OUTPATIENT
Start: 2022-08-12

## 2022-08-12 NOTE — TELEPHONE ENCOUNTER
Suni Welch called to request a refill on her medication.       Last office visit : 7/26/2022   Next office visit : 1/31/2023     Requested Prescriptions     Pending Prescriptions Disp Refills    desvenlafaxine succinate (PRISTIQ) 25 MG TB24 extended release tablet [Pharmacy Med Name: DESVENLAFAXINE SUCCNT ER 25 MG] 30 tablet 2     Sig: TAKE 1 TABLET BY MOUTH EVERY DAY            [employer]

## 2022-08-23 DIAGNOSIS — K21.9 GASTROESOPHAGEAL REFLUX DISEASE WITHOUT ESOPHAGITIS: ICD-10-CM

## 2022-08-23 NOTE — TELEPHONE ENCOUNTER
Andrade Velazquez called to request a refill on her medication.       Last office visit : 7/26/2022   Next office visit : 1/31/2023     Requested Prescriptions     Pending Prescriptions Disp Refills    pantoprazole (PROTONIX) 40 MG tablet [Pharmacy Med Name: PANTOPRAZOLE SOD DR 40 MG TAB] 90 tablet 1     Sig: TAKE 1 TABLET BY MOUTH EVERY DAY            [employer]

## 2022-08-26 RX ORDER — PANTOPRAZOLE SODIUM 40 MG/1
TABLET, DELAYED RELEASE ORAL
Qty: 90 TABLET | Refills: 1 | Status: SHIPPED | OUTPATIENT
Start: 2022-08-26

## 2022-09-14 RX ORDER — TIZANIDINE 4 MG/1
TABLET ORAL
Qty: 90 TABLET | Refills: 0 | Status: SHIPPED | OUTPATIENT
Start: 2022-09-14

## 2022-09-14 NOTE — TELEPHONE ENCOUNTER
Lavonne Arley called to request a refill on her medication.       Last office visit : 7/26/2022   Next office visit : 1/31/2023     Requested Prescriptions     Pending Prescriptions Disp Refills    tiZANidine (Kim Escudero) 4 MG tablet [Pharmacy Med Name: TIZANIDINE HCL 4 MG TABLET] 90 tablet 0     Sig: TAKE 1/2 TO 1 TABLET BY MOUTH AT BEDTIME (DO NOT DRIVE FOR 8 HRS AFTER TAKING)            [employer]

## 2022-10-19 DIAGNOSIS — J30.89 ALLERGIC RHINITIS DUE TO OTHER ALLERGIC TRIGGER, UNSPECIFIED SEASONALITY: ICD-10-CM

## 2022-10-19 NOTE — TELEPHONE ENCOUNTER
Nova Trent called to request a refill on her medication.       Last office visit : 7/26/2022   Next office visit : 1/31/2023     Requested Prescriptions     Pending Prescriptions Disp Refills    [facility] 12 HOUR NASAL DECONGESTANT 120 MG extended release tablet [Pharmacy Med Name: [facility] 12HR DECONGEST 120 MG CPLT] 30 tablet 2     Sig: TAKE 1 TABLET BY MOUTH EVERY 12 HOURS            Deuce Perez

## 2022-10-21 RX ORDER — PSEUDOEPHEDRINE HCL 120 MG
TABLET, EXTENDED RELEASE ORAL
Qty: 30 TABLET | Refills: 2 | Status: SHIPPED | OUTPATIENT
Start: 2022-10-21

## 2022-11-09 DIAGNOSIS — F32.A DEPRESSION, UNSPECIFIED DEPRESSION TYPE: ICD-10-CM

## 2022-11-09 NOTE — TELEPHONE ENCOUNTER
Delmy Medrano called to request a refill on her medication.       Last office visit : 7/26/2022   Next office visit : 1/31/2023     Requested Prescriptions     Pending Prescriptions Disp Refills    desvenlafaxine succinate (PRISTIQ) 25 MG TB24 extended release tablet [Pharmacy Med Name: DESVENLAFAXINE SUCCNT ER 25 MG] 30 tablet 2     Sig: TAKE 1 TABLET BY MOUTH EVERY DAY            Isabel Ramos MA

## 2022-11-11 RX ORDER — DESVENLAFAXINE 25 MG/1
TABLET, EXTENDED RELEASE ORAL
Qty: 30 TABLET | Refills: 2 | Status: SHIPPED | OUTPATIENT
Start: 2022-11-11

## 2022-12-21 ENCOUNTER — TELEPHONE (OUTPATIENT)
Dept: PRIMARY CARE CLINIC | Age: 32
End: 2022-12-21

## 2022-12-21 RX ORDER — TIZANIDINE 4 MG/1
TABLET ORAL
Qty: 90 TABLET | Refills: 0 | OUTPATIENT
Start: 2022-12-21

## 2022-12-21 NOTE — TELEPHONE ENCOUNTER
LM for pt to call back to establish with another provider due to [facility] no longer being in this office.  1/31/22 appt is cancelled

## 2023-01-18 DIAGNOSIS — J30.89 ALLERGIC RHINITIS DUE TO OTHER ALLERGIC TRIGGER, UNSPECIFIED SEASONALITY: ICD-10-CM

## 2023-01-18 RX ORDER — PSEUDOEPHEDRINE HCL 120 MG
TABLET, EXTENDED RELEASE ORAL
Qty: 30 TABLET | Refills: 2 | OUTPATIENT
Start: 2023-01-18

## 2023-01-18 RX ORDER — TIZANIDINE 4 MG/1
TABLET ORAL
Qty: 90 TABLET | Refills: 0 | OUTPATIENT
Start: 2023-01-18

## 2023-01-30 DIAGNOSIS — J30.89 ALLERGIC RHINITIS DUE TO OTHER ALLERGIC TRIGGER, UNSPECIFIED SEASONALITY: ICD-10-CM

## 2023-01-30 DIAGNOSIS — F32.A DEPRESSION, UNSPECIFIED DEPRESSION TYPE: ICD-10-CM

## 2023-01-30 DIAGNOSIS — K21.9 GASTROESOPHAGEAL REFLUX DISEASE WITHOUT ESOPHAGITIS: ICD-10-CM

## 2023-01-30 DIAGNOSIS — Z30.09 BIRTH CONTROL COUNSELING: ICD-10-CM

## 2023-01-30 RX ORDER — MONTELUKAST SODIUM 10 MG/1
10 TABLET ORAL NIGHTLY
Qty: 90 TABLET | Refills: 3 | OUTPATIENT
Start: 2023-01-30 | End: 2023-03-01

## 2023-01-30 RX ORDER — PSEUDOEPHEDRINE HCL 120 MG/1
TABLET, FILM COATED, EXTENDED RELEASE ORAL
Qty: 30 TABLET | Refills: 2 | OUTPATIENT
Start: 2023-01-30

## 2023-01-30 RX ORDER — DESVENLAFAXINE 25 MG/1
TABLET, EXTENDED RELEASE ORAL
Qty: 30 TABLET | Refills: 2 | OUTPATIENT
Start: 2023-01-30

## 2023-01-30 RX ORDER — NORGESTIMATE AND ETHINYL ESTRADIOL 7DAYSX3 LO
KIT ORAL
Qty: 84 TABLET | Refills: 3 | OUTPATIENT
Start: 2023-01-30

## 2023-01-30 RX ORDER — TIZANIDINE 4 MG/1
TABLET ORAL
Qty: 90 TABLET | Refills: 0 | OUTPATIENT
Start: 2023-01-30

## 2023-01-30 RX ORDER — PANTOPRAZOLE SODIUM 40 MG/1
TABLET, DELAYED RELEASE ORAL
Qty: 90 TABLET | Refills: 1 | OUTPATIENT
Start: 2023-01-30

## 2023-02-02 DIAGNOSIS — K21.9 GASTROESOPHAGEAL REFLUX DISEASE WITHOUT ESOPHAGITIS: ICD-10-CM

## 2023-02-02 DIAGNOSIS — J30.89 ALLERGIC RHINITIS DUE TO OTHER ALLERGIC TRIGGER, UNSPECIFIED SEASONALITY: ICD-10-CM

## 2023-02-02 DIAGNOSIS — F32.A DEPRESSION, UNSPECIFIED DEPRESSION TYPE: ICD-10-CM

## 2023-02-02 DIAGNOSIS — Z30.09 BIRTH CONTROL COUNSELING: ICD-10-CM

## 2023-02-03 RX ORDER — MONTELUKAST SODIUM 10 MG/1
10 TABLET ORAL NIGHTLY
Qty: 90 TABLET | Refills: 3 | OUTPATIENT
Start: 2023-02-03 | End: 2023-03-05

## 2023-02-03 RX ORDER — DESVENLAFAXINE 25 MG/1
TABLET, EXTENDED RELEASE ORAL
Qty: 30 TABLET | Refills: 2 | OUTPATIENT
Start: 2023-02-03

## 2023-02-03 RX ORDER — PANTOPRAZOLE SODIUM 40 MG/1
TABLET, DELAYED RELEASE ORAL
Qty: 90 TABLET | Refills: 1 | OUTPATIENT
Start: 2023-02-03

## 2023-02-03 RX ORDER — TIZANIDINE 4 MG/1
TABLET ORAL
Qty: 90 TABLET | Refills: 0 | OUTPATIENT
Start: 2023-02-03

## 2023-02-03 RX ORDER — PSEUDOEPHEDRINE HCL 120 MG/1
TABLET, FILM COATED, EXTENDED RELEASE ORAL
Qty: 30 TABLET | Refills: 2 | OUTPATIENT
Start: 2023-02-03

## 2023-02-03 RX ORDER — PSEUDOEPHEDRINE HCL 120 MG
TABLET, EXTENDED RELEASE ORAL
Qty: 30 TABLET | Refills: 2 | OUTPATIENT
Start: 2023-02-03

## 2023-02-03 RX ORDER — NORGESTIMATE AND ETHINYL ESTRADIOL 7DAYSX3 LO
KIT ORAL
Qty: 84 TABLET | Refills: 3 | OUTPATIENT
Start: 2023-02-03

## 2023-02-04 DIAGNOSIS — J30.89 ALLERGIC RHINITIS DUE TO OTHER ALLERGIC TRIGGER, UNSPECIFIED SEASONALITY: ICD-10-CM

## 2023-02-04 DIAGNOSIS — K21.9 GASTROESOPHAGEAL REFLUX DISEASE WITHOUT ESOPHAGITIS: ICD-10-CM

## 2023-02-04 DIAGNOSIS — F32.A DEPRESSION, UNSPECIFIED DEPRESSION TYPE: ICD-10-CM

## 2023-02-06 RX ORDER — TIZANIDINE 4 MG/1
TABLET ORAL
Qty: 90 TABLET | Refills: 0 | OUTPATIENT
Start: 2023-02-06

## 2023-02-06 RX ORDER — PSEUDOEPHEDRINE HCL 120 MG
TABLET, EXTENDED RELEASE ORAL
Qty: 30 TABLET | Refills: 2 | OUTPATIENT
Start: 2023-02-06

## 2023-02-06 RX ORDER — PANTOPRAZOLE SODIUM 40 MG/1
TABLET, DELAYED RELEASE ORAL
Qty: 90 TABLET | Refills: 1 | OUTPATIENT
Start: 2023-02-06

## 2023-02-06 RX ORDER — DESVENLAFAXINE 25 MG/1
TABLET, EXTENDED RELEASE ORAL
Qty: 30 TABLET | Refills: 2 | OUTPATIENT
Start: 2023-02-06

## 2023-02-09 ENCOUNTER — OFFICE VISIT (OUTPATIENT)
Dept: FAMILY MEDICINE CLINIC | Facility: CLINIC | Age: 33
End: 2023-02-09
Payer: COMMERCIAL

## 2023-02-09 VITALS
TEMPERATURE: 97.4 F | HEIGHT: 64 IN | DIASTOLIC BLOOD PRESSURE: 68 MMHG | HEART RATE: 93 BPM | OXYGEN SATURATION: 99 % | BODY MASS INDEX: 24.34 KG/M2 | WEIGHT: 142.6 LBS | SYSTOLIC BLOOD PRESSURE: 114 MMHG

## 2023-02-09 DIAGNOSIS — Z76.89 ENCOUNTER TO ESTABLISH CARE: ICD-10-CM

## 2023-02-09 DIAGNOSIS — J30.2 SEASONAL ALLERGIC RHINITIS, UNSPECIFIED TRIGGER: ICD-10-CM

## 2023-02-09 DIAGNOSIS — K21.9 GASTROESOPHAGEAL REFLUX DISEASE WITHOUT ESOPHAGITIS: ICD-10-CM

## 2023-02-09 DIAGNOSIS — I73.00 RAYNAUD'S PHENOMENON WITHOUT GANGRENE: Primary | ICD-10-CM

## 2023-02-09 PROCEDURE — 99204 OFFICE O/P NEW MOD 45 MIN: CPT

## 2023-02-09 RX ORDER — MONTELUKAST SODIUM 10 MG/1
TABLET ORAL
COMMUNITY
Start: 2023-02-02 | End: 2023-02-09 | Stop reason: SDUPTHER

## 2023-02-09 RX ORDER — DESVENLAFAXINE 25 MG/1
25 TABLET, EXTENDED RELEASE ORAL DAILY
Qty: 30 TABLET | Refills: 2 | Status: SHIPPED | OUTPATIENT
Start: 2023-02-09

## 2023-02-09 RX ORDER — AMLODIPINE BESYLATE 5 MG/1
5 TABLET ORAL DAILY
Qty: 30 TABLET | Refills: 2 | OUTPATIENT
Start: 2023-02-09 | End: 2023-02-28

## 2023-02-09 RX ORDER — TIZANIDINE 4 MG/1
4 TABLET ORAL NIGHTLY
Qty: 90 TABLET | Refills: 1 | Status: SHIPPED | OUTPATIENT
Start: 2023-02-09

## 2023-02-09 RX ORDER — PANTOPRAZOLE SODIUM 40 MG/1
40 TABLET, DELAYED RELEASE ORAL DAILY
Qty: 90 TABLET | Refills: 1 | Status: SHIPPED | OUTPATIENT
Start: 2023-02-09

## 2023-02-09 RX ORDER — MONTELUKAST SODIUM 10 MG/1
10 TABLET ORAL NIGHTLY
Qty: 90 TABLET | Refills: 1 | Status: SHIPPED | OUTPATIENT
Start: 2023-02-09

## 2023-02-09 RX ORDER — PSEUDOEPHEDRINE HCL 120 MG
120 TABLET, EXTENDED RELEASE ORAL EVERY 12 HOURS SCHEDULED
Qty: 30 TABLET | Refills: 1 | Status: SHIPPED | OUTPATIENT
Start: 2023-02-09

## 2023-02-09 RX ORDER — NORGESTIMATE AND ETHINYL ESTRADIOL
1 KIT DAILY
COMMUNITY
Start: 2023-01-10

## 2023-02-09 RX ORDER — PSEUDOEPHEDRINE HCL 120 MG
1 TABLET, EXTENDED RELEASE ORAL EVERY 12 HOURS SCHEDULED
COMMUNITY
Start: 2022-12-20 | End: 2023-02-09 | Stop reason: SDUPTHER

## 2023-02-09 RX ORDER — DESVENLAFAXINE 25 MG/1
1 TABLET, EXTENDED RELEASE ORAL DAILY
COMMUNITY
Start: 2023-01-09 | End: 2023-02-09 | Stop reason: SDUPTHER

## 2023-02-09 NOTE — PROGRESS NOTES
"Chief Complaint  Establish Care    Subjective        Trinh Castelan presents to [facility]  History of Present Illness  Here to establish care, needs med refills, red/blue hands in cold weather      Objective   Vital Signs:  /68 (BP Location: Right arm, Patient Position: Sitting, Cuff Size: Adult)   Pulse 93   Temp 97.4 °F (36.3 °C) (Temporal)   Ht 162.6 cm (64\")   Wt 64.7 kg (142 lb 9.6 oz)   SpO2 99%   BMI 24.48 kg/m²   Estimated body mass index is 24.48 kg/m² as calculated from the following:    Height as of this encounter: 162.6 cm (64\").    Weight as of this encounter: 64.7 kg (142 lb 9.6 oz).       BMI is within normal parameters. No other follow-up for BMI required.      Physical Exam  Vitals and nursing note reviewed.   Constitutional:       General: She is not in acute distress.     Appearance: Normal appearance. She is not ill-appearing, toxic-appearing or diaphoretic.   HENT:      Head: Normocephalic and atraumatic.      Right Ear: External ear normal.      Left Ear: External ear normal.      Nose: Nose normal.   Eyes:      Extraocular Movements: Extraocular movements intact.      Conjunctiva/sclera: Conjunctivae normal.      Pupils: Pupils are equal, round, and reactive to light.   Cardiovascular:      Rate and Rhythm: Normal rate and regular rhythm.      Pulses: Normal pulses.           Radial pulses are 2+ on the right side and 2+ on the left side.      Heart sounds: Normal heart sounds.   Pulmonary:      Effort: Pulmonary effort is normal.      Breath sounds: Normal breath sounds.   Musculoskeletal:      Cervical back: Normal range of motion.      Right lower leg: No edema.      Left lower leg: No edema.   Skin:     General: Skin is warm and dry.      Capillary Refill: Capillary refill takes less than 2 seconds.      Findings: Erythema present.      Comments: Erythema noted to fingers   Neurological:      Mental Status: She is alert and oriented to person, " place, and time. Mental status is at baseline.      GCS: GCS eye subscore is 4. GCS verbal subscore is 5. GCS motor subscore is 6.      Sensory: Sensation is intact. No sensory deficit.      Gait: Gait is intact. Gait normal.   Psychiatric:         Mood and Affect: Mood normal.         Behavior: Behavior normal.         Thought Content: Thought content normal.         Judgment: Judgment normal.        Result Review :                   Assessment and Plan   Diagnoses and all orders for this visit:    1. Raynaud's phenomenon without gangrene (Primary)  -     amLODIPine (NORVASC) 5 MG tablet; Take 1 tablet by mouth Daily.  Dispense: 30 tablet; Refill: 2    2. Gastroesophageal reflux disease without esophagitis    3. Seasonal allergic rhinitis, unspecified trigger    4. Encounter to establish care    Other orders  -     [facility] 12 Hour Nasal Decongestant 120 MG 12 hr tablet; Take 1 tablet by mouth Every 12 (Twelve) Hours.  Dispense: 30 tablet; Refill: 1  -     Desvenlafaxine Succinate ER 25 MG tablet sustained-release 24 hour; Take 1 tablet by mouth Daily.  Dispense: 30 tablet; Refill: 2  -     montelukast (SINGULAIR) 10 MG tablet; Take 1 tablet by mouth Every Night.  Dispense: 90 tablet; Refill: 1  -     tiZANidine (ZANAFLEX) 4 MG tablet; Take 1 tablet by mouth Every Night.  Dispense: 90 tablet; Refill: 1  -     pantoprazole (PROTONIX) 40 MG EC tablet; Take 1 tablet by mouth Daily.  Dispense: 90 tablet; Refill: 1      Pt is seen today to establish care as she has been a patient of EHSAN King. She is needing refills on her chronic medications today, see above. She is also c/o intermittent erythema and a blue color to her hands, fingers, and toes when exposed to the cold. Based on physical exam and her interview I believe the pt is presenting with signs consistent with Raynaud's. Will trial treatment with Amlodipine 5mg daily, I discussed with the pt that she will likely only need this medication during the  winter months. She is agreeable. Will have her f/u in 3mos for recheck on chronic meds.     Plan:  1.  Start Amlodipine 5mg daily for Raynaud's   2.  Continue all other medications as prescribed  3.  F/u in 3mos         Follow Up   Return in about 3 months (around 5/9/2023).  Patient was given instructions and counseling regarding her condition or for health maintenance advice. Please see specific information pulled into the AVS if appropriate.

## 2023-02-28 ENCOUNTER — TELEPHONE (OUTPATIENT)
Dept: FAMILY MEDICINE CLINIC | Facility: CLINIC | Age: 33
End: 2023-02-28
Payer: COMMERCIAL

## 2023-03-01 ENCOUNTER — OFFICE VISIT (OUTPATIENT)
Dept: FAMILY MEDICINE CLINIC | Facility: CLINIC | Age: 33
End: 2023-03-01
Payer: COMMERCIAL

## 2023-03-01 ENCOUNTER — TELEPHONE (OUTPATIENT)
Dept: FAMILY MEDICINE CLINIC | Facility: CLINIC | Age: 33
End: 2023-03-01

## 2023-03-01 VITALS
BODY MASS INDEX: 24.8 KG/M2 | DIASTOLIC BLOOD PRESSURE: 84 MMHG | SYSTOLIC BLOOD PRESSURE: 120 MMHG | OXYGEN SATURATION: 100 % | HEIGHT: 63 IN | TEMPERATURE: 96.8 F | HEART RATE: 125 BPM | WEIGHT: 140 LBS

## 2023-03-01 DIAGNOSIS — J30.9 ALLERGIC RHINITIS, UNSPECIFIED SEASONALITY, UNSPECIFIED TRIGGER: ICD-10-CM

## 2023-03-01 DIAGNOSIS — R23.2 FLUSHING: ICD-10-CM

## 2023-03-01 DIAGNOSIS — T38.0X5A ADVERSE EFFECT OF CORTICOSTEROIDS, INITIAL ENCOUNTER: Primary | ICD-10-CM

## 2023-03-01 PROCEDURE — 99213 OFFICE O/P EST LOW 20 MIN: CPT | Performed by: NURSE PRACTITIONER

## 2023-03-01 RX ORDER — METHYLPREDNISOLONE 4 MG/1
TABLET ORAL
Qty: 21 TABLET | Refills: 0 | Status: SHIPPED | OUTPATIENT
Start: 2023-03-01

## 2023-03-01 NOTE — PROGRESS NOTES
EHSAN Durán  [facility]   Family Medicine  [address]  [place] [address]  Phone: [phone]  Fax: [phone]         Chief Complaint:  Chief Complaint   Patient presents with   • Allergic Reaction        History:  Trinh Castelan is a 32 y.o. female that is an established patient. She  is here for evaluation of the above complaint.    HPI     Patient reports being evaluated at urgent care for allergic reaction to Norvasc.  She was given Kenalog 60 mg IM yesterday.  Patient here for additional evaluation due to erythema that she noted on her lower legs.  She states that it feels like a sunburn.  Patient denies any shortness of breath, wheezing.        ROS:  Review of Systems   Constitutional: Negative for fatigue, fever and unexpected weight change.   HENT: Negative for congestion, ear pain, rhinorrhea, sinus pressure, sinus pain and voice change.    Eyes: Negative for visual disturbance.   Respiratory: Negative for shortness of breath and wheezing.    Cardiovascular: Negative for chest pain and palpitations.   Gastrointestinal: Negative for abdominal pain, nausea and vomiting.   Genitourinary: Negative for dysuria and flank pain.   Musculoskeletal: Negative for back pain, myalgias and neck pain.   Skin: Positive for rash. Negative for color change.   Neurological: Negative for dizziness, weakness, numbness and headaches.   Psychiatric/Behavioral: Negative for behavioral problems, dysphoric mood, self-injury and sleep disturbance.        reports that she has never smoked. She has never used smokeless tobacco. She reports that she does not drink alcohol and does not use drugs.    Current Outpatient Medications   Medication Instructions   • [facility] 12 Hour Nasal Decongestant 120 mg, Oral, Every 12 Hours Scheduled   • Desvenlafaxine Succinate ER 25 mg, Oral, Daily   • methylPREDNISolone (MEDROL) 4 MG dose pack Take as directed on package instructions.   • montelukast  "(SINGULAIR) 10 mg, Oral, Nightly   • pantoprazole (PROTONIX) 40 mg, Oral, Daily   • tiZANidine (ZANAFLEX) 4 mg, Oral, Nightly   • Tri-Lo-Sprintec 0.18/0.215/0.25 MG-25 MCG per tablet 1 tablet, Oral, Daily       OBJECTIVE:  /84 (BP Location: Left arm, Patient Position: Sitting, Cuff Size: Adult)   Pulse (!) 125   Temp 96.8 °F (36 °C) (Temporal)   Ht 160 cm (62.99\")   Wt 63.5 kg (140 lb)   LMP 02/06/2023 (Approximate)   SpO2 100%   BMI 24.81 kg/m²    Physical Exam  Vitals and nursing note reviewed.   Constitutional:       Appearance: Normal appearance. She is well-developed.   HENT:      Head: Normocephalic and atraumatic.      Right Ear: Tympanic membrane, ear canal and external ear normal.      Left Ear: Tympanic membrane, ear canal and external ear normal.      Nose: Nose normal. No septal deviation, nasal tenderness or congestion.      Mouth/Throat:      Lips: Pink. No lesions.      Mouth: Mucous membranes are moist. No oral lesions.      Dentition: Normal dentition.      Pharynx: Oropharynx is clear. No pharyngeal swelling, oropharyngeal exudate or posterior oropharyngeal erythema.   Eyes:      General: Lids are normal. Vision grossly intact. No scleral icterus.        Right eye: No discharge.         Left eye: No discharge.      Extraocular Movements: Extraocular movements intact.      Conjunctiva/sclera: Conjunctivae normal.      Right eye: Right conjunctiva is not injected.      Left eye: Left conjunctiva is not injected.      Pupils: Pupils are equal, round, and reactive to light.   Neck:      Thyroid: No thyroid mass.      Trachea: Trachea normal.   Cardiovascular:      Rate and Rhythm: Normal rate and regular rhythm.      Heart sounds: Normal heart sounds. No murmur heard.    No gallop.   Pulmonary:      Effort: Pulmonary effort is normal.      Breath sounds: Normal breath sounds and air entry. No wheezing, rhonchi or rales.   Musculoskeletal:         General: No tenderness or deformity. Normal " range of motion.      Cervical back: Full passive range of motion without pain, normal range of motion and neck supple.      Thoracic back: Normal.      Right lower leg: No edema.      Left lower leg: No edema.   Skin:     General: Skin is warm and dry.      Coloration: Skin is not jaundiced.      Findings: Rash (left lower extremity, round, 4cm x 4cm erythematous area. ) present.   Neurological:      Mental Status: She is alert and oriented to person, place, and time.      Sensory: Sensation is intact.      Motor: Motor function is intact.      Coordination: Coordination is intact.      Gait: Gait is intact.      Deep Tendon Reflexes: Reflexes are normal and symmetric.   Psychiatric:         Mood and Affect: Mood and affect normal.         Behavior: Behavior normal.         Judgment: Judgment normal.         BMI is within normal parameters. No other follow-up for BMI required.    Procedures    Assessment/Plan:     Diagnoses and all orders for this visit:    1. Adverse effect of corticosteroids, initial encounter (Primary)    2. Allergic rhinitis, unspecified seasonality, unspecified trigger  -     methylPREDNISolone (MEDROL) 4 MG dose pack; Take as directed on package instructions.  Dispense: 21 tablet; Refill: 0    3. Flushing        Discussion: Erythematous rash to lower leg related to side effect of  steroid.    An After Visit Summary was printed and given to the patient at discharge.  No follow-ups on file.          I spent 20 minutes caring for Trinh on this date of service. This time includes time spent by me in the following activities: preparing for the visit, reviewing tests, obtaining and/or reviewing a separately obtained history, performing a medically appropriate examination and/or evaluation, counseling and educating the patient/family/caregiver, ordering medications, tests, or procedures and documenting information in the medical record     Angle MOSER 3/26/2023   Electronically signed.

## 2023-03-01 NOTE — PATIENT INSTRUCTIONS
Begin medrol if symptoms begin to return.     May also take benadryl in addition to other allergy medications if needed.     Do not take anymore norvasc.     Report to ER if you develop worsening shortness of breath, chest heaviness, or feel that your throat is closing off.

## 2023-03-01 NOTE — TELEPHONE ENCOUNTER
Pt called in to say that she had went to  yesterday and had a Cortizone Shot , things she is having a reaction. Ligia took the call

## 2023-05-11 DIAGNOSIS — Z30.41 ORAL CONTRACEPTIVE USE: Primary | ICD-10-CM

## 2023-05-11 RX ORDER — NORGESTIMATE AND ETHINYL ESTRADIOL
1 KIT DAILY
Qty: 28 TABLET | Refills: 5 | Status: SHIPPED | OUTPATIENT
Start: 2023-05-11

## 2023-05-11 NOTE — TELEPHONE ENCOUNTER
Caller: Trinh Castelan    Relationship: Self    Best call back number: [phone]    Requested Prescriptions:   Requested Prescriptions     Pending Prescriptions Disp Refills   • Tri-Lo-Sprintec 0.18/0.215/0.25 MG-25 MCG per tablet 28 tablet      Sig: Take 1 tablet by mouth Daily.        Pharmacy where request should be sent: [facility] #[id number] - [place], [place] - [address] [phone] [facility] [phone] FX     Last office visit with prescribing clinician: 3/1/2023   Last telemedicine visit with prescribing clinician: 3/1/2023   Next office visit with prescribing clinician: 8/9/2023         Does the patient have less than a 3 day supply:  [x] Yes  [] No    Would you like a call back once the refill request has been completed: [x] Yes [] No      Nirmala Benton Rep   05/11/23 14:30 CDT

## 2023-05-14 ENCOUNTER — E-VISIT (OUTPATIENT)
Dept: FAMILY MEDICINE CLINIC | Facility: TELEHEALTH | Age: 33
End: 2023-05-14
Payer: COMMERCIAL

## 2023-05-14 RX ORDER — DESVENLAFAXINE 25 MG/1
25 TABLET, EXTENDED RELEASE ORAL DAILY
Qty: 14 TABLET | Refills: 0 | Status: SHIPPED | OUTPATIENT
Start: 2023-05-14 | End: 2023-05-15 | Stop reason: SDUPTHER

## 2023-05-14 NOTE — EXTERNAL PATIENT INSTRUCTIONS
Note   Ms. Castelan, I have prescribed a 14 day refill on your medication. For remaining refills go thru your prescribing provider. Would you like a Behavorial Health referral today? Just send me a message if so. Thank you, Rachael Jones EHSAN   Diagnosis   Reaction to Severe Stress   My name is EHSAN Jimenez. I'm a healthcare provider at [facility]. I've reviewed your interview, and I see that you have some common symptoms related to stress. I'm glad you reached out.    When you experience more stress than is expected in reaction to a difficult situation, it can cause problems in your life. Learning how to manage that stress can help you cope and feel and act more like yourself.    Stress management can include counseling, coaching, consultations, or various digital tools. In making the recommendations below, I've considered your symptoms, current situation, medical history, and previous treatments, if any.    Please follow up with your provider in a few weeks. They'll check how you're doing and adjust your treatment plan if necessary.    In addition to your recommended treatment plan, there are things you can do to help yourself feel better. Even small actions and lifestyle changes can make a big difference. Try some of the suggestions in the Other treatment section below.   Orders and referrals   I've included a referral for therapy in your treatment plan. Someone will contact you to schedule an appointment for counseling or therapy.   About your diagnosis   Stress is a normal part of life. But when you experience severe stress, it can be hard to manage and adjust to it. You may have behavioral or emotional reactions and symptoms of depression and anxiety. These can affect your ability to go about your daily life.   Usually, people adjust to stressful life changes within a few months. Reactions to severe stress may last up to 6 months after the stressor has ended. It's a health condition that affects  how you think and function, and can even affect your self-esteem. Sometimes stress can also cause physical symptoms.   Common reactions to severe stress include:    Feeling sad, hopeless, or not enjoying things you used to enjoy    Frequent crying    Worrying or feeling anxious, nervous, jittery, or stressed out    Changes in sleep or eating patterns    Feeling overwhelmed    Difficulty functioning in daily activities    Withdrawing from social supports    Difficulty sleeping or concentrating    Avoiding important things such as going to work or paying bills    Recurrent thoughts of death or suicide   What to expect   Counseling and talk therapy   Counseling or therapy teaches you new coping skills and more adaptive ways of thinking about problems. These tools can help you make positive changes. The benefits of counseling often last long after treatment sessions have stopped.   Most people experiencing severe stress don't need medication, and can be treated with counseling, coaching, or other types of stress management.   When to seek care   If you feel like harming yourself or others, call 911 right away.   The [employer] Suicide and Crisis Lifeline is also available. You can dial [phone]   to call the lifeline. You can also connect with a counselor using their online chat  .   Call us at [phone]   with any sudden or unexpected symptoms.    New or worsening depression symptoms    New or worsening anxiety symptoms    Feeling extremely agitated or restless    Panic attacks    Worsening insomnia    New or worsening irritability    Inappropriate aggression, anger, or violence    Dangerous impulses    Extreme increase in activity or talking    Other unusual changes in behavior, mood, thoughts, or feeling   Other treatment   The tips below may help you feel better while you start your treatment plan:   Self-care    Stress can make self-care hard, but taking action can help you feel better.    Make a list of activities that  "usually improve your mood. When you're feeling down, try doing one of those activities, even for a few minutes.    Be kind to yourself. Don't get down on yourself if you don't reach a goal. Be willing to try again.    Try to eat on a regular schedule. Blood sugar levels can affect mood.   Exercise    Physical exercise has an especially positive effect on relieving stress. If you're able to, try walking 30 minutes a day, 3 to 5 times a week. If that sounds like too much, challenge yourself to start walking for just 10 minutes a day. If walking is not for you, find another activity. Any kind of physical activity helps. The best exercise is the kind you enjoy and will actually do.   Improve your sleep   Getting better sleep is one of the best things you can do to improve your symptoms.    Caffeine, tobacco, and alcohol can cause interrupted sleep. Cutting down or quitting these can improve the quality of your sleep. If you can't quit caffeine completely, try avoiding it later in the day.    Set a regular bedtime, and allow a period of time to \"unwind\" before going to sleep.    Wake up at the same time every day.    Turn off or put away all electronic devices an hour before going to sleep.    Avoid reading, watching TV, or using electronic devices in bed.    As much as possible, keep your bedroom dark, cool, and quiet.    If you're struggling to sleep, don't stay in bed. Get up and go to a quiet spot. Read or do relaxation exercises. Then go back to bed and try again.   Try mindfulness exercises    If your mind races, focus on your body instead. Breathe in slowly through your nose and out through your mouth.    Some people find that meditation helps with stress and mood symptoms. If you want to try meditation but don't know how, mobile apps can get you started.   Use your creativity    When you are experiencing significant stress, you can often spend too much time thinking. Making something with your hands can use your " thoughts in a positive way and bring some relief. It also helps you move from inaction to action. Activities like writing in a journal, gardening, woodworking, cooking, or doing a craft can help focus your mind.   Connect with others    If you can't meet in person, send a short text or email to someone just to keep in touch.    If you use social media, notice how it makes you feel. If certain topics or people have a negative effect on your mood, unfollow them. Limit the time you spend on social media. Active participation can be better than passive scrolling through a feed.    If you're up to it, try volunteering. Or just do something kind for someone. This can lift your mood as well as theirs.   Your provider   Your diagnosis was provided by EHSAN Jimenez, a member of your trusted care team at [facility].   If you have any questions, call us at [phone]  .

## 2023-05-14 NOTE — E-VISIT TREATED
Chief Complaint: Anxiety, Depression, Stress   Patient introduction   Patient is 32-year-old female presenting with mood symptoms. Patient is not sure how long they've been having symptoms.   Patient-submitted comments explaining reason for visit: I ran out of my prescription for pristiq and contacted my doctors office on Thursday and still have not received a response. I am needing my pristiq refilled and can not go a third day without it..   Patient did not request an excuse note.   No recent unusual stress from their home situation, family, personal relationships, work, finances, COVID-19, or current news and events.   Depression screening   PHQ-9. Response options are: Not at all (0), On several days (1), More than half the days (2), or Nearly every day (3).   Over the past 2 weeks, patient has been bothered:    (0) Not at all by having little interest or pleasure in doing things    (0) Not at all by depressed mood    (0) Not at all by sleep disturbance    (0) Not at all by fatigue or lethargy    (0) Not at all by change in appetite    (0) Not at all by feelings of worthlessness or excessive guilt    (0) Not at all by poor concentration    (0) Not at all by observable restlessness or slowness in movement    (0) Not at all by thoughts of hurting themselves or that they would be better off dead   Score: 0. Interpretation: 0 to 4: None to minimal. 5 to 9: Mild depression. 10 to 14: Major Depressive Disorder, Mild. 15 to 19: Major Depressive Disorder, Moderately Severe. 20 to 27: Major Depressive Disorder, Severe.   Anxiety screening   JASON-7. Response options are: Not at all (0), On several days (1), More than half the days (2), or Nearly every day (3)   Over the past 2 weeks, patient has been bothered:    (0) Not at all by feeling nervous, anxious, or on edge    (0) Not at all by not being able to stop or control worrying    (0) Not at all by worrying too much about different things    (0) Not at all by having  trouble relaxing    (0) Not at all by being so restless that it is hard to sit still    (0) Not at all by becoming easily annoyed or irritable    (0) Not at all by feeling afraid, as if something awful might happen   Score: 0. Interpretation: 0 to 4: None to minimal. 5 to 9: Mild anxiety. 10 to 14: Moderate anxiety. 15 to 21: Severe anxiety.   Suicide risk screening   Score: Negative screen (based on PHQ-9 responses above).   Action taken based on risk:    Negative screen: Patient completed interview.    Low risk: Patient completed interview. Follow-up per provider discretion.    Moderate risk: Recommended to call [phone] or [phone] or to go to their nearest ER. Patient given option to continue with the interview if those options are not relevant at this time. Follow-up per provider discretion.    High risk: Interview terminated. Recommended to go to ER or to call [phone] or [phone].   Repetitive thoughts and behaviors screening   DSM-5 Level 1 Cross-Cutting Symptom Measure, Section X. 2 items. Response options are: Not at all (0), Rarely (1), Several days (2), More than half the days (3), or Nearly every day (4)   Over the past 2 weeks, patient has been bothered:    (0) Not at all by unpleasant thoughts, urges, or images that repeatedly enter their mind    (0) Not at all by feeling driven to repeat certain behaviors or mental acts   Score: 0. Interpretation: 0 to 2 (with 0 to 1 on both items): Negative screen. 2 or higher (with 2 or higher on either item): Positive screen.   Jesika/hypomania screening   DSM-5 Level 1 Cross-Cutting Symptom Measure, Section III. 2 items. Response options are: Not at all (0), Rarely (1), Several days (2), More than half the days (3), or Nearly every day (4)   Over the past 2 weeks, patient has been bothered:    (0) Not at all by sleeping less than usual, but still having a lot of energy    (0) Not at all by starting lots more projects than usual or doing more risky things than usual   Score: 0.  Interpretation: 0 to 2 (with 1 on both items): Negative screen. 2 or higher (with 2 or higher on at least 1 item): Positive screen; in-interview follow-up with Johny Self-Rating Jesika (ASRM) Scale.   Psychosis/hallucination screening   DSM-5 Level 1 Cross-Cutting Symptom Measure, Section VII. 2 items. Response options are: Not at all (0), Rarely (1), Several days (2), More than half the days (3), or Nearly every day (4)   Over the past 2 weeks, patient has been bothered:    (0) Not at all by hearing things other people could not hear    (0) Not at all by feeling that someone could hear their thoughts   Score: 0. Interpretation: 0: Negative screen. 1 or higher: Positive screen.   Substance abuse screening   DSM-5 Level 1 Cross-Cutting Symptom Measure, Section XIII. 2 items on use of tobacco, recreational drugs, or prescription medications beyond the amount prescribed or duration of prescription.   Over the past 2 weeks, patient:    (0) Did not use tobacco    (0) Did not use a recreational or prescription drug on their own   Score: 0. Interpretation: 0 is a negative screen. 1 or higher with positive response for prescription/recreational drug abuse leads to follow-up with Level 2 Cross-Cutting Symptom Measure, Section XIII. 1 or higher with positive response for tobacco use leads to tobacco cessation advice in AVS.   Comorbid/Exacerbating conditions   No history of asthma, cancer, chronic pain, congestive heart failure, coronary artery disease, diabetes, epilepsy, hypertension, inflammatory arthritis, kidney disease or history of kindey function problems, lupus, multiple sclerosis, Parkinson disease, thyroid disorder, or viral hepatitis.   Past mental health history   Previous diagnosis of depression. Regarding month and year of first depression diagnosis, patient writes: 2019. Since initial depression diagnosis, patient has not had a period when symptoms resolved and they did not need medication.   Family history of  mental health disorders   First-degree relative(s) with a history of depression, bipolar disorder, suicide, and suicide attempt. Regarding medication taken by first-degree relative(s), patient writes: Pristiq.   Current mental health treatment   Patient is not currently in counseling or therapy. Patient is not currently being seen by a psychiatrist and has not been seen by one in the last 2 years.   Currently taking desvenlafaxine.   As to effectiveness of current treatment:   Patient is satisfied with desvenlafaxine. Patient wants to refill desvenlafaxine.   Previous mental health treatment   Patient has not taken any other medications for any mental health condition in the past.   Current medications   Currently taking Desvenlafaxine Succinate ER 25 MG tablet sustained-release 24 hour, tiZANidine 4 MG tablet, pantoprazole 40 MG EC tablet, montelukast 10 MG tablet, and Tri-Lo-Sprintec 0.18/0.215/0.25 MG-25 MCG per tablet.   Medication allergies   No relevant drug allergies.   Medication contraindications   None.   Assessment   Adjustment disorder, unspecified (reaction to severe stress).   This diagnosis is based on review of patient interview responses and other available clinical information.    PHQ-9 depression screening score: 0. Interpretation: 0 to 4: None to minimal.    JASON-7 generalized anxiety screening score: 0. Interpretation: 0 to 4: None to minimal.   Suicide risk severity screening was negative.   Screening results show low likelihood of aashish/hypomania and psychosis.   Plan   Medications:   No medications prescribed.   Orders:    Referral to behavioral health.   Education:    Condition and causes    Treatment and self-care    Possible medication side effects    When to call provider   ----------   Electronically signed by EHSAN Jimenez on 2023-05-14 at 12:58PM   ----------   Patient Interview Transcript:   Have you ever been diagnosed with any of these mental health conditions? Select all that  apply.    Depression   Not selected:    Generalized anxiety disorder (JASON)    Panic attacks    Post traumatic stress disorder (PTSD)    Obsessive-compulsive disorder (OCD)    Bipolar disorder    Schizophrenia or schizoaffective disorder    A mental health condition not listed here (specify)    None of the above   When were you first diagnosed with depression? Please specify the month and year, or your best estimate, as MM/YYYY.    2019   Since you were first diagnosed with depression, has there been a time when your symptoms went away completely and you didn't need to take medication? Select one.    No   Not selected:    Yes   Are you currently taking medication for any mental health condition? Select one.    Yes   Not selected:    No   Which of these medications are you currently taking for a mental health condition? Select all that apply.    Pristiq (desvenlafaxine)   Not selected:    Atarax or Vistaril (hydroxyzine)    BuSpar (buspirone)    Celexa (citalopram)    Cymbalta or Drizalma Sprinkle (duloxetine)    Effexor or Effexor XR (venlafaxine)    Lexapro (escitalopram)    Paxil, Paxil CR, or Pexeva (paroxetine)    Prozac (fluoxetine)    Remeron (mirtazapine)    Trazodone    Wellbutrin SR, Wellbutrin XL, Forfivo XL, or Aplenzin (bupropion)    Zoloft (sertraline)    Other (specify medication and whether you're satisfied with it)   Have you taken any other medications for this or any other mental health condition in the past? Select one.    No   Not selected:    Yes   I'm satisfied with Pristiq (desvenlafaxine)    Yes   Not selected:    No   I want to refill/restart    Yes   Not selected:    No   Have you recently experienced unusual stress from any of these? Select all that apply.    None of the above   Not selected:    Personal relationships    Home situation    Family    Work    Finances    Something related to COVID-19    Current news and events   Are you currently in counseling or therapy? Select one.    No    Not selected:    Yes   Are you currently being seen by a psychiatrist, or have you been seen by a psychiatrist in the last 2 years? Select one.    No   Not selected:    Yes, currently    Yes, within the last 2 years   Do any of these apply to you? Select all that apply.    None of the above   Not selected:    I'm pregnant    I've given birth in the past 12 months    I'm breastfeeding   Do you have any of these medical conditions? Scroll to see all options. Select all that apply.    None of the above   Not selected:    Asthma    Cancer    Chronic pain    Congestive heart failure    Coronary artery disease (blocked arteries in the heart)    Diabetes    Epilepsy    High blood pressure    Inflammatory arthritis    Kidney disease or history of kidney function problems    Lupus (SLE)    Multiple sclerosis    Parkinson disease    Thyroid disorder    Viral hepatitis   Do any of these apply to your first-degree blood relatives? First-degree blood relatives include parents, siblings, and children who you're related to by birth, not by marriage or adoption. Select all that apply.    Depression    Bipolar disorder     by suicide    Attempted suicide   Not selected:    Generalized anxiety disorder (JASON)    Panic attacks    Post traumatic stress disorder (PTSD)    Obsessive-compulsive disorder (OCD)    Schizophrenia or schizoaffective disorder    Drug or alcohol addiction (substance use disorder)    No, not that I know of   Are any of your first-degree blood relatives taking medications for their condition? Select one.    Yes   Not selected:    No    I'm not sure   Genetics often play a role in how well medications work for mental health conditions. For example, if your sister with depression did well on sertraline (Zoloft), then it's likely that sertraline would work well for you, too. If you know the name of the medication your family member takes for their mental health condition, list it here. If not, click Next.     Pristiq   1. Over the past 2 weeks, how often have you been bothered by: Having little interest or pleasure in doing things Select one.    Not at all   Not selected:    Several days    More than half the days    Nearly every day   2. Over the past 2 weeks, how often have you been bothered by: Feeling down, depressed, or hopeless Select one.    Not at all   Not selected:    Several days    More than half the days    Nearly every day   3. Over the past 2 weeks, how often have you been bothered by: Trouble falling or staying asleep, or sleeping too much Select one.    Not at all   Not selected:    Several days    More than half the days    Nearly every day   4. Over the past 2 weeks, how often have you been bothered by: Feeling tired or having little energy Select one.    Not at all   Not selected:    Several days    More than half the days    Nearly every day   5. Over the past 2 weeks, how often have you been bothered by: Poor appetite or overeating Select one.    Not at all   Not selected:    Several days    More than half the days    Nearly every day   6. Over the past 2 weeks, how often have you been bothered by: Feeling bad about yourself, that you're a failure, or that you've let yourself or friends and family down Select one.    Not at all   Not selected:    Several days    More than half the days    Nearly every day   7. Over the past 2 weeks, how often have you been bothered by: Trouble concentrating on things like watching TV or reading the news Select one.    Not at all   Not selected:    Several days    More than half the days    Nearly every day   8. Over the past 2 weeks, how often have you been bothered by: Moving or speaking so slowly that other people could have noticed OR Being so fidgety or restless that you have been moving around a lot more than usual Select one.    Not at all   Not selected:    Several days    More than half the days    Nearly every day   9. Over the past 2 weeks, how often have  you been bothered by: Thoughts that you'd be better off dead or thoughts of hurting yourself Select one.    Not at all   Not selected:    Several days    More than half the days    Nearly every day   1. Over the past 2 weeks, how often have you been bothered by: Feeling nervous, anxious, or on edge? Select one.    Not at all   Not selected:    Several days    More than half the days    Nearly every day   2. Over the past 2 weeks, how often have you been bothered by: Not being able to stop or control worrying? Select one.    Not at all   Not selected:    Several days    More than half the days    Nearly every day   3. Over the past 2 weeks, how often have you been bothered by: Worrying too much about different things? Select one.    Not at all   Not selected:    Several days    More than half the days    Nearly every day   4. Over the past 2 weeks, how often have you been bothered by: Having trouble relaxing? Select one.    Not at all   Not selected:    Several days    More than half the days    Nearly every day   5. Over the past 2 weeks, how often have you been bothered by: Being so restless that it's hard to sit still? Select one.    Not at all   Not selected:    Several days    More than half the days    Nearly every day   6. Over the past 2 weeks, how often have you been bothered by: Becoming easily annoyed or irritable? Select one.    Not at all   Not selected:    Several days    More than half the days    Nearly every day   7. Over the past 2 weeks, how often have you been bothered by: Feeling afraid, as if something awful might happen? Select one.    Not at all   Not selected:    Several days    More than half the days    Nearly every day   Over the past 2 weeks, how often have you been bothered by: Sleeping less than usual, but still having a lot of energy? Select one.    Not at all   Not selected:    1 to 2 days    Several days    More than half the days    Nearly every day   Over the past 2 weeks, how  "often have you been bothered by: Starting lots more projects than usual or doing more risky things than usual? Select one.    Not at all   Not selected:    1 to 2 days    Several days    More than half the days    Nearly every day   Over the past 2 weeks, how often have you been bothered by: Hearing things other people couldn't hear, such as voices even when no one was around? Select one.    Not at all   Not selected:    1 to 2 days    Several days    More than half the days    Nearly every day   Over the past 2 weeks, how often have you been bothered by: Feeling that someone could hear your thoughts, or that you could hear what another person was thinking? Select one.    Not at all   Not selected:    1 to 2 days    Several days    More than half the days    Nearly every day   Over the past 2 weeks, how often have you been bothered by: Unpleasant thoughts, urges, or images that repeatedly enter your mind? Select one.    Not at all   Not selected:    1 to 2 days    Several days    More than half the days    Nearly every day   Over the past 2 weeks, how often have you been bothered by: Feeling driven to perform certain behaviors or mental acts over and over again? Select one.    Not at all   Not selected:    1 to 2 days    Several days    More than half the days    Nearly every day   In the past year, how often did you have a drink containing alcohol? Select one.    Never   Not selected:    Monthly or less    2 to 4 times per month    2 to 3 times per week    4 or more times per week   Over the past 2 weeks, how often have you: Smoked any cigarettes, smoked a cigar or pipe, or used snuff or chewing tobacco? Select one.    Not at all   Not selected:    1 to 2 days    Several days    More than half the days    Nearly every day   Over the past 2 weeks, how often did you use any of these medicines on your own? \"On your own\" means without a doctor's prescription, or more than prescribed, or longer than prescribed. - " "Prescription painkillers, such as Vicodin - Stimulants, such as Ritalin or Adderall - Sedatives or tranquilizers, such as sleeping pills or Valium - Marijuana - Cocaine or crack - Club drugs, such as Ecstasy - Hallucinogens, such as LSD - Heroin - Inhalants or solvents, such as glue - Methamphetamines, such as speed Select one.    Not at all   Not selected:    1 to 2 days    Several days    More than half the days    Nearly every day   Think about all of the symptoms you've shared with us today. How long have you been feeling this way? Select one.    I'm not sure   Not selected:    More than a year    Less than a year   These last few questions help us make sure your treatment plan is safe for you. Do you have any of these conditions? Select all that apply.    None of these   Not selected:    Uncorrected or persistent electrolyte abnormalities, such as potassium, sodium, calcium or magnesium    QT prolongation    Congenital long QT syndrome (LQTS)    Ventricular arrhythmias, such as ventricular fibrillation or ventricular tachycardia    Bradycardia (low heart rate)    Recent heart attack    Congestive heart failure (CHF)   Do any of these apply to you now or in the recent past? \"Cold turkey\" here means stopping a medication suddenly rather than slowly taking lower and lower doses until you're off the medication. Select all that apply.    None of these   Not selected:    Seizure disorder    Bulimia or anorexia    Liver disease    Alcohol abuse    Stopped using alcohol \"cold turkey\"    Stopped using a sedative \"cold turkey\"    Stopped using an anti-seizure drug \"cold turkey\"    Stopped using a benzodiazepine drug (Klonopin, Valium, Ativan, Xanax) \"cold turkey\"   Do any of the following apply to you? Select all that apply.    None of these   Not selected:    I'm currently taking pimozide    I'm currently taking thioridazine    I've taken an MAO inhibitor in the last 14 days    I've taken linezolid or IV methylene blue " in the last 14 days   Are you still taking these medications listed in your medical record? If you're not taking any of these, click Next. Select all that apply.    Desvenlafaxine Succinate ER 25 MG tablet sustained-release 24 hour    tiZANidine 4 MG tablet    pantoprazole 40 MG EC tablet    montelukast 10 MG tablet    Tri-Lo-Sprintec 0.18/0.215/0.25 MG-25 MCG per tablet   Are you taking any other medications, vitamins, or supplements? Select one.    No   Not selected:    Yes   Have you ever had an allergic or bad reaction to any medication? Select one.    Yes   Not selected:    No   Have you had an allergic or bad reaction to any of these medications? Select all that apply.    None of these   Not selected:    Bupropion (Wellbutrin SR, Wellbutrin XL, Aplenzin, Zyban)    Buspirone (BuSpar)    Hydroxyzine (Atarax, Vistaril), cetirizine (Zyrtec), or levocetirizine (Xyzal)    Mirtazapine (Remeron)    Trazodone   Have you had an allergic or bad reaction to any of these medications? Select all that apply.    None of these   Not selected:    Citalopram (Celexa)    Desvenlafaxine (Pristiq)    Duloxetine (Cymbalta, Drizalma Sprinkle)    Escitalopram (Lexapro)    Fluoxetine (Prozac)    Paroxetine (Paxil, Paxil CR, Pexeva)    Sertraline (Zoloft)    Venlafaxine (Effexor, Effexor XR)   Do you need a doctor's note? A doctor's note confirms that you received care today and states when you can return to school or work. It does not contain information about your diagnosis or treatment plan. Your provider will make the final decision on whether to give you a doctor's note. Doctor's notes CANNOT be backdated. Select one.    No   Not selected:    Today only (1 day)    Today and tomorrow (2 days)    3 days   What is the main reason you're taking this interview today?    I ran out of my prescription for pristiq and contacted my doctors office on Thursday and still have not received a response. I am needing my pristiq refilled and can not  go a third day without it.   ----------   Medical history   The following information was received from the EMR on May 14, 2023.   Allergies:    SULFA ANTIBIOTICS   - Allergy Type: Medication   - Reaction: Rash   - Severity: Mild   - Clinical Status: Active   - Verification Status: Confirmed    ERYTHROMYCIN   - Allergy Type: Medication   - Reaction: Rash   - Severity: Mild   - Clinical Status: Active   - Verification Status: Confirmed    NORVASC [AMLODIPINE]   - Allergy Type: Medication   - Reaction: Itching   - Severity: Mild   - Clinical Status: Active   - Verification Status: Confirmed   Medications:    desvenlafaxine (PRISTIQ) 24 hr tablet   - Route: Oral   - Start Date: February 09, 2023   - End Date: None   - Status: Active    [facility] 12 HOUR NASAL DECONGESTANT 120 MG PO TB12   - Route: Oral   - Start Date: February 09, 2023   - End Date: None   - Status: Active    tiZANidine (ZANAFLEX) tablet   - Route: Oral   - Start Date: February 09, 2023   - End Date: None   - Status: Active    methylPREDNISolone (MEDROL) tablet dose pack (21 ct)   - Route:   - Start Date: March 01, 2023   - End Date: None   - Status: Active    pantoprazole (PROTONIX) EC tablet   - Route: Oral   - Start Date: February 09, 2023   - End Date: None   - Status: Active    montelukast (SINGULAIR) tablet 10 mg   - Route: Oral   - Start Date: February 09, 2023   - End Date: None   - Status: Active    TRI-LO-SPRINTEC 0.18/0.215/0.25 MG-25 MCG PO TABS   - Route: Oral   - Start Date: May 11, 2023   - End Date: None   - Status: Active   Problem list:    Thyroid nodule   - Category: Problem List Item   - Health Status:   - Start Date: December 22, 2016   - End Date: None   - Status: Active    Dermatitis of external ear   - Category: Problem List Item   - Health Status:   - Start Date: December 22, 2016   - End Date: None   - Status: Active    Chronic eczematous otitis externa of both ears   - Category: Problem List Item   - Health Status:   - Start Date:  December 22, 2016   - End Date: None   - Status: Active    Lymphadenopathy   - Category: Problem List Item   - Health Status:   - Start Date: February 14, 2017   - End Date: None   - Status: Active    Pharyngitis   - Category: Problem List Item   - Health Status:   - Start Date: February 14, 2017   - End Date: None   - Status: Active    Allergic rhinitis   - Category: Problem List Item   - Health Status:   - Start Date: March 01, 2023   - End Date: None   - Status: Active    Flushing   - Category: Problem List Item   - Health Status:   - Start Date: March 01, 2023   - End Date: None   - Status: Active    Corticosteroids adverse reaction   - Category: Problem List Item   - Health Status:   - Start Date: March 01, 2023   - End Date: None   - Status: Active

## 2023-05-15 DIAGNOSIS — F32.A DEPRESSION, UNSPECIFIED DEPRESSION TYPE: Primary | ICD-10-CM

## 2023-05-15 RX ORDER — DESVENLAFAXINE 25 MG/1
25 TABLET, EXTENDED RELEASE ORAL DAILY
Qty: 30 TABLET | Refills: 0 | Status: SHIPPED | OUTPATIENT
Start: 2023-05-15

## 2023-05-15 NOTE — TELEPHONE ENCOUNTER
Rx Refill Note  Requested Prescriptions     Pending Prescriptions Disp Refills   • Desvenlafaxine Succinate ER 25 MG tablet sustained-release 24 hour 30 tablet 0     Sig: Take 1 tablet by mouth Daily.      Last office visit with prescribing clinician: 3/1/2023   Last telemedicine visit with prescribing clinician: 5/11/2023   Next office visit with prescribing clinician: 8/9/2023                         Would you like a call back once the refill request has been completed: [] Yes [] No    If the office needs to give you a call back, can they leave a voicemail: [] Yes [] No    Ute Holman MA  05/15/23, 09:08 CDT

## 2023-05-28 ENCOUNTER — OFFICE VISIT (OUTPATIENT)
Age: 33
End: 2023-05-28

## 2023-05-28 VITALS
TEMPERATURE: 97.9 F | SYSTOLIC BLOOD PRESSURE: 136 MMHG | WEIGHT: 143 LBS | OXYGEN SATURATION: 99 % | HEART RATE: 92 BPM | BODY MASS INDEX: 24.55 KG/M2 | RESPIRATION RATE: 18 BRPM | DIASTOLIC BLOOD PRESSURE: 88 MMHG

## 2023-05-28 DIAGNOSIS — J02.9 SORE THROAT: ICD-10-CM

## 2023-05-28 DIAGNOSIS — J01.90 ACUTE NON-RECURRENT SINUSITIS, UNSPECIFIED LOCATION: Primary | ICD-10-CM

## 2023-05-28 LAB — S PYO AG THROAT QL: NORMAL

## 2023-05-28 RX ORDER — AMOXICILLIN AND CLAVULANATE POTASSIUM 875; 125 MG/1; MG/1
1 TABLET, FILM COATED ORAL 2 TIMES DAILY
Qty: 20 TABLET | Refills: 0 | Status: SHIPPED | OUTPATIENT
Start: 2023-05-28 | End: 2023-06-07

## 2023-05-28 RX ORDER — DEXAMETHASONE SODIUM PHOSPHATE 10 MG/ML
5 INJECTION INTRAMUSCULAR; INTRAVENOUS ONCE
Status: COMPLETED | OUTPATIENT
Start: 2023-05-28 | End: 2023-05-28

## 2023-05-28 RX ADMIN — DEXAMETHASONE SODIUM PHOSPHATE 5 MG: 10 INJECTION INTRAMUSCULAR; INTRAVENOUS at 09:35

## 2023-05-28 ASSESSMENT — ENCOUNTER SYMPTOMS
ALLERGIC/IMMUNOLOGIC NEGATIVE: 1
EYES NEGATIVE: 1
WHEEZING: 0
SINUS PRESSURE: 1
SORE THROAT: 1
SHORTNESS OF BREATH: 0
GASTROINTESTINAL NEGATIVE: 1
COUGH: 1
SINUS PAIN: 1

## 2023-07-24 DIAGNOSIS — F32.A DEPRESSION, UNSPECIFIED DEPRESSION TYPE: ICD-10-CM

## 2023-07-24 RX ORDER — DESVENLAFAXINE 25 MG/1
25 TABLET, EXTENDED RELEASE ORAL DAILY
Qty: 30 TABLET | Refills: 0 | Status: SHIPPED | OUTPATIENT
Start: 2023-07-24 | End: 2023-07-25 | Stop reason: SDUPTHER

## 2023-07-25 DIAGNOSIS — F32.A DEPRESSION, UNSPECIFIED DEPRESSION TYPE: ICD-10-CM

## 2023-07-25 RX ORDER — DESVENLAFAXINE 25 MG/1
25 TABLET, EXTENDED RELEASE ORAL DAILY
Qty: 30 TABLET | Refills: 0 | Status: SHIPPED | OUTPATIENT
Start: 2023-07-25

## 2023-08-09 ENCOUNTER — OFFICE VISIT (OUTPATIENT)
Dept: FAMILY MEDICINE CLINIC | Facility: CLINIC | Age: 33
End: 2023-08-09
Payer: COMMERCIAL

## 2023-08-09 VITALS
RESPIRATION RATE: 18 BRPM | OXYGEN SATURATION: 100 % | DIASTOLIC BLOOD PRESSURE: 81 MMHG | BODY MASS INDEX: 26.58 KG/M2 | HEIGHT: 63 IN | SYSTOLIC BLOOD PRESSURE: 120 MMHG | TEMPERATURE: 97.9 F | HEART RATE: 103 BPM | WEIGHT: 150 LBS

## 2023-08-09 DIAGNOSIS — F32.A DEPRESSION, UNSPECIFIED DEPRESSION TYPE: ICD-10-CM

## 2023-08-09 DIAGNOSIS — K62.5 RECTAL BLEEDING: ICD-10-CM

## 2023-08-09 DIAGNOSIS — M62.838 MUSCLE SPASM: ICD-10-CM

## 2023-08-09 DIAGNOSIS — J30.9 ALLERGIC RHINITIS, UNSPECIFIED SEASONALITY, UNSPECIFIED TRIGGER: ICD-10-CM

## 2023-08-09 DIAGNOSIS — K21.9 GASTROESOPHAGEAL REFLUX DISEASE WITHOUT ESOPHAGITIS: ICD-10-CM

## 2023-08-09 DIAGNOSIS — L20.9 ATOPIC DERMATITIS, UNSPECIFIED TYPE: Primary | ICD-10-CM

## 2023-08-09 DIAGNOSIS — Z30.41 ORAL CONTRACEPTIVE USE: ICD-10-CM

## 2023-08-09 RX ORDER — PANTOPRAZOLE SODIUM 40 MG/1
40 TABLET, DELAYED RELEASE ORAL DAILY
Qty: 90 TABLET | Refills: 1 | Status: SHIPPED | OUTPATIENT
Start: 2023-08-09

## 2023-08-09 RX ORDER — TIZANIDINE 4 MG/1
4 TABLET ORAL NIGHTLY
Qty: 90 TABLET | Refills: 1 | Status: SHIPPED | OUTPATIENT
Start: 2023-08-09

## 2023-08-09 RX ORDER — NORGESTIMATE AND ETHINYL ESTRADIOL 7DAYSX3 LO
1 KIT ORAL DAILY
Qty: 28 TABLET | Refills: 12 | Status: SHIPPED | OUTPATIENT
Start: 2023-08-09 | End: 2024-08-08

## 2023-08-09 RX ORDER — PSEUDOEPHEDRINE HCL 120 MG
120 TABLET, EXTENDED RELEASE ORAL EVERY 12 HOURS SCHEDULED
Qty: 30 TABLET | Refills: 5 | Status: SHIPPED | OUTPATIENT
Start: 2023-08-09

## 2023-08-09 RX ORDER — DESVENLAFAXINE 25 MG/1
25 TABLET, EXTENDED RELEASE ORAL DAILY
Qty: 30 TABLET | Refills: 5 | Status: SHIPPED | OUTPATIENT
Start: 2023-08-09

## 2023-08-09 RX ORDER — MONTELUKAST SODIUM 10 MG/1
10 TABLET ORAL NIGHTLY
Qty: 90 TABLET | Refills: 1 | Status: SHIPPED | OUTPATIENT
Start: 2023-08-09

## 2023-08-09 RX ORDER — NORGESTIMATE AND ETHINYL ESTRADIOL
1 KIT DAILY
Qty: 28 TABLET | Refills: 5 | Status: CANCELLED | OUTPATIENT
Start: 2023-08-09

## 2023-08-09 NOTE — PROGRESS NOTES
EHSAN Durán  [facility]   Family Medicine  [address]  [place] [address]  Phone: [phone]  Fax: [phone]         Chief Complaint:  Chief Complaint   Patient presents with    Follow-up     6-month  Medications    Rash        History:  Trinh Castelan is a 32 y.o. female that is an established patient. She  is here for evaluation of the above complaint.    Rash  Associated symptoms include fatigue. Pertinent negatives include no congestion, fever, rhinorrhea, shortness of breath or vomiting.   The patient complains of a rash on her bilateral arms that appeared 1 month ago. She was using Eucrisa cream that was ineffective. Patient previously had a rash on her leg.     She reports episodes of bright red hematochezia, and she thinks she could have hemorrhoids. Patient notes that it sounds like she is urinating but it is blood coming out.     The patient currently takes Pristiq, Singulair at bedtime, Protonix, Zanaflex, and Tri-Lo-Mily 0.18/0.215/0.25 mg/25 mcg. Taking 12 hour Sudafed as needed.        ROS:  Review of Systems   Constitutional:  Positive for fatigue. Negative for fever and unexpected weight change.   HENT:  Negative for congestion, ear pain, rhinorrhea, sinus pressure, sinus pain and voice change.    Eyes:  Negative for visual disturbance.   Respiratory:  Negative for shortness of breath and wheezing.    Cardiovascular:  Negative for chest pain and palpitations.   Gastrointestinal:  Negative for abdominal pain, nausea and vomiting.   Genitourinary:  Negative for dysuria and flank pain.        Rectal bleeding   Musculoskeletal:  Negative for back pain, myalgias and neck pain.   Skin:  Positive for rash. Negative for color change.   Neurological:  Negative for dizziness, weakness, numbness and headaches.   Psychiatric/Behavioral:  Negative for behavioral problems, dysphoric mood, self-injury and sleep disturbance.       reports that she has never  "smoked. She has never used smokeless tobacco. She reports that she does not drink alcohol and does not use drugs.    Current Outpatient Medications   Medication Instructions    [facility] 12 Hour Nasal Decongestant 120 mg, Oral, Every 12 Hours Scheduled    Desvenlafaxine Succinate ER 25 mg, Oral, Daily    montelukast (SINGULAIR) 10 mg, Oral, Nightly    norgestimate-ethinyl estradiol (Tri-Lo-Mily) 0.18/0.215/0.25 MG-25 MCG per tablet 1 tablet, Oral, Daily    pantoprazole (PROTONIX) 40 mg, Oral, Daily    tiZANidine (ZANAFLEX) 4 mg, Oral, Nightly    Tri-Lo-Sprintec 0.18/0.215/0.25 MG-25 MCG per tablet 1 tablet, Oral, Daily       OBJECTIVE:  /81 (BP Location: Right arm, Patient Position: Sitting, Cuff Size: Adult)   Pulse 103   Temp 97.9 øF (36.6 øC) (Infrared)   Resp 18   Ht 160 cm (62.99\")   Wt 68 kg (150 lb)   SpO2 100%   BMI 26.58 kg/mý    Physical Exam  Vitals and nursing note reviewed.   Constitutional:       Appearance: Normal appearance. She is well-developed.   HENT:      Head: Normocephalic and atraumatic.      Right Ear: Tympanic membrane, ear canal and external ear normal.      Left Ear: Tympanic membrane, ear canal and external ear normal.      Nose: Nose normal. No septal deviation, nasal tenderness or congestion.      Mouth/Throat:      Lips: Pink. No lesions.      Mouth: Mucous membranes are moist. No oral lesions.      Dentition: Normal dentition.      Pharynx: Oropharynx is clear. No pharyngeal swelling, oropharyngeal exudate or posterior oropharyngeal erythema.   Eyes:      General: Lids are normal. Vision grossly intact. No scleral icterus.        Right eye: No discharge.         Left eye: No discharge.      Extraocular Movements: Extraocular movements intact.      Conjunctiva/sclera: Conjunctivae normal.      Right eye: Right conjunctiva is not injected.      Left eye: Left conjunctiva is not injected.      Pupils: Pupils are equal, round, and reactive to light.   Neck:      Thyroid: No " thyroid mass.      Trachea: Trachea normal.   Cardiovascular:      Rate and Rhythm: Normal rate and regular rhythm.      Heart sounds: Normal heart sounds. No murmur heard.    No gallop.   Pulmonary:      Effort: Pulmonary effort is normal.      Breath sounds: Normal breath sounds and air entry. No wheezing, rhonchi or rales.   Musculoskeletal:         General: No tenderness or deformity. Normal range of motion.      Cervical back: Full passive range of motion without pain, normal range of motion and neck supple.      Thoracic back: Normal.      Right lower leg: No edema.      Left lower leg: No edema.   Skin:     General: Skin is warm and dry.      Coloration: Skin is not jaundiced.      Findings: Rash (left lower extremity, round, 4cm x 4cm erythematous area. ) present.   Neurological:      Mental Status: She is alert and oriented to person, place, and time.      Sensory: Sensation is intact.      Motor: Motor function is intact.      Coordination: Coordination is intact.      Gait: Gait is intact.      Deep Tendon Reflexes: Reflexes are normal and symmetric.   Psychiatric:         Mood and Affect: Mood and affect normal.         Behavior: Behavior normal.         Judgment: Judgment normal.       BMI is within normal parameters. No other follow-up for BMI required.    Procedures    Assessment/Plan:     Diagnoses and all orders for this visit:    1. Atopic dermatitis, unspecified type (Primary)  -     Discontinue: dexAMETHasone (DECADRON) injection 10 mg  -     dexAMETHasone (DECADRON) injection 10 mg    2. Rectal bleeding  -     Ambulatory Referral to Gastroenterology  -     CBC No Differential; Future    3. Depression, unspecified depression type  -     Desvenlafaxine Succinate ER 25 MG tablet sustained-release 24 hour; Take 1 tablet by mouth Daily.  Dispense: 30 tablet; Refill: 5    4. Oral contraceptive use  -     norgestimate-ethinyl estradiol (Tri-Lo-Mily) 0.18/0.215/0.25 MG-25 MCG per tablet; Take 1 tablet  by mouth Daily.  Dispense: 28 tablet; Refill: 12    5. Allergic rhinitis, unspecified seasonality, unspecified trigger  -     [facility] 12 Hour Nasal Decongestant 120 MG 12 hr tablet; Take 1 tablet by mouth Every 12 (Twelve) Hours.  Dispense: 30 tablet; Refill: 5  -     montelukast (SINGULAIR) 10 MG tablet; Take 1 tablet by mouth Every Night.  Dispense: 90 tablet; Refill: 1    6. Gastroesophageal reflux disease without esophagitis  -     pantoprazole (PROTONIX) 40 MG EC tablet; Take 1 tablet by mouth Daily.  Dispense: 90 tablet; Refill: 1    7. Muscle spasm  -     tiZANidine (ZANAFLEX) 4 MG tablet; Take 1 tablet by mouth Every Night.  Dispense: 90 tablet; Refill: 1        Discussion: Erythematous rash to lower leg related to side effect of  steroid.    An After Visit Summary was printed and given to the patient at discharge.  Return in about 3 months (around 11/9/2023).          I spent 30 minutes caring for Trinh on this date of service. This time includes time spent by me in the following activities: preparing for the visit, reviewing tests, obtaining and/or reviewing a separately obtained history, performing a medically appropriate examination and/or evaluation, counseling and educating the patient/family/caregiver, ordering medications, tests, or procedures and documenting information in the medical record     Angle MOSER 8/12/2023   Electronically signed.

## 2023-08-10 ENCOUNTER — LAB (OUTPATIENT)
Dept: LAB | Facility: HOSPITAL | Age: 33
End: 2023-08-10
Payer: COMMERCIAL

## 2023-08-10 DIAGNOSIS — K62.5 RECTAL BLEEDING: ICD-10-CM

## 2023-08-10 LAB
DEPRECATED RDW RBC AUTO: 41.1 FL (ref 37–54)
ERYTHROCYTE [DISTWIDTH] IN BLOOD BY AUTOMATED COUNT: 12.3 % (ref 12.3–15.4)
HCT VFR BLD AUTO: 45.9 % (ref 34–46.6)
HGB BLD-MCNC: 14.9 G/DL (ref 12–15.9)
MCH RBC QN AUTO: 29.3 PG (ref 26.6–33)
MCHC RBC AUTO-ENTMCNC: 32.5 G/DL (ref 31.5–35.7)
MCV RBC AUTO: 90.2 FL (ref 79–97)
PLATELET # BLD AUTO: 314 10*3/MM3 (ref 140–450)
PMV BLD AUTO: 10.6 FL (ref 6–12)
RBC # BLD AUTO: 5.09 10*6/MM3 (ref 3.77–5.28)
WBC NRBC COR # BLD: 12.75 10*3/MM3 (ref 3.4–10.8)

## 2023-08-10 PROCEDURE — 85027 COMPLETE CBC AUTOMATED: CPT

## 2023-08-10 PROCEDURE — 36415 COLL VENOUS BLD VENIPUNCTURE: CPT

## 2023-08-10 RX ORDER — DEXAMETHASONE SODIUM PHOSPHATE 10 MG/ML
10 INJECTION INTRAMUSCULAR; INTRAVENOUS ONCE
Status: DISCONTINUED | OUTPATIENT
Start: 2023-08-10 | End: 2023-08-10

## 2023-08-10 RX ORDER — DEXAMETHASONE SODIUM PHOSPHATE 10 MG/ML
10 INJECTION INTRAMUSCULAR; INTRAVENOUS ONCE
Status: COMPLETED | OUTPATIENT
Start: 2023-08-10 | End: 2023-08-10

## 2023-08-10 RX ADMIN — DEXAMETHASONE SODIUM PHOSPHATE 10 MG: 10 INJECTION INTRAMUSCULAR; INTRAVENOUS at 09:33

## 2023-08-10 NOTE — PROGRESS NOTES
[facility]   Family Medicine  [address]  [place] [address]  Phone: [phone]  Fax: [phone]         Chief Complaint:  Chief Complaint   Patient presents with    Follow-up     6-month  Medications    Rash        History:  Trinh Castelan is a 32 y.o. female that is an established patient. She  is here for evaluation of the above complaint.    Rash  Pertinent negatives include no congestion, fatigue, fever, rhinorrhea, shortness of breath or vomiting.      The patient complains of a rash on her bilateral arms that appeared 1 month ago. She was using Eucrisa cream that was ineffective. Patient previously had a rash on her leg.     She reports episodes of bright red hematochezia, and she thinks she could have hemorrhoids. Patient notes that it sounds like she is urinating but it is blood coming out.     She recently started taking 12 hour Sudafed as needed.      The patient currently takes Pristiq, Singulair at bedtime, Protonix, Zanaflex, and Tri-Lo-Mily 0.18/0.215/0.25 mg/25 mcg.           Patient reports being evaluated at urgent care for allergic reaction to Norvasc.  She was given Kenalog 60 mg IM yesterday.  Patient here for additional evaluation due to erythema that she noted on her lower legs.  She states that it feels like a sunburn.  Patient denies any shortness of breath, wheezing.          ROS:  Review of Systems   Constitutional:  Negative for fatigue, fever and unexpected weight change.   HENT:  Negative for congestion, ear pain, rhinorrhea, sinus pressure, sinus pain and voice change.    Eyes:  Negative for visual disturbance.   Respiratory:  Negative for shortness of breath and wheezing.    Cardiovascular:  Negative for chest pain and palpitations.   Gastrointestinal:  Positive for blood in stool. Negative for abdominal pain, nausea and vomiting.   Genitourinary:  Negative for dysuria and flank pain.   Musculoskeletal:  Negative for back pain, myalgias  "and neck pain.   Skin:  Positive for rash. Negative for color change.   Neurological:  Negative for dizziness, weakness, numbness and headaches.   Psychiatric/Behavioral:  Negative for behavioral problems, dysphoric mood, self-injury and sleep disturbance.       reports that she has never smoked. She has never used smokeless tobacco. She reports that she does not drink alcohol and does not use drugs.    Current Outpatient Medications   Medication Instructions    [facility] 12 Hour Nasal Decongestant 120 mg, Oral, Every 12 Hours Scheduled    Desvenlafaxine Succinate ER 25 mg, Oral, Daily    montelukast (SINGULAIR) 10 mg, Oral, Nightly    norgestimate-ethinyl estradiol (Tri-Lo-Mily) 0.18/0.215/0.25 MG-25 MCG per tablet 1 tablet, Oral, Daily    pantoprazole (PROTONIX) 40 mg, Oral, Daily    tiZANidine (ZANAFLEX) 4 mg, Oral, Nightly    Tri-Lo-Sprintec 0.18/0.215/0.25 MG-25 MCG per tablet 1 tablet, Oral, Daily       OBJECTIVE:  /81 (BP Location: Right arm, Patient Position: Sitting, Cuff Size: Adult)   Pulse 103   Temp 97.9 øF (36.6 øC) (Infrared)   Resp 18   Ht 160 cm (62.99\")   Wt 68 kg (150 lb)   SpO2 100%   BMI 26.58 kg/mý    Physical Exam  Vitals and nursing note reviewed.   Constitutional:       Appearance: Normal appearance. She is well-developed.   HENT:      Head: Normocephalic and atraumatic.      Right Ear: Tympanic membrane, ear canal and external ear normal.      Left Ear: Tympanic membrane, ear canal and external ear normal.      Nose: Nose normal. No septal deviation, nasal tenderness or congestion.      Mouth/Throat:      Lips: Pink. No lesions.      Mouth: Mucous membranes are moist. No oral lesions.      Dentition: Normal dentition.      Pharynx: Oropharynx is clear. No pharyngeal swelling, oropharyngeal exudate or posterior oropharyngeal erythema.   Eyes:      General: Lids are normal. Vision grossly intact. No scleral icterus.        Right eye: No discharge.         Left eye: No discharge.      " Extraocular Movements: Extraocular movements intact.      Conjunctiva/sclera: Conjunctivae normal.      Right eye: Right conjunctiva is not injected.      Left eye: Left conjunctiva is not injected.      Pupils: Pupils are equal, round, and reactive to light.   Neck:      Thyroid: No thyroid mass.      Trachea: Trachea normal.   Cardiovascular:      Rate and Rhythm: Normal rate and regular rhythm.      Heart sounds: Normal heart sounds. No murmur heard.    No gallop.   Pulmonary:      Effort: Pulmonary effort is normal.      Breath sounds: Normal breath sounds and air entry. No wheezing, rhonchi or rales.   Musculoskeletal:         General: No tenderness or deformity. Normal range of motion.      Cervical back: Full passive range of motion without pain, normal range of motion and neck supple.      Thoracic back: Normal.      Right lower leg: No edema.      Left lower leg: No edema.   Skin:     General: Skin is warm and dry.      Coloration: Skin is not jaundiced.      Findings: Rash (left lower extremity, round, 4cm x 4cm erythematous area. ) present.   Neurological:      Mental Status: She is alert and oriented to person, place, and time.      Sensory: Sensation is intact.      Motor: Motor function is intact.      Coordination: Coordination is intact.      Gait: Gait is intact.      Deep Tendon Reflexes: Reflexes are normal and symmetric.   Psychiatric:         Mood and Affect: Mood and affect normal.         Behavior: Behavior normal.         Judgment: Judgment normal.       BMI is within normal parameters. No other follow-up for BMI required.    Procedures    Assessment/Plan:     Diagnoses and all orders for this visit:    1. Allergic rhinitis, unspecified seasonality, unspecified trigger (Primary)  -     [facility]S 12 Hour Nasal Decongestant 120 MG 12 hr tablet; Take 1 tablet by mouth Every 12 (Twelve) Hours.  Dispense: 30 tablet; Refill: 5  -     montelukast (SINGULAIR) 10 MG tablet; Take 1 tablet by mouth Every  "Night.  Dispense: 90 tablet; Refill: 1    2. Depression, unspecified depression type  -     Desvenlafaxine Succinate ER 25 MG tablet sustained-release 24 hour; Take 1 tablet by mouth Daily.  Dispense: 30 tablet; Refill: 5    3. Oral contraceptive use  -     norgestimate-ethinyl estradiol (Tri-Lo-Mily) 0.18/0.215/0.25 MG-25 MCG per tablet; Take 1 tablet by mouth Daily.  Dispense: 28 tablet; Refill: 12    4. Gastroesophageal reflux disease without esophagitis  -     pantoprazole (PROTONIX) 40 MG EC tablet; Take 1 tablet by mouth Daily.  Dispense: 90 tablet; Refill: 1    5. Rectal bleeding  -     Ambulatory Referral to Gastroenterology  -     CBC No Differential; Future    6. Muscle spasm  -     tiZANidine (ZANAFLEX) 4 MG tablet; Take 1 tablet by mouth Every Night.  Dispense: 90 tablet; Refill: 1        Discussion: Erythematous rash to lower leg related to side effect of  steroid.    An After Visit Summary was printed and given to the patient at discharge.  Return in about 3 months (around 11/9/2023).          I spent 20 minutes caring for Trinh on this date of service. This time includes time spent by me in the following activities: preparing for the visit, reviewing tests, obtaining and/or reviewing a separately obtained history, performing a medically appropriate examination and/or evaluation, counseling and educating the patient/family/caregiver, ordering medications, tests, or procedures and documenting information in the medical record     Angle MOSER 8/9/2023   Electronically signed.    Transcribed from ambient dictation for EHSAN Durán by Jose Hurt.  08/09/23   21:23 CDT    {MILIND Provider Statement:88746::\"Patient or patient representative verbalized consent to the visit recording.\",\"I have personally performed the services described in this document as transcribed by the above individual, and it is both accurate and complete.\"}    "

## 2023-08-12 PROBLEM — M62.838 MUSCLE SPASM: Status: ACTIVE | Noted: 2023-08-12

## 2023-08-12 PROBLEM — K62.5 RECTAL BLEEDING: Status: ACTIVE | Noted: 2023-08-12

## 2023-08-12 PROBLEM — L20.9 ATOPIC DERMATITIS: Status: ACTIVE | Noted: 2023-08-12

## 2023-08-12 PROBLEM — Z30.41 ORAL CONTRACEPTIVE USE: Status: ACTIVE | Noted: 2023-08-12

## 2023-08-12 PROBLEM — K21.9 GASTROESOPHAGEAL REFLUX DISEASE WITHOUT ESOPHAGITIS: Status: ACTIVE | Noted: 2023-08-12

## 2023-08-16 ENCOUNTER — TELEPHONE (OUTPATIENT)
Dept: FAMILY MEDICINE CLINIC | Facility: CLINIC | Age: 33
End: 2023-08-16
Payer: COMMERCIAL

## 2023-08-16 NOTE — TELEPHONE ENCOUNTER
Patient called needing results from her labs CBC done on 8/10/23        Called patient with results of CBC

## 2023-08-21 ENCOUNTER — TELEPHONE (OUTPATIENT)
Dept: FAMILY MEDICINE CLINIC | Facility: CLINIC | Age: 33
End: 2023-08-21
Payer: COMMERCIAL

## 2023-08-21 NOTE — TELEPHONE ENCOUNTER
----- Message from Trinh Castelan sent at 8/21/2023  9:15 AM CDT -----  Regarding: Question regarding CBC  Contact: [phone]  Just wanted to check to see the results were normal or if there was anything else that I needed to do. Thank you

## 2023-08-21 NOTE — TELEPHONE ENCOUNTER
Spoke with patient and informed that Angle will be back in office on 8/22/23 and will update her on Angle's recommendation. Patient voiced understanding.

## 2023-08-23 ENCOUNTER — TELEPHONE (OUTPATIENT)
Dept: FAMILY MEDICINE CLINIC | Facility: CLINIC | Age: 33
End: 2023-08-23
Payer: COMMERCIAL

## 2023-08-23 NOTE — TELEPHONE ENCOUNTER
Caller: Trinh Castelan    Relationship: Self    Best call back number: [phone]     What is the best time to reach you: ANYTIME    Who are you requesting to speak with (clinical staff, provider,  specific staff member): CLINICAL      What was the call regarding: BACK AT START OF AUGUST PATIENT HAD A RASH IN THE BEND OF HER ELBOW AND PANFILO TANNER GAVE HER A STEROID SHOT FOR THIS. THE RASH WENT AWAY BUT NOW HAS RETURNED    Is it okay if the provider responds through MyChart: NO

## 2023-08-24 ENCOUNTER — OFFICE VISIT (OUTPATIENT)
Dept: FAMILY MEDICINE CLINIC | Facility: CLINIC | Age: 33
End: 2023-08-24
Payer: COMMERCIAL

## 2023-08-24 VITALS
TEMPERATURE: 97.2 F | BODY MASS INDEX: 26.4 KG/M2 | SYSTOLIC BLOOD PRESSURE: 120 MMHG | RESPIRATION RATE: 18 BRPM | WEIGHT: 149 LBS | HEIGHT: 63 IN | HEART RATE: 108 BPM | OXYGEN SATURATION: 100 % | DIASTOLIC BLOOD PRESSURE: 70 MMHG

## 2023-08-24 DIAGNOSIS — L25.9 CONTACT DERMATITIS, UNSPECIFIED CONTACT DERMATITIS TYPE, UNSPECIFIED TRIGGER: ICD-10-CM

## 2023-08-24 DIAGNOSIS — L20.82 FLEXURAL ECZEMA: Primary | ICD-10-CM

## 2023-08-24 DIAGNOSIS — E66.3 OVERWEIGHT (BMI 25.0-29.9): ICD-10-CM

## 2023-08-24 RX ORDER — PREDNISONE 10 MG/1
TABLET ORAL
Qty: 1 EACH | Refills: 0 | Status: SHIPPED | OUTPATIENT
Start: 2023-08-24

## 2023-08-24 NOTE — PROGRESS NOTES
EHSAN Durán  [facility]   Family Medicine  [address]  [place] [address]  Phone: [phone]  Fax: [phone]         Chief Complaint:  Chief Complaint   Patient presents with    Rash on arm         History:  Trinh Castelan is a 32 y.o. female that is an established patient. She  is here for evaluation of the above complaint.    HPI     Pt reports that rash to her right antecubital area improved after steroid injection. She states that after steroid injection wore off the rash returned. States that it is itchy and it burns. States that she has tried getting different oven mitts thinking that the mitts washed in new soap. Pt has tried using her eucrisa cream without success.        ROS:  Review of Systems   Constitutional:  Negative for fatigue, fever and unexpected weight change.   HENT:  Negative for congestion, ear pain, rhinorrhea, sinus pressure, sinus pain and voice change.    Eyes:  Negative for visual disturbance.   Respiratory:  Negative for shortness of breath and wheezing.    Cardiovascular:  Negative for chest pain and palpitations.   Gastrointestinal:  Negative for abdominal pain, nausea and vomiting.   Genitourinary:  Negative for dysuria and flank pain.   Musculoskeletal:  Negative for back pain, myalgias and neck pain.   Skin:  Positive for rash. Negative for color change.   Neurological:  Negative for dizziness, weakness, numbness and headaches.   Psychiatric/Behavioral:  Negative for behavioral problems, dysphoric mood, self-injury and sleep disturbance.       reports that she has never smoked. She has never used smokeless tobacco. She reports that she does not drink alcohol and does not use drugs.    Current Outpatient Medications   Medication Instructions    [facility] 12 Hour Nasal Decongestant 120 mg, Oral, Every 12 Hours Scheduled    Desvenlafaxine Succinate ER 25 mg, Oral, Daily    montelukast (SINGULAIR) 10 mg, Oral, Nightly    norgestimate-ethinyl  "estradiol (Tri-Lo-Mily) 0.18/0.215/0.25 MG-25 MCG per tablet 1 tablet, Oral, Daily    pantoprazole (PROTONIX) 40 mg, Oral, Daily    predniSONE (DELTASONE) 10 MG (48) dose pack Take as directed.    tiZANidine (ZANAFLEX) 4 mg, Oral, Nightly    Tri-Lo-Sprintec 0.18/0.215/0.25 MG-25 MCG per tablet 1 tablet, Oral, Daily    triamcinolone (KENALOG) 0.1 % ointment 1 application , Topical, 2 Times Daily       OBJECTIVE:  /70 (BP Location: Right arm, Patient Position: Sitting, Cuff Size: Adult)   Pulse 108   Temp 97.2 øF (36.2 øC) (Infrared)   Resp 18   Ht 160 cm (62.99\")   Wt 67.6 kg (149 lb)   SpO2 100%   BMI 26.40 kg/mý    Physical Exam  Vitals and nursing note reviewed.   Constitutional:       Appearance: Normal appearance. She is well-developed.   HENT:      Head: Normocephalic and atraumatic.      Right Ear: Tympanic membrane, ear canal and external ear normal.      Left Ear: Tympanic membrane, ear canal and external ear normal.      Nose: Nose normal. No septal deviation, nasal tenderness or congestion.      Mouth/Throat:      Lips: Pink. No lesions.      Mouth: Mucous membranes are moist. No oral lesions.      Dentition: Normal dentition.      Pharynx: Oropharynx is clear. No pharyngeal swelling, oropharyngeal exudate or posterior oropharyngeal erythema.   Eyes:      General: Lids are normal. Vision grossly intact. No scleral icterus.        Right eye: No discharge.         Left eye: No discharge.      Extraocular Movements: Extraocular movements intact.      Conjunctiva/sclera: Conjunctivae normal.      Right eye: Right conjunctiva is not injected.      Left eye: Left conjunctiva is not injected.      Pupils: Pupils are equal, round, and reactive to light.   Neck:      Thyroid: No thyroid mass.      Trachea: Trachea normal.   Cardiovascular:      Rate and Rhythm: Normal rate and regular rhythm.      Heart sounds: Normal heart sounds. No murmur heard.    No gallop.   Pulmonary:      Effort: Pulmonary " effort is normal.      Breath sounds: Normal breath sounds and air entry. No wheezing, rhonchi or rales.   Musculoskeletal:         General: No tenderness or deformity. Normal range of motion.      Cervical back: Full passive range of motion without pain, normal range of motion and neck supple.      Thoracic back: Normal.      Right lower leg: No edema.      Left lower leg: No edema.   Skin:     General: Skin is warm and dry.      Coloration: Skin is not jaundiced.      Findings: Rash (to right antecubital area. Small vesicles noted with erythema 3cm x 4cm round patch.) present.   Neurological:      Mental Status: She is alert and oriented to person, place, and time.      Sensory: Sensation is intact.      Motor: Motor function is intact.      Coordination: Coordination is intact.      Gait: Gait is intact.      Deep Tendon Reflexes: Reflexes are normal and symmetric.   Psychiatric:         Mood and Affect: Mood and affect normal.         Behavior: Behavior normal.         Judgment: Judgment normal.       Procedures    Assessment/Plan:     Diagnoses and all orders for this visit:    1. Flexural eczema (Primary)  -     predniSONE (DELTASONE) 10 MG (48) dose pack; Take as directed.  Dispense: 1 each; Refill: 0  -     triamcinolone (KENALOG) 0.1 % ointment; Apply 1 application  topically to the appropriate area as directed 2 (Two) Times a Day for 30 days.  Dispense: 60 g; Refill: 0    2. Contact dermatitis, unspecified contact dermatitis type, unspecified trigger    3. Overweight (BMI 25.0-29.9)      BMI is >= 25 and <30. (Overweight) The following options were offered after discussion;: nutrition counseling/recommendations    An After Visit Summary was printed and given to the patient at discharge.  Return if symptoms worsen or fail to improve.       Patient Instructions   Begin oral steroids.     Begin triamcinolone cream.       Discussion:     I spent 20 minutes caring for Trinh on this date of service. This time  includes time spent by me in the following activities: preparing for the visit, reviewing tests, obtaining and/or reviewing a separately obtained history, performing a medically appropriate examination and/or evaluation, counseling and educating the patient/family/caregiver, ordering medications, tests, or procedures, documenting information in the medical record, independently interpreting results and communicating that information with the patient/family/caregiver, and care coordination     Angle MOSER 8/25/2023   Electronically signed.

## 2023-08-25 PROBLEM — L25.9 CONTACT DERMATITIS: Status: ACTIVE | Noted: 2023-08-25

## 2023-08-25 PROBLEM — E66.3 OVERWEIGHT (BMI 25.0-29.9): Status: ACTIVE | Noted: 2023-08-25

## 2023-08-25 PROBLEM — L20.82 FLEXURAL ECZEMA: Status: ACTIVE | Noted: 2023-08-25

## 2023-09-11 ENCOUNTER — OFFICE VISIT (OUTPATIENT)
Dept: GASTROENTEROLOGY | Facility: CLINIC | Age: 33
End: 2023-09-11
Payer: COMMERCIAL

## 2023-09-11 VITALS
DIASTOLIC BLOOD PRESSURE: 76 MMHG | TEMPERATURE: 97.6 F | HEART RATE: 110 BPM | HEIGHT: 64 IN | OXYGEN SATURATION: 99 % | WEIGHT: 149 LBS | BODY MASS INDEX: 25.44 KG/M2 | SYSTOLIC BLOOD PRESSURE: 120 MMHG

## 2023-09-11 DIAGNOSIS — K62.5 RECTAL BLEEDING: Primary | ICD-10-CM

## 2023-09-11 PROCEDURE — 99214 OFFICE O/P EST MOD 30 MIN: CPT | Performed by: NURSE PRACTITIONER

## 2023-09-11 NOTE — PROGRESS NOTES
Chief Complaint   Patient presents with    Rectal Bleeding     Sometimes when she has bm has bright red bloos       PCP: Angle Velez APRN  REFER: Angle Velez*    Subjective     HPI    Trinh Castelan referred to office for evaluation of bright red blood per rectum occurring on intermittent basis x 1 year.  Blood attributed to hemorrhoids.  Blood described as large in amount and sounding as if she is urinating.  Bowels do not move regular, stool described as flat and bowel movement twice per week.  No abdominal pain. No weight loss.  She does have a pain in her rectum.  No family history of IBD.   One cup coffee in morning, otherwise consumes water.  Denies frequent use of NSAIDs.         Lab Results - Last 18 Months   Lab Units 08/10/23  1433   HEMOGLOBIN g/dL 14.9   HEMATOCRIT % 45.9   MCV fL 90.2   WBC 10*3/mm3 12.75*   PLATELETS 10*3/mm3 314       No results for input(s): GLUCOSE, NA, K, CREATININE, BUN, BCR, ALKPHOS, ALT, AST, BILITOT, ALBUMIN in the last 79822 hours.    No results for input(s): EGFRIFNONA, EGFRIFAFRI, EGFRRESULT in the last 51737 hours.    No results for input(s): IRON, TIBC, LABIRON, FERRITIN, K6YZSXR, TSH, FOLATE in the last 73496 hours.    Invalid input(s): VITB12      Past Medical History:   Diagnosis Date    Allergic     Allergic to sulfa, z pac, and cliyndamycin. Also have seasonal allergies    Anemia     Chronic laryngitis     Depression 2019    GERD (gastroesophageal reflux disease) 2018    Headache     History of medical problems     Benign nodule on thyroid    Laryngopharyngeal reflux (LPR)     Thyroid nodule     Vitamin D deficiency        Past Surgical History:   Procedure Laterality Date    FRACTURE SURGERY  2011    Left arm, metal estevan    ORIF HUMERUS FRACTURE Left 2011    plate    TONSILLECTOMY  2013       Outpatient Medications Marked as Taking for the 9/11/23 encounter (Office Visit) with Álvaro Yancey APRN   Medication Sig Dispense Refill     "[facility] 12 Hour Nasal Decongestant 120 MG 12 hr tablet Take 1 tablet by mouth Every 12 (Twelve) Hours. 30 tablet 5    montelukast (SINGULAIR) 10 MG tablet Take 1 tablet by mouth Every Night. 90 tablet 1    pantoprazole (PROTONIX) 40 MG EC tablet Take 1 tablet by mouth Daily. 90 tablet 1    tiZANidine (ZANAFLEX) 4 MG tablet Take 1 tablet by mouth Every Night. 90 tablet 1    Tri-Lo-Sprintec 0.18/0.215/0.25 MG-25 MCG per tablet Take 1 tablet by mouth Daily. 28 tablet 5    triamcinolone (KENALOG) 0.1 % ointment Apply 1 application  topically to the appropriate area as directed 2 (Two) Times a Day for 30 days. 60 g 0       Allergies   Allergen Reactions    Erythromycin Rash    Norvasc [Amlodipine] Itching    Sulfa Antibiotics Rash       Social History     Socioeconomic History    Marital status: Single   Tobacco Use    Smoking status: Never    Smokeless tobacco: Never   Vaping Use    Vaping Use: Never used   Substance and Sexual Activity    Alcohol use: No    Drug use: No    Sexual activity: Never       Review of Systems   Constitutional:  Negative for fever and unexpected weight change.   HENT:  Negative for trouble swallowing.    Respiratory:  Negative for shortness of breath.    Cardiovascular:  Negative for chest pain.   Gastrointestinal:  Positive for anal bleeding and rectal pain. Negative for abdominal pain.     Objective     Vitals:    09/11/23 1329   BP: 120/76   Pulse: 110   Temp: 97.6 °F (36.4 °C)   SpO2: 99%   Weight: 67.6 kg (149 lb)   Height: 162.6 cm (64\")     Body mass index is 25.58 kg/m².    Physical Exam  Constitutional:       Appearance: Normal appearance. She is well-developed.   Eyes:      General: No scleral icterus.  Cardiovascular:      Heart sounds: Normal heart sounds. No murmur heard.  Pulmonary:      Effort: Pulmonary effort is normal.   Abdominal:      General: Bowel sounds are normal. There is no distension.      Palpations: Abdomen is soft.      Tenderness: There is no abdominal tenderness. " There is no guarding.   Skin:     General: Skin is warm and dry.      Coloration: Skin is not jaundiced.   Neurological:      Mental Status: She is alert.   Psychiatric:         Behavior: Behavior is cooperative.       Imaging Results (Most Recent)       None            Body mass index is 25.58 kg/m².    Assessment & Plan     Diagnoses and all orders for this visit:    1. Rectal bleeding (Primary)  -     Case Request; Standing  -     Case Request    Other orders  -     Implement Anesthesia Orders Day of Procedure; Standing  -     Obtain Informed Consent; Standing         COLONOSCOPY WITH ANESTHESIA (N/A)    Miralax prep     Discussed differentials of fissure, hemorrhoids or other abnormality that cannot be identified without colonoscopy.     Encouraged regular use of miralax and adjusting as needed  After colonoscopy recommend increasing dietary fiber, continue water consumption, continue to use miralax and adjusting as needed to facilitate bowel movement     Use preparation H (lidocaine, witch hazel) if needed    Advised pt to stop use of NSAIDs, Fish Oil, and MV 5 days prior to procedure, per Dr Mckinney protocol.  Tylenol based products are ok to take.  Pt verbalized understanding.    All risks, benefits, alternatives, and indications of colonoscopy procedure have been discussed with the patient. Risks to include perforation of the colon requiring possible surgery or colostomy, risk of bleeding from biopsies or removal of colon tissue, possibility of missing a colon polyp or cancer, or adverse drug reaction.  Benefits to include the diagnosis and management of disease of the colon and rectum. Alternatives to include barium enema, radiographic evaluation, lab testing or no intervention. Pt verbalizes understanding and agrees to proceed with procedure.        Álvaro Yancey, APRN  09/11/23          Patient Instructions   Fiber Content in Foods  Fiber is a substance that is found in plant foods, such as fruits,  vegetables, whole grains, nuts, seeds, and beans. As part of your treatment and recovery plan, your health care provider may recommend that you eat foods that have specific amounts of dietary fiber. Some conditions may require a high-fiber diet while others may require a low-fiber diet.  This sheet gives you information about the dietary fiber content of some common foods. Your health care provider will tell you how much fiber you need in your diet. If you have problems or questions, contact your health care provider or dietitian.  What foods are high in fiber?    Fruits  Blackberries or raspberries (fresh) -- ½ cup (75 g) has 4 g of fiber.  Pear (fresh) -- 1 medium (180 g) has 5.5 g of fiber.  Prunes (dried) -- 6 to 8 pieces (57-76 g) has 5 g of fiber.  Apple with skin -- 1 medium (182 g) has 4.8 g of fiber.  Guava -- 1 cup (128 g) has 8.9 g of fiber.  Vegetables  Peas (frozen) -- ½ cup (80 g) has 4.4 g of fiber.  Potato with skin (baked) -- 1 medium (173 g) has 4.4 g of fiber.  Pumpkin (canned) -- ½ cup (122 g) has 5 g of fiber.  [place] sprouts (cooked) -- ½ cup (78 g) has 4 g of fiber.  Sweet potato -- ½ cup mashed (124 g) has 4 g of fiber.  Winter squash -- 1 cup cooked (205 g) has 5.7 g of fiber.  Grains  Bran cereal -- ½ cup (31 g) has 8.6 g of fiber.  Bulgur (cooked) -- ½ cup (70 g) has 4 g of fiber.  Quinoa (cooked) -- 1 cup (185 g) has 5.2 g of fiber.  Popcorn -- 3 cups (375 g) popped has 5.8 g of fiber.  Spaghetti, whole wheat -- 1 cup (140 g) has 6 g of fiber.  Meats and other proteins  Hernandez beans (cooked) -- ½ cup (90 g) has 7.7 g of fiber.  Lentils (cooked) -- ½ cup (90 g) has 7.8 g of fiber.  Kidney beans (canned) -- ½ cup (92.5 g) has 5.7 g of fiber.  Soybeans (canned, frozen, or fresh) -- ½ cup (92.5 g) has 5.2 g of fiber.  Baked beans, plain or vegetarian (canned) -- ½ cup (130 g) has 5.2 g of fiber.  Garbanzo beans or chickpeas (canned) -- ½ cup (90 g) has 6.6 g of fiber.  Black beans (cooked)  -- ½ cup (86 g) has 7.5 g of fiber.  White beans or navy beans (cooked) -- ½ cup (91 g) has 9.3 g of fiber.  The items listed above may not be a complete list of foods with high fiber. Actual amounts of fiber may be different depending on processing. Contact a dietitian for more information.  What foods are moderate in fiber?    Fruits  Banana -- 1 medium (126 g) has 3.2 g of fiber.  Melon -- 1 cup (155 g) has 1.4 g of fiber.  Orange -- 1 small (154 g) has 3.7 g of fiber.  Raisins -- ¼ cup (40 g) has 1.8 g of fiber.  Applesauce, sweetened -- ½ cup (125 g) has 1.5 g of fiber.  Blueberries (fresh) -- ½ cup (75 g) has 1.8 g of fiber.  Strawberries (fresh, sliced) -- 1 cup (150 g) has 3 g of fiber.  Cherries -- 1 cup (140 g) has 2.9 g of fiber.  Vegetables  Broccoli (cooked) -- ½ cup (77.5 g) has 2.1 g of fiber.  Carrots (cooked) -- ½ cup (77.5 g) has 2.2 g of fiber.  Corn (canned or frozen) -- ½ cup (82.5 g) has 2.1 g of fiber.  Potatoes, mashed -- ½ cup (105 g) has 1.6 g of fiber.  Tomato -- 1 medium (62 g) has 1.5 g of fiber.  Green beans (canned) -- ½ cup (83 g) has 2 g of fiber.  Squash, winter -- ½ cup (58 g) has 1 g of fiber.  Sweet potato, baked -- 1 medium (150 g) has 3 g of fiber.  Cauliflower (cooked) -- 1/2 cup (90 g) has 2.3 g of fiber.  Grains  Long-grain brown rice (cooked) -- 1 cup (196 g) has 3.5 g of fiber.  Bagel, plain -- one 4-inch (10 cm) bagel has 2 g of fiber.  Instant oatmeal -- ½ cup (120 g) has about 2 g of fiber.  Macaroni noodles, enriched (cooked) -- 1 cup (140 g) has 2.5 g of fiber.  Multigrain cereal -- ½ cup (15 g) has about 2-4 g of fiber.  Whole-wheat bread -- 1 slice (26 g) has 2 g of fiber.  Whole-wheat spaghetti noodles -- ½ cup (70 g) has 3.2 g of fiber.  Corn tortilla -- one 6-inch (15 cm) tortilla has 1.5 g of fiber.  Meats and other proteins  Almonds -- ¼ cup or 1 oz (28 g) has 3.5 g of fiber.  Sunflower seeds in shell -- ¼ cup or ½ oz (11.5 g) has 1.1 g of fiber.  Vegetable or  soy saba -- 1 saba (70 g) has 3.4 g of fiber.  Walnuts -- ¼ cup or 1 oz (30 g) has 2 g of fiber.  Flax seed -- 1 Tbsp (7 g) has 2.8 g of fiber.  The items listed above may not be a complete list of foods that have moderate amounts of fiber. Actual amounts of fiber may be different depending on processing. Contact a dietitian for more information.  What foods are low in fiber?    Low-fiber foods contain less than 1 g of fiber per serving. They include:  Fruits  Fruit juice -- ½ cup or 4 fl oz (118 mL) has 0.5 g of fiber.  Vegetables  Lettuce -- 1 cup (35 g) has 0.5 g of fiber.  Cucumber (slices) -- ½ cup (60 g) has 0.3 g of fiber.  Celery -- 1 stalk (40 g) has 0.1 g of fiber.  Grains  Flour tortilla -- one 6-inch (15 cm) tortilla has 0.5 g of fiber.  White rice (cooked) -- ½ cup (81.5 g) has 0.3 g of fiber.  Meats and other proteins  Egg -- 1 large (50 g) has 0 g of fiber.  Meat, poultry, or fish -- 3 oz (85 g) has 0 g of fiber.  Dairy  Milk -- 1 cup or 8 fl oz (237 mL) has 0 g of fiber.  Yogurt -- 1 cup (245 g) has 0 g of fiber.  The items listed above may not be a complete list of foods that are low in fiber. Actual amounts of fiber may be different depending on processing. Contact a dietitian for more information.  Summary  Fiber is a substance that is found in plant foods, such as fruits, vegetables, whole grains, nuts, seeds, and beans.  As part of your treatment and recovery plan, your health care provider may recommend that you eat foods that have specific amounts of dietary fiber.  This information is not intended to replace advice given to you by your health care provider. Make sure you discuss any questions you have with your health care provider.

## 2023-09-11 NOTE — PATIENT INSTRUCTIONS
Fiber Content in Foods  Fiber is a substance that is found in plant foods, such as fruits, vegetables, whole grains, nuts, seeds, and beans. As part of your treatment and recovery plan, your health care provider may recommend that you eat foods that have specific amounts of dietary fiber. Some conditions may require a high-fiber diet while others may require a low-fiber diet.  This sheet gives you information about the dietary fiber content of some common foods. Your health care provider will tell you how much fiber you need in your diet. If you have problems or questions, contact your health care provider or dietitian.  What foods are high in fiber?    Fruits  Blackberries or raspberries (fresh) -- ½ cup (75 g) has 4 g of fiber.  Pear (fresh) -- 1 medium (180 g) has 5.5 g of fiber.  Prunes (dried) -- 6 to 8 pieces (57-76 g) has 5 g of fiber.  Apple with skin -- 1 medium (182 g) has 4.8 g of fiber.  Guava -- 1 cup (128 g) has 8.9 g of fiber.  Vegetables  Peas (frozen) -- ½ cup (80 g) has 4.4 g of fiber.  Potato with skin (baked) -- 1 medium (173 g) has 4.4 g of fiber.  Pumpkin (canned) -- ½ cup (122 g) has 5 g of fiber.  [place] sprouts (cooked) -- ½ cup (78 g) has 4 g of fiber.  Sweet potato -- ½ cup mashed (124 g) has 4 g of fiber.  Winter squash -- 1 cup cooked (205 g) has 5.7 g of fiber.  Grains  Bran cereal -- ½ cup (31 g) has 8.6 g of fiber.  Bulgur (cooked) -- ½ cup (70 g) has 4 g of fiber.  Quinoa (cooked) -- 1 cup (185 g) has 5.2 g of fiber.  Popcorn -- 3 cups (375 g) popped has 5.8 g of fiber.  Spaghetti, whole wheat -- 1 cup (140 g) has 6 g of fiber.  Meats and other proteins  Hernandez beans (cooked) -- ½ cup (90 g) has 7.7 g of fiber.  Lentils (cooked) -- ½ cup (90 g) has 7.8 g of fiber.  Kidney beans (canned) -- ½ cup (92.5 g) has 5.7 g of fiber.  Soybeans (canned, frozen, or fresh) -- ½ cup (92.5 g) has 5.2 g of fiber.  Baked beans, plain or vegetarian (canned) -- ½ cup (130 g) has 5.2 g of  fiber.  Garbanzo beans or chickpeas (canned) -- ½ cup (90 g) has 6.6 g of fiber.  Black beans (cooked) -- ½ cup (86 g) has 7.5 g of fiber.  White beans or navy beans (cooked) -- ½ cup (91 g) has 9.3 g of fiber.  The items listed above may not be a complete list of foods with high fiber. Actual amounts of fiber may be different depending on processing. Contact a dietitian for more information.  What foods are moderate in fiber?    Fruits  Banana -- 1 medium (126 g) has 3.2 g of fiber.  Melon -- 1 cup (155 g) has 1.4 g of fiber.  Orange -- 1 small (154 g) has 3.7 g of fiber.  Raisins -- ¼ cup (40 g) has 1.8 g of fiber.  Applesauce, sweetened -- ½ cup (125 g) has 1.5 g of fiber.  Blueberries (fresh) -- ½ cup (75 g) has 1.8 g of fiber.  Strawberries (fresh, sliced) -- 1 cup (150 g) has 3 g of fiber.  Cherries -- 1 cup (140 g) has 2.9 g of fiber.  Vegetables  Broccoli (cooked) -- ½ cup (77.5 g) has 2.1 g of fiber.  Carrots (cooked) -- ½ cup (77.5 g) has 2.2 g of fiber.  Corn (canned or frozen) -- ½ cup (82.5 g) has 2.1 g of fiber.  Potatoes, mashed -- ½ cup (105 g) has 1.6 g of fiber.  Tomato -- 1 medium (62 g) has 1.5 g of fiber.  Green beans (canned) -- ½ cup (83 g) has 2 g of fiber.  Squash, winter -- ½ cup (58 g) has 1 g of fiber.  Sweet potato, baked -- 1 medium (150 g) has 3 g of fiber.  Cauliflower (cooked) -- 1/2 cup (90 g) has 2.3 g of fiber.  Grains  Long-grain brown rice (cooked) -- 1 cup (196 g) has 3.5 g of fiber.  Bagel, plain -- one 4-inch (10 cm) bagel has 2 g of fiber.  Instant oatmeal -- ½ cup (120 g) has about 2 g of fiber.  Macaroni noodles, enriched (cooked) -- 1 cup (140 g) has 2.5 g of fiber.  Multigrain cereal -- ½ cup (15 g) has about 2-4 g of fiber.  Whole-wheat bread -- 1 slice (26 g) has 2 g of fiber.  Whole-wheat spaghetti noodles -- ½ cup (70 g) has 3.2 g of fiber.  Corn tortilla -- one 6-inch (15 cm) tortilla has 1.5 g of fiber.  Meats and other proteins  Almonds -- ¼ cup or 1 oz (28 g) has  3.5 g of fiber.  Sunflower seeds in shell -- ¼ cup or ½ oz (11.5 g) has 1.1 g of fiber.  Vegetable or soy saba -- 1 saba (70 g) has 3.4 g of fiber.  Walnuts -- ¼ cup or 1 oz (30 g) has 2 g of fiber.  Flax seed -- 1 Tbsp (7 g) has 2.8 g of fiber.  The items listed above may not be a complete list of foods that have moderate amounts of fiber. Actual amounts of fiber may be different depending on processing. Contact a dietitian for more information.  What foods are low in fiber?    Low-fiber foods contain less than 1 g of fiber per serving. They include:  Fruits  Fruit juice -- ½ cup or 4 fl oz (118 mL) has 0.5 g of fiber.  Vegetables  Lettuce -- 1 cup (35 g) has 0.5 g of fiber.  Cucumber (slices) -- ½ cup (60 g) has 0.3 g of fiber.  Celery -- 1 stalk (40 g) has 0.1 g of fiber.  Grains  Flour tortilla -- one 6-inch (15 cm) tortilla has 0.5 g of fiber.  White rice (cooked) -- ½ cup (81.5 g) has 0.3 g of fiber.  Meats and other proteins  Egg -- 1 large (50 g) has 0 g of fiber.  Meat, poultry, or fish -- 3 oz (85 g) has 0 g of fiber.  Dairy  Milk -- 1 cup or 8 fl oz (237 mL) has 0 g of fiber.  Yogurt -- 1 cup (245 g) has 0 g of fiber.  The items listed above may not be a complete list of foods that are low in fiber. Actual amounts of fiber may be different depending on processing. Contact a dietitian for more information.  Summary  Fiber is a substance that is found in plant foods, such as fruits, vegetables, whole grains, nuts, seeds, and beans.  As part of your treatment and recovery plan, your health care provider may recommend that you eat foods that have specific amounts of dietary fiber.  This information is not intended to replace advice given to you by your health care provider. Make sure you discuss any questions you have with your health care provider.

## 2023-09-11 NOTE — H&P (VIEW-ONLY)
Chief Complaint   Patient presents with    Rectal Bleeding     Sometimes when she has bm has bright red bloos       PCP: Angle Velez APRN  REFER: Angle Velez*    Subjective     HPI    Trinh Castelan referred to office for evaluation of bright red blood per rectum occurring on intermittent basis x 1 year.  Blood attributed to hemorrhoids.  Blood described as large in amount and sounding as if she is urinating.  Bowels do not move regular, stool described as flat and bowel movement twice per week.  No abdominal pain. No weight loss.  She does have a pain in her rectum.  No family history of IBD.   One cup coffee in morning, otherwise consumes water.  Denies frequent use of NSAIDs.         Lab Results - Last 18 Months   Lab Units 08/10/23  1433   HEMOGLOBIN g/dL 14.9   HEMATOCRIT % 45.9   MCV fL 90.2   WBC 10*3/mm3 12.75*   PLATELETS 10*3/mm3 314       No results for input(s): GLUCOSE, NA, K, CREATININE, BUN, BCR, ALKPHOS, ALT, AST, BILITOT, ALBUMIN in the last 59384 hours.    No results for input(s): EGFRIFNONA, EGFRIFAFRI, EGFRRESULT in the last 34195 hours.    No results for input(s): IRON, TIBC, LABIRON, FERRITIN, S8PIOVO, TSH, FOLATE in the last 32202 hours.    Invalid input(s): VITB12      Past Medical History:   Diagnosis Date    Allergic     Allergic to sulfa, z pac, and cliyndamycin. Also have seasonal allergies    Anemia     Chronic laryngitis     Depression 2019    GERD (gastroesophageal reflux disease) 2018    Headache     History of medical problems     Benign nodule on thyroid    Laryngopharyngeal reflux (LPR)     Thyroid nodule     Vitamin D deficiency        Past Surgical History:   Procedure Laterality Date    FRACTURE SURGERY  2011    Left arm, metal estevan    ORIF HUMERUS FRACTURE Left 2011    plate    TONSILLECTOMY  2013       Outpatient Medications Marked as Taking for the 9/11/23 encounter (Office Visit) with Álvaro Yancey APRN   Medication Sig Dispense Refill     "[facility] 12 Hour Nasal Decongestant 120 MG 12 hr tablet Take 1 tablet by mouth Every 12 (Twelve) Hours. 30 tablet 5    montelukast (SINGULAIR) 10 MG tablet Take 1 tablet by mouth Every Night. 90 tablet 1    pantoprazole (PROTONIX) 40 MG EC tablet Take 1 tablet by mouth Daily. 90 tablet 1    tiZANidine (ZANAFLEX) 4 MG tablet Take 1 tablet by mouth Every Night. 90 tablet 1    Tri-Lo-Sprintec 0.18/0.215/0.25 MG-25 MCG per tablet Take 1 tablet by mouth Daily. 28 tablet 5    triamcinolone (KENALOG) 0.1 % ointment Apply 1 application  topically to the appropriate area as directed 2 (Two) Times a Day for 30 days. 60 g 0       Allergies   Allergen Reactions    Erythromycin Rash    Norvasc [Amlodipine] Itching    Sulfa Antibiotics Rash       Social History     Socioeconomic History    Marital status: Single   Tobacco Use    Smoking status: Never    Smokeless tobacco: Never   Vaping Use    Vaping Use: Never used   Substance and Sexual Activity    Alcohol use: No    Drug use: No    Sexual activity: Never       Review of Systems   Constitutional:  Negative for fever and unexpected weight change.   HENT:  Negative for trouble swallowing.    Respiratory:  Negative for shortness of breath.    Cardiovascular:  Negative for chest pain.   Gastrointestinal:  Positive for anal bleeding and rectal pain. Negative for abdominal pain.     Objective     Vitals:    09/11/23 1329   BP: 120/76   Pulse: 110   Temp: 97.6 °F (36.4 °C)   SpO2: 99%   Weight: 67.6 kg (149 lb)   Height: 162.6 cm (64\")     Body mass index is 25.58 kg/m².    Physical Exam  Constitutional:       Appearance: Normal appearance. She is well-developed.   Eyes:      General: No scleral icterus.  Cardiovascular:      Heart sounds: Normal heart sounds. No murmur heard.  Pulmonary:      Effort: Pulmonary effort is normal.   Abdominal:      General: Bowel sounds are normal. There is no distension.      Palpations: Abdomen is soft.      Tenderness: There is no abdominal tenderness. " There is no guarding.   Skin:     General: Skin is warm and dry.      Coloration: Skin is not jaundiced.   Neurological:      Mental Status: She is alert.   Psychiatric:         Behavior: Behavior is cooperative.       Imaging Results (Most Recent)       None            Body mass index is 25.58 kg/m².    Assessment & Plan     Diagnoses and all orders for this visit:    1. Rectal bleeding (Primary)  -     Case Request; Standing  -     Case Request    Other orders  -     Implement Anesthesia Orders Day of Procedure; Standing  -     Obtain Informed Consent; Standing         COLONOSCOPY WITH ANESTHESIA (N/A)    Miralax prep     Discussed differentials of fissure, hemorrhoids or other abnormality that cannot be identified without colonoscopy.     Encouraged regular use of miralax and adjusting as needed  After colonoscopy recommend increasing dietary fiber, continue water consumption, continue to use miralax and adjusting as needed to facilitate bowel movement     Use preparation H (lidocaine, witch hazel) if needed    Advised pt to stop use of NSAIDs, Fish Oil, and MV 5 days prior to procedure, per Dr Mckinney protocol.  Tylenol based products are ok to take.  Pt verbalized understanding.    All risks, benefits, alternatives, and indications of colonoscopy procedure have been discussed with the patient. Risks to include perforation of the colon requiring possible surgery or colostomy, risk of bleeding from biopsies or removal of colon tissue, possibility of missing a colon polyp or cancer, or adverse drug reaction.  Benefits to include the diagnosis and management of disease of the colon and rectum. Alternatives to include barium enema, radiographic evaluation, lab testing or no intervention. Pt verbalizes understanding and agrees to proceed with procedure.        Álvaro Yancey, APRN  09/11/23          Patient Instructions   Fiber Content in Foods  Fiber is a substance that is found in plant foods, such as fruits,  vegetables, whole grains, nuts, seeds, and beans. As part of your treatment and recovery plan, your health care provider may recommend that you eat foods that have specific amounts of dietary fiber. Some conditions may require a high-fiber diet while others may require a low-fiber diet.  This sheet gives you information about the dietary fiber content of some common foods. Your health care provider will tell you how much fiber you need in your diet. If you have problems or questions, contact your health care provider or dietitian.  What foods are high in fiber?    Fruits  Blackberries or raspberries (fresh) -- ½ cup (75 g) has 4 g of fiber.  Pear (fresh) -- 1 medium (180 g) has 5.5 g of fiber.  Prunes (dried) -- 6 to 8 pieces (57-76 g) has 5 g of fiber.  Apple with skin -- 1 medium (182 g) has 4.8 g of fiber.  Guava -- 1 cup (128 g) has 8.9 g of fiber.  Vegetables  Peas (frozen) -- ½ cup (80 g) has 4.4 g of fiber.  Potato with skin (baked) -- 1 medium (173 g) has 4.4 g of fiber.  Pumpkin (canned) -- ½ cup (122 g) has 5 g of fiber.  [place]rie sprouts (cooked) -- ½ cup (78 g) has 4 g of fiber.  Sweet potato -- ½ cup mashed (124 g) has 4 g of fiber.  Winter squash -- 1 cup cooked (205 g) has 5.7 g of fiber.  Grains  Bran cereal -- ½ cup (31 g) has 8.6 g of fiber.  Bulgur (cooked) -- ½ cup (70 g) has 4 g of fiber.  Quinoa (cooked) -- 1 cup (185 g) has 5.2 g of fiber.  Popcorn -- 3 cups (375 g) popped has 5.8 g of fiber.  Spaghetti, whole wheat -- 1 cup (140 g) has 6 g of fiber.  Meats and other proteins  Hernandez beans (cooked) -- ½ cup (90 g) has 7.7 g of fiber.  Lentils (cooked) -- ½ cup (90 g) has 7.8 g of fiber.  Kidney beans (canned) -- ½ cup (92.5 g) has 5.7 g of fiber.  Soybeans (canned, frozen, or fresh) -- ½ cup (92.5 g) has 5.2 g of fiber.  Baked beans, plain or vegetarian (canned) -- ½ cup (130 g) has 5.2 g of fiber.  Garbanzo beans or chickpeas (canned) -- ½ cup (90 g) has 6.6 g of fiber.  Black beans (cooked)  -- ½ cup (86 g) has 7.5 g of fiber.  White beans or navy beans (cooked) -- ½ cup (91 g) has 9.3 g of fiber.  The items listed above may not be a complete list of foods with high fiber. Actual amounts of fiber may be different depending on processing. Contact a dietitian for more information.  What foods are moderate in fiber?    Fruits  Banana -- 1 medium (126 g) has 3.2 g of fiber.  Melon -- 1 cup (155 g) has 1.4 g of fiber.  Orange -- 1 small (154 g) has 3.7 g of fiber.  Raisins -- ¼ cup (40 g) has 1.8 g of fiber.  Applesauce, sweetened -- ½ cup (125 g) has 1.5 g of fiber.  Blueberries (fresh) -- ½ cup (75 g) has 1.8 g of fiber.  Strawberries (fresh, sliced) -- 1 cup (150 g) has 3 g of fiber.  Cherries -- 1 cup (140 g) has 2.9 g of fiber.  Vegetables  Broccoli (cooked) -- ½ cup (77.5 g) has 2.1 g of fiber.  Carrots (cooked) -- ½ cup (77.5 g) has 2.2 g of fiber.  Corn (canned or frozen) -- ½ cup (82.5 g) has 2.1 g of fiber.  Potatoes, mashed -- ½ cup (105 g) has 1.6 g of fiber.  Tomato -- 1 medium (62 g) has 1.5 g of fiber.  Green beans (canned) -- ½ cup (83 g) has 2 g of fiber.  Squash, winter -- ½ cup (58 g) has 1 g of fiber.  Sweet potato, baked -- 1 medium (150 g) has 3 g of fiber.  Cauliflower (cooked) -- 1/2 cup (90 g) has 2.3 g of fiber.  Grains  Long-grain brown rice (cooked) -- 1 cup (196 g) has 3.5 g of fiber.  Bagel, plain -- one 4-inch (10 cm) bagel has 2 g of fiber.  Instant oatmeal -- ½ cup (120 g) has about 2 g of fiber.  Macaroni noodles, enriched (cooked) -- 1 cup (140 g) has 2.5 g of fiber.  Multigrain cereal -- ½ cup (15 g) has about 2-4 g of fiber.  Whole-wheat bread -- 1 slice (26 g) has 2 g of fiber.  Whole-wheat spaghetti noodles -- ½ cup (70 g) has 3.2 g of fiber.  Corn tortilla -- one 6-inch (15 cm) tortilla has 1.5 g of fiber.  Meats and other proteins  Almonds -- ¼ cup or 1 oz (28 g) has 3.5 g of fiber.  Sunflower seeds in shell -- ¼ cup or ½ oz (11.5 g) has 1.1 g of fiber.  Vegetable or  soy saba -- 1 saba (70 g) has 3.4 g of fiber.  Walnuts -- ¼ cup or 1 oz (30 g) has 2 g of fiber.  Flax seed -- 1 Tbsp (7 g) has 2.8 g of fiber.  The items listed above may not be a complete list of foods that have moderate amounts of fiber. Actual amounts of fiber may be different depending on processing. Contact a dietitian for more information.  What foods are low in fiber?    Low-fiber foods contain less than 1 g of fiber per serving. They include:  Fruits  Fruit juice -- ½ cup or 4 fl oz (118 mL) has 0.5 g of fiber.  Vegetables  Lettuce -- 1 cup (35 g) has 0.5 g of fiber.  Cucumber (slices) -- ½ cup (60 g) has 0.3 g of fiber.  Celery -- 1 stalk (40 g) has 0.1 g of fiber.  Grains  Flour tortilla -- one 6-inch (15 cm) tortilla has 0.5 g of fiber.  White rice (cooked) -- ½ cup (81.5 g) has 0.3 g of fiber.  Meats and other proteins  Egg -- 1 large (50 g) has 0 g of fiber.  Meat, poultry, or fish -- 3 oz (85 g) has 0 g of fiber.  Dairy  Milk -- 1 cup or 8 fl oz (237 mL) has 0 g of fiber.  Yogurt -- 1 cup (245 g) has 0 g of fiber.  The items listed above may not be a complete list of foods that are low in fiber. Actual amounts of fiber may be different depending on processing. Contact a dietitian for more information.  Summary  Fiber is a substance that is found in plant foods, such as fruits, vegetables, whole grains, nuts, seeds, and beans.  As part of your treatment and recovery plan, your health care provider may recommend that you eat foods that have specific amounts of dietary fiber.  This information is not intended to replace advice given to you by your health care provider. Make sure you discuss any questions you have with your health care provider.

## 2023-10-02 ENCOUNTER — HOSPITAL ENCOUNTER (OUTPATIENT)
Facility: HOSPITAL | Age: 33
Setting detail: HOSPITAL OUTPATIENT SURGERY
Discharge: HOME | End: 2023-10-02
Attending: INTERNAL MEDICINE | Admitting: INTERNAL MEDICINE
Payer: COMMERCIAL

## 2023-10-02 ENCOUNTER — ANESTHESIA EVENT (OUTPATIENT)
Dept: GASTROENTEROLOGY | Facility: HOSPITAL | Age: 33
End: 2023-10-02
Payer: COMMERCIAL

## 2023-10-02 ENCOUNTER — ANESTHESIA (OUTPATIENT)
Dept: GASTROENTEROLOGY | Facility: HOSPITAL | Age: 33
End: 2023-10-02
Payer: COMMERCIAL

## 2023-10-02 VITALS
DIASTOLIC BLOOD PRESSURE: 78 MMHG | RESPIRATION RATE: 14 BRPM | BODY MASS INDEX: 24.24 KG/M2 | HEART RATE: 82 BPM | SYSTOLIC BLOOD PRESSURE: 114 MMHG | TEMPERATURE: 97 F | OXYGEN SATURATION: 100 % | WEIGHT: 142 LBS | HEIGHT: 64 IN

## 2023-10-02 LAB — B-HCG UR QL: NEGATIVE

## 2023-10-02 PROCEDURE — 25010000002 PROPOFOL 10 MG/ML EMULSION: Performed by: NURSE ANESTHETIST, CERTIFIED REGISTERED

## 2023-10-02 PROCEDURE — 81025 URINE PREGNANCY TEST: CPT | Performed by: ANESTHESIOLOGY

## 2023-10-02 RX ORDER — FEXOFENADINE HCL 60 MG/1
60 TABLET, FILM COATED ORAL DAILY
COMMUNITY

## 2023-10-02 RX ORDER — SODIUM CHLORIDE 9 MG/ML
500 INJECTION, SOLUTION INTRAVENOUS CONTINUOUS PRN
Status: DISCONTINUED | OUTPATIENT
Start: 2023-10-02 | End: 2023-10-02 | Stop reason: HOSPADM

## 2023-10-02 RX ORDER — SODIUM CHLORIDE 0.9 % (FLUSH) 0.9 %
10 SYRINGE (ML) INJECTION AS NEEDED
Status: DISCONTINUED | OUTPATIENT
Start: 2023-10-02 | End: 2023-10-02 | Stop reason: HOSPADM

## 2023-10-02 RX ORDER — LIDOCAINE HYDROCHLORIDE 10 MG/ML
0.5 INJECTION, SOLUTION EPIDURAL; INFILTRATION; INTRACAUDAL; PERINEURAL ONCE AS NEEDED
Status: DISCONTINUED | OUTPATIENT
Start: 2023-10-02 | End: 2023-10-02 | Stop reason: HOSPADM

## 2023-10-02 RX ORDER — PROPOFOL 10 MG/ML
VIAL (ML) INTRAVENOUS AS NEEDED
Status: DISCONTINUED | OUTPATIENT
Start: 2023-10-02 | End: 2023-10-02 | Stop reason: SURG

## 2023-10-02 RX ORDER — LIDOCAINE HYDROCHLORIDE 20 MG/ML
INJECTION, SOLUTION EPIDURAL; INFILTRATION; INTRACAUDAL; PERINEURAL AS NEEDED
Status: DISCONTINUED | OUTPATIENT
Start: 2023-10-02 | End: 2023-10-02 | Stop reason: SURG

## 2023-10-02 RX ADMIN — PROPOFOL INJECTABLE EMULSION 50 MG: 10 INJECTION, EMULSION INTRAVENOUS at 10:55

## 2023-10-02 RX ADMIN — PROPOFOL INJECTABLE EMULSION 50 MG: 10 INJECTION, EMULSION INTRAVENOUS at 10:54

## 2023-10-02 RX ADMIN — LIDOCAINE HYDROCHLORIDE 100 MG: 20 INJECTION, SOLUTION EPIDURAL; INFILTRATION; INTRACAUDAL; PERINEURAL at 10:51

## 2023-10-02 RX ADMIN — PROPOFOL INJECTABLE EMULSION 50 MG: 10 INJECTION, EMULSION INTRAVENOUS at 10:52

## 2023-10-02 RX ADMIN — SODIUM CHLORIDE 500 ML: 9 INJECTION, SOLUTION INTRAVENOUS at 10:37

## 2023-10-02 RX ADMIN — PROPOFOL INJECTABLE EMULSION 100 MG: 10 INJECTION, EMULSION INTRAVENOUS at 10:51

## 2023-10-02 RX ADMIN — PROPOFOL INJECTABLE EMULSION 50 MG: 10 INJECTION, EMULSION INTRAVENOUS at 10:53

## 2023-10-02 NOTE — ANESTHESIA PREPROCEDURE EVALUATION
Anesthesia Evaluation     Patient summary reviewed and Nursing notes reviewed   no history of anesthetic complications:   NPO Solid Status: > 8 hours  NPO Liquid Status: > 2 hours           Airway   Mallampati: I  TM distance: >3 FB  Neck ROM: full  No difficulty expected  Dental      Pulmonary    (-) not a smoker  Cardiovascular     (-) hypertension      Neuro/Psych  (+) headaches  (-) seizures, TIA, CVA  GI/Hepatic/Renal/Endo    (+) GERD, thyroid problem thyroid nodules  (-) liver disease, no renal disease, diabetes    Musculoskeletal         ROS comment: Muscle jerks while sleeping, takes muscle relaxant  Abdominal    Substance History      OB/GYN          Other                      Anesthesia Plan    ASA 2     MAC     intravenous induction     Anesthetic plan, risks, benefits, and alternatives have been provided, discussed and informed consent has been obtained with: patient.    CODE STATUS:

## 2023-10-02 NOTE — ANESTHESIA POSTPROCEDURE EVALUATION
"Patient: Trinh Castelan    Procedure Summary       Date: 10/02/23 Room / Location: [facility] ENDOSCOPY 4 / [facility] ENDOSCOPY    Anesthesia Start: 1043 Anesthesia Stop: 1103    Procedure: COLONOSCOPY WITH ANESTHESIA Diagnosis:       Rectal bleeding      (Rectal bleeding [K62.5])    Surgeons: Jakub Mckinney DO Provider: Jose Quintana CRNA    Anesthesia Type: MAC ASA Status: 2            Anesthesia Type: MAC    Vitals  Vitals Value Taken Time   BP     Temp     Pulse 87 10/02/23 1103   Resp     SpO2 98 % 10/02/23 1103   Vitals shown include unvalidated device data.        Post Anesthesia Care and Evaluation    Patient location during evaluation: PHASE II  Patient participation: complete - patient participated  Level of consciousness: awake  Pain score: 0  Pain management: adequate    Airway patency: patent  Anesthetic complications: No anesthetic complications  PONV Status: none  Cardiovascular status: acceptable  Respiratory status: acceptable  Hydration status: acceptable    Comments: Blood pressure 103/69, pulse 87, temperature 97 °F (36.1 °C), temperature source Temporal, resp. rate 13, height 162.6 cm (64\"), weight 64.4 kg (142 lb), last menstrual period 09/29/2023, SpO2 98 %.      "

## 2023-11-09 ENCOUNTER — OFFICE VISIT (OUTPATIENT)
Dept: FAMILY MEDICINE CLINIC | Facility: CLINIC | Age: 33
End: 2023-11-09
Payer: COMMERCIAL

## 2023-11-09 VITALS
WEIGHT: 149 LBS | BODY MASS INDEX: 25.44 KG/M2 | HEIGHT: 64 IN | SYSTOLIC BLOOD PRESSURE: 110 MMHG | HEART RATE: 66 BPM | RESPIRATION RATE: 18 BRPM | OXYGEN SATURATION: 93 % | TEMPERATURE: 96 F | DIASTOLIC BLOOD PRESSURE: 70 MMHG

## 2023-11-09 DIAGNOSIS — L20.82 FLEXURAL ECZEMA: Primary | ICD-10-CM

## 2023-11-09 DIAGNOSIS — L20.9 ATOPIC DERMATITIS, UNSPECIFIED TYPE: ICD-10-CM

## 2023-11-09 RX ORDER — DEXAMETHASONE SODIUM PHOSPHATE 10 MG/ML
10 INJECTION INTRAMUSCULAR; INTRAVENOUS ONCE
Status: COMPLETED | OUTPATIENT
Start: 2023-11-09 | End: 2023-11-09

## 2023-11-09 RX ORDER — PREDNISONE 10 MG/1
TABLET ORAL
Qty: 1 EACH | Refills: 0 | Status: SHIPPED | OUTPATIENT
Start: 2023-11-09

## 2023-11-09 RX ADMIN — DEXAMETHASONE SODIUM PHOSPHATE 10 MG: 10 INJECTION INTRAMUSCULAR; INTRAVENOUS at 16:08

## 2023-11-09 NOTE — PROGRESS NOTES
Angle Velez APRYI  [facility]   Family Medicine  [address]  [place] [address]  Phone: [phone]  Fax: [phone]         Chief Complaint:  Chief Complaint   Patient presents with    Follow-up     3-MONTH        History:  Trinh Castelan is a 32 y.o. female that is an established patient. She  is here for evaluation of the above complaint.    HPI   The patient presents today for a 3-month follow-up. She was previously seen on 08/24/2023 for flexural eczema.    Flexural eczema  The patient states that the eczema is still present She has been applying triamcinolone cream to her arm, which improves the rash, but it reappears. She has new affected areas on her neck and behind her ears. She mentions that after applying the Eucrisa cream, she wakes up with her ear stuck to her head. She notes pain. She was prescribed oral steroids in the past. She is not drinking milk.    Allergies  The patient is currently taking Singular. She is no longer taking Allegra.       ROS:  Review of Systems   Constitutional:  Negative for fatigue, fever and unexpected weight change.   HENT:  Negative for congestion, ear pain, rhinorrhea, sinus pressure, sinus pain and voice change.    Eyes:  Negative for visual disturbance.   Respiratory:  Negative for shortness of breath and wheezing.    Cardiovascular:  Negative for chest pain and palpitations.   Gastrointestinal:  Negative for abdominal pain, nausea and vomiting.   Genitourinary:  Negative for dysuria and flank pain.   Musculoskeletal:  Negative for back pain, myalgias and neck pain.   Skin:  Positive for rash. Negative for color change.   Neurological:  Negative for dizziness, weakness, numbness and headaches.   Psychiatric/Behavioral:  Negative for behavioral problems, dysphoric mood, self-injury and sleep disturbance.         reports that she has never smoked. She has never used smokeless tobacco. She reports that she does not drink alcohol and  "does not use drugs.    Current Outpatient Medications   Medication Instructions    betamethasone valerate (VALISONE) 0.1 % ointment 1 application , Topical, 2 Times Daily    [facility] 12 Hour Nasal Decongestant 120 mg, Oral, Every 12 Hours Scheduled    Desvenlafaxine Succinate ER 25 mg, Oral, Daily    fexofenadine (ALLEGRA) 60 mg, Oral, Daily    montelukast (SINGULAIR) 10 mg, Oral, Nightly    norgestimate-ethinyl estradiol (Tri-Lo-Mily) 0.18/0.215/0.25 MG-25 MCG per tablet 1 tablet, Oral, Daily    pantoprazole (PROTONIX) 40 mg, Oral, Daily    predniSONE (DELTASONE) 10 MG (48) dose pack Take as directed.    tiZANidine (ZANAFLEX) 4 mg, Oral, Nightly       OBJECTIVE:  /70 (BP Location: Left arm, Patient Position: Sitting, Cuff Size: Adult)   Pulse 66   Temp 96 °F (35.6 °C) (Infrared)   Resp 18   Ht 162.6 cm (64.02\")   Wt 67.6 kg (149 lb)   SpO2 93%   BMI 25.56 kg/m²    Physical Exam  Vitals and nursing note reviewed.   Constitutional:       Appearance: Normal appearance. She is well-developed.   HENT:      Head: Normocephalic and atraumatic.      Right Ear: Tympanic membrane, ear canal and external ear normal.      Left Ear: Tympanic membrane, ear canal and external ear normal.      Nose: Nose normal. No septal deviation, nasal tenderness or congestion.      Mouth/Throat:      Lips: Pink. No lesions.      Mouth: Mucous membranes are moist. No oral lesions.      Dentition: Normal dentition.      Pharynx: Oropharynx is clear. No pharyngeal swelling, oropharyngeal exudate or posterior oropharyngeal erythema.   Eyes:      General: Lids are normal. Vision grossly intact. No scleral icterus.        Right eye: No discharge.         Left eye: No discharge.      Extraocular Movements: Extraocular movements intact.      Conjunctiva/sclera: Conjunctivae normal.      Right eye: Right conjunctiva is not injected.      Left eye: Left conjunctiva is not injected.      Pupils: Pupils are equal, round, and reactive to light. "   Neck:      Thyroid: No thyroid mass.      Trachea: Trachea normal.   Cardiovascular:      Rate and Rhythm: Normal rate and regular rhythm.      Heart sounds: Normal heart sounds. No murmur heard.     No gallop.   Pulmonary:      Effort: Pulmonary effort is normal.      Breath sounds: Normal breath sounds and air entry. No wheezing, rhonchi or rales.   Musculoskeletal:         General: No tenderness or deformity. Normal range of motion.      Cervical back: Full passive range of motion without pain, normal range of motion and neck supple.      Thoracic back: Normal.      Right lower leg: No edema.      Left lower leg: No edema.   Skin:     General: Skin is warm and dry.      Coloration: Skin is not jaundiced.      Findings: Rash (to right antecubital area. Small vesicles noted with erythema 3cm x 4cm round patch.) present.   Neurological:      Mental Status: She is alert and oriented to person, place, and time.      Sensory: Sensation is intact.      Motor: Motor function is intact.      Coordination: Coordination is intact.      Gait: Gait is intact.      Deep Tendon Reflexes: Reflexes are normal and symmetric.   Psychiatric:         Mood and Affect: Mood and affect normal.         Behavior: Behavior normal.         Judgment: Judgment normal.         Procedures    Assessment/Plan:     Diagnoses and all orders for this visit:    1. Flexural eczema (Primary)  Comments:  Start betamethasone ointment as prescribed. Steroid injection administered today. Start prednisone steroids tomorrow, 11/10/2023.  Orders:  -     Ambulatory Referral to Dermatology  -     betamethasone valerate (VALISONE) 0.1 % ointment; Apply 1 application  topically to the appropriate area as directed 2 (Two) Times a Day.  Dispense: 45 g; Refill: 2  -     predniSONE (DELTASONE) 10 MG (48) dose pack; Take as directed.  Dispense: 1 each; Refill: 0  -     dexAMETHasone (DECADRON) injection 10 mg    2. Atopic dermatitis, unspecified  type  Comments:  Ambulatory referral to dermatology.  Orders:  -     Ambulatory Referral to Dermatology  -     betamethasone valerate (VALISONE) 0.1 % ointment; Apply 1 application  topically to the appropriate area as directed 2 (Two) Times a Day.  Dispense: 45 g; Refill: 2  -     predniSONE (DELTASONE) 10 MG (48) dose pack; Take as directed.  Dispense: 1 each; Refill: 0  -     dexAMETHasone (DECADRON) injection 10 mg        BMI is >= 25 and <30. (Overweight) The following options were offered after discussion;: nutrition counseling/recommendations    An After Visit Summary was printed and given to the patient at discharge.  Return if symptoms worsen or fail to improve.       There are no Patient Instructions on file for this visit.      Discussion: Discussed antihistamines vs eczema.     I spent 20 minutes caring for Trinh on this date of service. This time includes time spent by me in the following activities: preparing for the visit, reviewing tests, obtaining and/or reviewing a separately obtained history, performing a medically appropriate examination and/or evaluation, counseling and educating the patient/family/caregiver, ordering medications, tests, or procedures, documenting information in the medical record, independently interpreting results and communicating that information with the patient/family/caregiver, and care coordination     Angle MOSER 11/10/2023   Electronically signed.      Transcribed from ambient dictation for EHSAN Durán by Alayna Tillman.  11/09/23   16:57 CST    Patient or patient representative verbalized consent to the visit recording.  I have personally performed the services described in this document as transcribed by the above individual, and it is both accurate and complete.

## 2024-02-01 DIAGNOSIS — K21.9 GASTROESOPHAGEAL REFLUX DISEASE WITHOUT ESOPHAGITIS: ICD-10-CM

## 2024-02-01 DIAGNOSIS — M62.838 MUSCLE SPASM: ICD-10-CM

## 2024-02-01 RX ORDER — PANTOPRAZOLE SODIUM 40 MG/1
40 TABLET, DELAYED RELEASE ORAL DAILY
Qty: 90 TABLET | Refills: 1 | Status: SHIPPED | OUTPATIENT
Start: 2024-02-01

## 2024-02-01 RX ORDER — TIZANIDINE 4 MG/1
4 TABLET ORAL
Qty: 90 TABLET | Refills: 1 | Status: SHIPPED | OUTPATIENT
Start: 2024-02-01

## 2024-02-03 DIAGNOSIS — J30.9 ALLERGIC RHINITIS, UNSPECIFIED SEASONALITY, UNSPECIFIED TRIGGER: ICD-10-CM

## 2024-02-05 RX ORDER — PSEUDOEPHEDRINE HCL 120 MG
120 TABLET, EXTENDED RELEASE ORAL EVERY 12 HOURS
Qty: 30 TABLET | Refills: 5 | Status: SHIPPED | OUTPATIENT
Start: 2024-02-05

## 2024-03-12 DIAGNOSIS — F32.A DEPRESSION, UNSPECIFIED DEPRESSION TYPE: ICD-10-CM

## 2024-03-12 RX ORDER — DESVENLAFAXINE 25 MG/1
25 TABLET, EXTENDED RELEASE ORAL DAILY
Qty: 30 TABLET | Refills: 5 | Status: SHIPPED | OUTPATIENT
Start: 2024-03-12

## 2024-04-11 ENCOUNTER — OFFICE VISIT (OUTPATIENT)
Age: 34
End: 2024-04-11
Payer: COMMERCIAL

## 2024-04-11 VITALS
TEMPERATURE: 97.5 F | SYSTOLIC BLOOD PRESSURE: 126 MMHG | BODY MASS INDEX: 26.09 KG/M2 | RESPIRATION RATE: 20 BRPM | WEIGHT: 152 LBS | DIASTOLIC BLOOD PRESSURE: 74 MMHG | HEART RATE: 109 BPM | OXYGEN SATURATION: 98 %

## 2024-04-11 DIAGNOSIS — H92.01 RIGHT EAR PAIN: ICD-10-CM

## 2024-04-11 DIAGNOSIS — J02.9 SORE THROAT: ICD-10-CM

## 2024-04-11 DIAGNOSIS — R59.0 CERVICAL ADENOPATHY: Primary | ICD-10-CM

## 2024-04-11 DIAGNOSIS — J03.90 ACUTE TONSILLITIS, UNSPECIFIED ETIOLOGY: ICD-10-CM

## 2024-04-11 LAB — S PYO AG THROAT QL: NORMAL

## 2024-04-11 PROCEDURE — 87880 STREP A ASSAY W/OPTIC: CPT

## 2024-04-11 PROCEDURE — 99213 OFFICE O/P EST LOW 20 MIN: CPT

## 2024-04-11 RX ORDER — CEFDINIR 300 MG/1
300 CAPSULE ORAL 2 TIMES DAILY
Qty: 20 CAPSULE | Refills: 0 | Status: SHIPPED | OUTPATIENT
Start: 2024-04-11 | End: 2024-04-21

## 2024-04-11 ASSESSMENT — ENCOUNTER SYMPTOMS
EYE ITCHING: 0
WHEEZING: 0
VOMITING: 0
COUGH: 0
BLOOD IN STOOL: 0
ABDOMINAL PAIN: 0
DIARRHEA: 0
RHINORRHEA: 0
COLOR CHANGE: 0
NAUSEA: 0
SHORTNESS OF BREATH: 0
SINUS PRESSURE: 0
CONSTIPATION: 0
SORE THROAT: 1
EYE DISCHARGE: 0

## 2024-04-11 NOTE — PATIENT INSTRUCTIONS
-Abx prescribed for tonsillitis, throat culture sent, will call with results. Patient aware if negative she is to stop the abx.  -Monitor for fever and treat as needed with tylenol or ibuprofen  -Recommended throat lozenges as needed and salt water gargles three times daily  -The patient is to follow up with PCP or return to clinic if symptoms worsen/fail to improve.     Urgent Care evaluation today is not a substitute for PCP visit. Follow up care is your responsibility to discuss and review this [facility] visit.

## 2024-04-12 NOTE — PROGRESS NOTES
Cefdinir oral abx prescribed for acute tonsillitis.   
constipation, diarrhea, nausea and vomiting.   Musculoskeletal:  Positive for neck pain. Negative for myalgias.   Skin:  Negative for color change and rash.   Neurological:  Negative for dizziness and headaches.   All other systems reviewed and are negative.      Objective    Physical Exam  Vitals and nursing note reviewed.   Constitutional:       General: She is not in acute distress.     Appearance: Normal appearance.   HENT:      Head: Normocephalic and atraumatic.      Right Ear: Tympanic membrane and ear canal normal. Tenderness present. Tympanic membrane is not erythematous or bulging.      Left Ear: Tympanic membrane and ear canal normal. Tympanic membrane is not erythematous or bulging.      Mouth/Throat:      Mouth: Mucous membranes are moist.      Pharynx: Pharyngeal swelling and posterior oropharyngeal erythema present. No oropharyngeal exudate.   Eyes:      Extraocular Movements: Extraocular movements intact.      Pupils: Pupils are equal, round, and reactive to light.   Cardiovascular:      Rate and Rhythm: Normal rate and regular rhythm.      Pulses: Normal pulses.      Heart sounds: Normal heart sounds. No murmur heard.  Pulmonary:      Effort: Pulmonary effort is normal. No respiratory distress.      Breath sounds: Normal breath sounds. No wheezing.   Musculoskeletal:      Cervical back: Tenderness present. No rigidity.   Lymphadenopathy:      Cervical: Cervical adenopathy (moderate) present.   Skin:     General: Skin is warm.      Capillary Refill: Capillary refill takes less than 2 seconds.      Coloration: Skin is not pale.      Findings: No rash.   Neurological:      General: No focal deficit present.      Mental Status: She is alert and oriented to person, place, and time.      Deep Tendon Reflexes: Reflexes are normal and symmetric.   Psychiatric:         Attention and Perception: Attention normal.         Mood and Affect: Mood normal.         Behavior: Behavior normal. Behavior is cooperative.

## 2024-04-13 LAB — BACTERIA THROAT AEROBE CULT: NORMAL

## 2024-05-03 DIAGNOSIS — J30.9 ALLERGIC RHINITIS, UNSPECIFIED SEASONALITY, UNSPECIFIED TRIGGER: ICD-10-CM

## 2024-05-03 RX ORDER — MONTELUKAST SODIUM 10 MG/1
10 TABLET ORAL
Qty: 90 TABLET | Refills: 1 | Status: SHIPPED | OUTPATIENT
Start: 2024-05-03

## 2024-05-09 ENCOUNTER — OFFICE VISIT (OUTPATIENT)
Dept: FAMILY MEDICINE CLINIC | Facility: CLINIC | Age: 34
End: 2024-05-09
Payer: COMMERCIAL

## 2024-05-09 VITALS
WEIGHT: 149.38 LBS | SYSTOLIC BLOOD PRESSURE: 110 MMHG | TEMPERATURE: 96.8 F | BODY MASS INDEX: 25.5 KG/M2 | OXYGEN SATURATION: 99 % | DIASTOLIC BLOOD PRESSURE: 65 MMHG | RESPIRATION RATE: 19 BRPM | HEART RATE: 109 BPM | HEIGHT: 64 IN

## 2024-05-09 DIAGNOSIS — H65.113 ACUTE ALLERGIC MUCOID OTITIS MEDIA, BILATERAL: ICD-10-CM

## 2024-05-09 DIAGNOSIS — J32.9 OTHER SINUSITIS, UNSPECIFIED CHRONICITY: ICD-10-CM

## 2024-05-09 DIAGNOSIS — J30.9 ALLERGIC RHINITIS, UNSPECIFIED SEASONALITY, UNSPECIFIED TRIGGER: Primary | ICD-10-CM

## 2024-05-09 PROCEDURE — 99214 OFFICE O/P EST MOD 30 MIN: CPT | Performed by: NURSE PRACTITIONER

## 2024-05-09 RX ORDER — FLUOCINONIDE TOPICAL SOLUTION USP, 0.05% 0.5 MG/ML
SOLUTION TOPICAL
COMMUNITY
Start: 2024-03-21

## 2024-05-09 RX ORDER — FEXOFENADINE HCL 180 MG/1
180 TABLET ORAL DAILY
Qty: 90 TABLET | Refills: 4 | Status: SHIPPED | OUTPATIENT
Start: 2024-05-09

## 2024-05-09 RX ORDER — FLUTICASONE PROPIONATE 0.05 MG/G
OINTMENT TOPICAL
COMMUNITY
Start: 2024-02-26

## 2024-05-09 RX ORDER — CEFUROXIME AXETIL 500 MG/1
500 TABLET ORAL 2 TIMES DAILY
Qty: 20 TABLET | Refills: 0 | Status: SHIPPED | OUTPATIENT
Start: 2024-05-09 | End: 2024-05-19

## 2024-05-09 RX ORDER — PREDNISONE 10 MG/1
TABLET ORAL
Qty: 1 EACH | Refills: 0 | Status: SHIPPED | OUTPATIENT
Start: 2024-05-09

## 2024-07-09 ENCOUNTER — OFFICE VISIT (OUTPATIENT)
Dept: FAMILY MEDICINE CLINIC | Facility: CLINIC | Age: 34
End: 2024-07-09
Payer: COMMERCIAL

## 2024-07-09 VITALS
RESPIRATION RATE: 20 BRPM | BODY MASS INDEX: 26.46 KG/M2 | HEART RATE: 103 BPM | HEIGHT: 64 IN | WEIGHT: 155 LBS | DIASTOLIC BLOOD PRESSURE: 88 MMHG | SYSTOLIC BLOOD PRESSURE: 118 MMHG | OXYGEN SATURATION: 99 % | TEMPERATURE: 97.6 F

## 2024-07-09 DIAGNOSIS — L03.116 CELLULITIS OF LEFT LOWER EXTREMITY: Primary | ICD-10-CM

## 2024-07-09 PROCEDURE — 99213 OFFICE O/P EST LOW 20 MIN: CPT | Performed by: NURSE PRACTITIONER

## 2024-07-09 RX ORDER — DOXYCYCLINE HYCLATE 100 MG/1
100 CAPSULE ORAL 2 TIMES DAILY
Qty: 14 CAPSULE | Refills: 0 | Status: SHIPPED | OUTPATIENT
Start: 2024-07-09 | End: 2024-07-16

## 2024-07-09 NOTE — PATIENT INSTRUCTIONS
Doxycyline twice a day for 7 days  Present to the Emergency Department if you develop fever or increased redness, swelling

## 2024-07-09 NOTE — PROGRESS NOTES
EHSAN Campa  [facility]   Family Medicine  [address]  [place] [address]  Phone: [phone]  Fax: [phone]         Chief Complaint:  Chief Complaint   Patient presents with    Insect Bite     States it felt itchy on Sunday and just has gotten worse.         History:  Trinh Castelan is a 33 y.o. female that is an established patient. She  is here for evaluation of the above complaint.    HPI     She was bit/stung by an unknown insect/arachnid 3 days ago, on the lateral aspect of the left ankle. She noticed itching the next day and then more erythema, inflammation today. No fever. She is having some pain today. No allergies to insects that she knows of. No n/v/d, no respiratory distress.    She is taking OCP, hasn't missed any doses.            ROS:  Review of Systems   Constitutional: Negative.  Negative for chills and fever.   HENT: Negative.     Respiratory: Negative.     Cardiovascular: Negative.    Gastrointestinal: Negative.    Musculoskeletal:  Positive for joint swelling.   Skin:  Positive for rash.         reports that she has never smoked. She has never used smokeless tobacco. She reports that she does not drink alcohol and does not use drugs.    Current Outpatient Medications   Medication Instructions    [facility] 12 Hour Nasal Decongestant 120 mg, Oral, Every 12 Hours    Desvenlafaxine Succinate ER 25 mg, Oral, Daily    doxycycline (VIBRAMYCIN) 100 mg, Oral, 2 Times Daily    fexofenadine (ALLEGRA ALLERGY) 180 mg, Oral, Daily    fluocinonide (LIDEX) 0.05 % external solution APPLY 5-10 DROPS TO THE SCALP ONCE NIGHT ONLY WHEN AND WHERE RASH IS PRESENT.    fluticasone (CUTIVATE) 0.005 % ointment APPLY TO AFFECTED AREA BEHIND EARS AND UNDER ARMS WHEN AND WHERE RASH IS PRESENT.    montelukast (SINGULAIR) 10 mg, Oral, Every Night at Bedtime    mupirocin (BACTROBAN) 2 % ointment APPLY A THIN LAYER TWICE DAILY TO THE AFFECTED AREAS BEHIND EARS AS NEEDED.     "norgestimate-ethinyl estradiol (Tri-Lo-Mily) 0.18/0.215/0.25 MG-25 MCG per tablet 1 tablet, Oral, Daily    pantoprazole (PROTONIX) 40 mg, Oral, Daily    predniSONE (DELTASONE) 10 MG (48) dose pack Take as directed.    tiZANidine (ZANAFLEX) 4 mg, Oral, Every Night at Bedtime       OBJECTIVE:  /88   Pulse 103   Temp 97.6 °F (36.4 °C) (Temporal)   Resp 20   Ht 162.6 cm (64.02\")   Wt 70.3 kg (155 lb)   SpO2 99%   BMI 26.59 kg/m²    Physical Exam  Vitals and nursing note reviewed.   Constitutional:       Appearance: Normal appearance.   HENT:      Head: Normocephalic and atraumatic.      Nose: Nose normal.   Eyes:      Conjunctiva/sclera: Conjunctivae normal.   Cardiovascular:      Rate and Rhythm: Normal rate and regular rhythm.      Pulses:           Dorsalis pedis pulses are 2+ on the left side.        Posterior tibial pulses are 2+ on the left side.   Pulmonary:      Effort: Pulmonary effort is normal.      Breath sounds: Normal breath sounds.   Musculoskeletal:        Feet:    Feet:      Left foot:      Skin integrity: Blister and erythema present.      Toenail Condition: Left toenails are normal.      Comments: Erythema, swelling  Neurological:      General: No focal deficit present.      Mental Status: She is alert and oriented to person, place, and time.   Psychiatric:         Mood and Affect: Mood normal.         Behavior: Behavior normal.         Procedures    Assessment/Plan:     Diagnoses and all orders for this visit:    1. Cellulitis of left lower extremity (Primary)  -     doxycycline (VIBRAMYCIN) 100 MG capsule; Take 1 capsule by mouth 2 (Two) Times a Day for 7 days.  Dispense: 14 capsule; Refill: 0             An After Visit Summary was printed and given to the patient at discharge.  Return for Next scheduled follow up.       Patient Instructions   Doxycyline twice a day for 7 days  Present to the Emergency Department if you develop fever or increased redness, swelling      Discussion: "     Pt cautioned to continue OCP while on doxycyline. She was instructed to present to the ED if fever or increased erythema, swelling    I spent 25 minutes caring for Trinh on this date of service. This time includes time spent by me in the following activities: preparing for the visit, reviewing tests, performing a medically appropriate examination and/or evaluation, counseling and educating the patient/family/caregiver, documenting information in the medical record, independently interpreting results and communicating that information with the patient/family/caregiver, care coordination, ordering medications, and obtaining a separately obtained history     Ligia MOSER 7/9/2024   Electronically signed.

## 2024-08-01 DIAGNOSIS — K21.9 GASTROESOPHAGEAL REFLUX DISEASE WITHOUT ESOPHAGITIS: ICD-10-CM

## 2024-08-01 RX ORDER — PANTOPRAZOLE SODIUM 40 MG/1
40 TABLET, DELAYED RELEASE ORAL DAILY
Qty: 90 TABLET | Refills: 1 | Status: SHIPPED | OUTPATIENT
Start: 2024-08-01

## 2024-08-02 DIAGNOSIS — M62.838 MUSCLE SPASM: ICD-10-CM

## 2024-08-02 RX ORDER — TIZANIDINE 4 MG/1
4 TABLET ORAL
Qty: 90 TABLET | Refills: 1 | Status: SHIPPED | OUTPATIENT
Start: 2024-08-02

## 2024-09-15 DIAGNOSIS — F32.A DEPRESSION, UNSPECIFIED DEPRESSION TYPE: ICD-10-CM

## 2024-09-16 RX ORDER — DESVENLAFAXINE 25 MG/1
25 TABLET, EXTENDED RELEASE ORAL DAILY
Qty: 30 TABLET | Refills: 5 | Status: SHIPPED | OUTPATIENT
Start: 2024-09-16

## 2024-09-17 DIAGNOSIS — J30.9 ALLERGIC RHINITIS, UNSPECIFIED SEASONALITY, UNSPECIFIED TRIGGER: ICD-10-CM

## 2024-09-17 RX ORDER — PSEUDOEPHEDRINE HCL 120 MG
120 TABLET, EXTENDED RELEASE ORAL EVERY 12 HOURS
Qty: 30 TABLET | Refills: 5 | Status: SHIPPED | OUTPATIENT
Start: 2024-09-17

## 2024-10-25 DIAGNOSIS — Z30.41 ORAL CONTRACEPTIVE USE: ICD-10-CM

## 2024-10-25 RX ORDER — NORGESTIMATE AND ETHINYL ESTRADIOL
1 KIT DAILY
Qty: 84 TABLET | Refills: 4 | Status: SHIPPED | OUTPATIENT
Start: 2024-10-25

## 2024-10-29 DIAGNOSIS — J30.9 ALLERGIC RHINITIS, UNSPECIFIED SEASONALITY, UNSPECIFIED TRIGGER: ICD-10-CM

## 2024-10-29 RX ORDER — MONTELUKAST SODIUM 10 MG/1
10 TABLET ORAL
Qty: 90 TABLET | Refills: 1 | Status: SHIPPED | OUTPATIENT
Start: 2024-10-29

## 2024-11-07 ENCOUNTER — OFFICE VISIT (OUTPATIENT)
Dept: FAMILY MEDICINE CLINIC | Facility: CLINIC | Age: 34
End: 2024-11-07
Payer: COMMERCIAL

## 2024-11-07 ENCOUNTER — HOSPITAL ENCOUNTER (OUTPATIENT)
Dept: GENERAL RADIOLOGY | Facility: HOSPITAL | Age: 34
Discharge: HOME | End: 2024-11-07
Admitting: NURSE PRACTITIONER
Payer: COMMERCIAL

## 2024-11-07 VITALS
RESPIRATION RATE: 19 BRPM | HEART RATE: 91 BPM | WEIGHT: 153 LBS | TEMPERATURE: 96.9 F | BODY MASS INDEX: 26.12 KG/M2 | HEIGHT: 64 IN | DIASTOLIC BLOOD PRESSURE: 65 MMHG | OXYGEN SATURATION: 99 % | SYSTOLIC BLOOD PRESSURE: 110 MMHG

## 2024-11-07 DIAGNOSIS — J30.9 ALLERGIC RHINITIS, UNSPECIFIED SEASONALITY, UNSPECIFIED TRIGGER: ICD-10-CM

## 2024-11-07 DIAGNOSIS — M54.50 ACUTE BILATERAL LOW BACK PAIN WITHOUT SCIATICA: ICD-10-CM

## 2024-11-07 DIAGNOSIS — M62.838 MUSCLE SPASM: ICD-10-CM

## 2024-11-07 DIAGNOSIS — Z11.59 ENCOUNTER FOR HEPATITIS C SCREENING TEST FOR LOW RISK PATIENT: ICD-10-CM

## 2024-11-07 DIAGNOSIS — F32.A DEPRESSION, UNSPECIFIED DEPRESSION TYPE: ICD-10-CM

## 2024-11-07 DIAGNOSIS — R53.83 OTHER FATIGUE: ICD-10-CM

## 2024-11-07 DIAGNOSIS — G25.81 RLS (RESTLESS LEGS SYNDROME): ICD-10-CM

## 2024-11-07 DIAGNOSIS — Z00.00 WELLNESS EXAMINATION: Primary | ICD-10-CM

## 2024-11-07 LAB
BILIRUB BLD-MCNC: NEGATIVE MG/DL
CLARITY, POC: ABNORMAL
COLOR UR: YELLOW
GLUCOSE UR STRIP-MCNC: NEGATIVE MG/DL
KETONES UR QL: NEGATIVE
LEUKOCYTE EST, POC: NEGATIVE
NITRITE UR-MCNC: NEGATIVE MG/ML
PH UR: 6 [PH] (ref 5–8)
PROT UR STRIP-MCNC: NEGATIVE MG/DL
RBC # UR STRIP: NEGATIVE /UL
SP GR UR: 1.03 (ref 1–1.03)
UROBILINOGEN UR QL: NORMAL

## 2024-11-07 PROCEDURE — 72100 X-RAY EXAM L-S SPINE 2/3 VWS: CPT

## 2024-11-07 RX ORDER — NAPROXEN 500 MG/1
500 TABLET ORAL 2 TIMES DAILY WITH MEALS
Qty: 60 TABLET | Refills: 0 | Status: SHIPPED | OUTPATIENT
Start: 2024-11-07

## 2024-11-07 RX ORDER — DESVENLAFAXINE 25 MG/1
25 TABLET, EXTENDED RELEASE ORAL DAILY
Qty: 90 TABLET | Refills: 4 | Status: SHIPPED | OUTPATIENT
Start: 2024-11-07

## 2024-11-07 RX ORDER — PSEUDOEPHEDRINE HCL 120 MG
120 TABLET, EXTENDED RELEASE ORAL EVERY 12 HOURS
Qty: 60 TABLET | Refills: 5 | Status: SHIPPED | OUTPATIENT
Start: 2024-11-07

## 2024-11-07 RX ORDER — METHYLPREDNISOLONE 4 MG/1
TABLET ORAL
Qty: 21 TABLET | Refills: 0 | Status: SHIPPED | OUTPATIENT
Start: 2024-11-07

## 2024-11-07 NOTE — PROGRESS NOTES
EHSAN Durán  [facility]   Family Medicine  [address]  [place] [address]  Phone: [phone]  Fax: [phone]         Chief Complaint:  Chief Complaint   Patient presents with    6-month follow up    Back Pain    Fatigue    Med Refill        History:  Trinh Castelan is a 33 y.o. female.    Wellness:   Immunizations:      - Tetanus: Unknown or >10 years ago. Recommend to have at pharmacy or on injury.      - Influenza: Recommend yearly.      - Prevnar: Not indicated.       - Shingrix: Series after 50      - COVID: Recommended per CDC guidelines.       -RSV:Not indicated.   CRC screening: WNL, 2023, no family hx of colon cancer   Mammogram: maternal grandmother breast cancer.   PAP: Not indicated, not sexually active.   DEXA: DEXA scan at 67   Low dose CT chest: Not indicated.   Mental health concerns: None  Smoking status: reports that she has never smoked. She has never used smokeless tobacco. She reports that she does not drink alcohol and does not use drugs.  History of Present Illness  The patient presents for evaluation of back pain.    Back pain: She began experiencing back pain on Sunday, which persisted throughout the day. The pain intensifies with activity, and even the touch of a chair against her back causes discomfort. She describes the pain as a burning sensation accompanied by spasms. The pain does not radiate down her legs, and she has no issues with bowel movements or constipation. She reports no unusual activities or dietary changes that could have triggered the pain.    RLS: She takes tizanidine nightly to manage leg jerks.    Fatigue: Additionally, she reports feeling fatigued recently.           ROS:  Review of Systems   Constitutional:  Positive for fatigue. Negative for fever and unexpected weight change.   HENT:  Negative for congestion, ear pain, rhinorrhea, sinus pressure, sinus pain and voice change.    Eyes:  Negative for visual  "disturbance.   Respiratory:  Negative for shortness of breath and wheezing.    Cardiovascular:  Negative for chest pain and palpitations.   Gastrointestinal:  Negative for abdominal pain, nausea and vomiting.   Genitourinary:  Negative for dysuria and flank pain.   Musculoskeletal:  Positive for back pain. Negative for myalgias and neck pain.   Skin:  Negative for color change and rash.   Neurological:  Negative for dizziness, weakness, numbness and headaches.   Psychiatric/Behavioral:  Negative for behavioral problems, dysphoric mood, self-injury and sleep disturbance.         reports that she has never smoked. She has never used smokeless tobacco. She reports that she does not drink alcohol and does not use drugs.    Current Outpatient Medications   Medication Instructions    [facility]S 12 Hour Nasal Decongestant 120 mg, Oral, Every 12 Hours    Desvenlafaxine Succinate ER 25 mg, Oral, Daily    fexofenadine (ALLEGRA ALLERGY) 180 mg, Oral, Daily    fluocinonide (LIDEX) 0.05 % external solution APPLY 5-10 DROPS TO THE SCALP ONCE NIGHT ONLY WHEN AND WHERE RASH IS PRESENT.    fluticasone (CUTIVATE) 0.005 % ointment APPLY TO AFFECTED AREA BEHIND EARS AND UNDER ARMS WHEN AND WHERE RASH IS PRESENT.    methylPREDNISolone (MEDROL) 4 MG dose pack Take as directed on package instructions.    montelukast (SINGULAIR) 10 mg, Oral, Every Night at Bedtime    mupirocin (BACTROBAN) 2 % ointment APPLY A THIN LAYER TWICE DAILY TO THE AFFECTED AREAS BEHIND EARS AS NEEDED.    naproxen (NAPROSYN) 500 mg, Oral, 2 Times Daily With Meals    pantoprazole (PROTONIX) 40 mg, Oral, Daily    tiZANidine (ZANAFLEX) 4 mg, Oral, Every Night at Bedtime    Tri-Lo-Mily 0.18/0.215/0.25 MG-25 MCG per tablet 1 tablet, Oral, Daily       OBJECTIVE:  /65 (BP Location: Left arm, Patient Position: Sitting, Cuff Size: Adult)   Pulse 91   Temp 96.9 °F (36.1 °C) (Infrared)   Resp 19   Ht 162.6 cm (64.02\")   Wt 69.4 kg (153 lb)   SpO2 99%   BMI 26.25 kg/m²  "   Physical Exam  Vitals and nursing note reviewed.   Constitutional:       Appearance: Normal appearance. She is well-developed.   HENT:      Head: Normocephalic and atraumatic.      Right Ear: Tympanic membrane, ear canal and external ear normal.      Left Ear: Tympanic membrane, ear canal and external ear normal.      Nose: Nose normal. No septal deviation, nasal tenderness or congestion.      Mouth/Throat:      Lips: Pink. No lesions.      Mouth: Mucous membranes are moist. No oral lesions.      Dentition: Normal dentition.      Pharynx: Oropharynx is clear. No pharyngeal swelling, oropharyngeal exudate or posterior oropharyngeal erythema.   Eyes:      General: Lids are normal. Vision grossly intact. No scleral icterus.        Right eye: No discharge.         Left eye: No discharge.      Extraocular Movements: Extraocular movements intact.      Conjunctiva/sclera: Conjunctivae normal.      Right eye: Right conjunctiva is not injected.      Left eye: Left conjunctiva is not injected.      Pupils: Pupils are equal, round, and reactive to light.   Neck:      Thyroid: No thyroid mass.      Trachea: Trachea normal.   Cardiovascular:      Rate and Rhythm: Normal rate and regular rhythm.      Heart sounds: Normal heart sounds. No murmur heard.     No gallop.   Pulmonary:      Effort: Pulmonary effort is normal.      Breath sounds: Normal breath sounds and air entry. No wheezing, rhonchi or rales.   Musculoskeletal:         General: No tenderness or deformity. Normal range of motion.      Cervical back: Full passive range of motion without pain, normal range of motion and neck supple.      Thoracic back: Normal.        Back:       Right lower leg: No edema.      Left lower leg: No edema.      Comments: Pain with palpation.    Skin:     General: Skin is warm and dry.      Coloration: Skin is not jaundiced.      Findings: No rash.   Neurological:      Mental Status: She is alert and oriented to person, place, and time.       Sensory: Sensation is intact.      Motor: Motor function is intact.      Coordination: Coordination is intact.      Gait: Gait is intact.      Deep Tendon Reflexes: Reflexes are normal and symmetric.   Psychiatric:         Mood and Affect: Mood and affect normal.         Behavior: Behavior normal.         Judgment: Judgment normal.       Physical Exam           Procedures    Results  Laboratory Studies  Urine test: Negative    Assessment/Plan:     Diagnoses and all orders for this visit:    1. Wellness examination (Primary)    2. Acute bilateral low back pain without sciatica  -     XR Spine Lumbar 2 or 3 View; Future  -     methylPREDNISolone (MEDROL) 4 MG dose pack; Take as directed on package instructions.  Dispense: 21 tablet; Refill: 0  -     naproxen (Naprosyn) 500 MG tablet; Take 1 tablet by mouth 2 (Two) Times a Day With Meals.  Dispense: 60 tablet; Refill: 0  -     POC Urinalysis Dipstick, Multipro    3. Muscle spasm    4. Encounter for hepatitis C screening test for low risk patient  -     Hepatitis C antibody; Future    5. Depression, unspecified depression type  -     Desvenlafaxine Succinate ER 25 MG tablet sustained-release 24 hour; Take 1 tablet by mouth Daily.  Dispense: 90 tablet; Refill: 4    6. Allergic rhinitis, unspecified seasonality, unspecified trigger  -     CVS 12 Hour Nasal Decongestant 120 MG 12 hr tablet; Take 1 tablet by mouth Every 12 (Twelve) Hours.  Dispense: 60 tablet; Refill: 5    7. Other fatigue  -     CBC & Differential; Future  -     Comprehensive Metabolic Panel; Future  -     T4, Free; Future  -     TSH; Future  -     T3, Free; Future  -     Vitamin B12; Future  -     Vitamin B6; Future  -     Lipid Panel; Future  -     Vitamin D 25 hydroxy; Future    8. RLS (restless legs syndrome)          An After Visit Summary was printed and given to the patient at discharge.  No follow-ups on file.       Assessment & Plan  1. Back pain.  The patient's back pain appears to be due to  muscle spasms. The pain worsens with activity and when sitting in a chair. A urine test was conducted to rule out kidney stones, which returned negative results. An x-ray of the back will be performed to ensure there are no other underlying issues. She is advised to continue taking Zanaflex at bedtime and to start taking naproxen twice a day after meals. A course of steroids will also be initiated. If the pain persists, anti-inflammatories may be considered.    2. Fatigue.  The patient reports feeling very tired lately. It is unclear if this is related to her back pain. Labs will be conducted to investigate the cause of her fatigue. These labs need to be fasting and should be done either before starting the steroids or a month after completing the steroid treatment.    3. Medication Management.  Refills for the patient's medications, including Zanaflex, Protonix, Singulair, Allegra, and Pristiq, were reviewed. The Pristiq prescription will be adjusted to a 90-day supply as requested by the patient. Sudafed will be prescribed for 60 days to accommodate insurance limitations.      I spent 10 minutes of time was spent on patient's wellness exam and 31 minutes of time was spent on patient's chronic/acute conditions for Trinh on this date of service. This time includes time spent by me in the following activities: preparing for the visit, reviewing tests, performing a medically appropriate examination and/or evaluation, counseling and educating the patient/family/caregiver, documenting information in the medical record, independently interpreting results and communicating that information with the patient/family/caregiver, care coordination, ordering medications, and ordering test(s)         Angle MOSER 11/7/2024   Electronically signed.    Patient or patient representative verbalized consent for the use of Ambient Listening during the visit with  EHSAN Durán for chart documentation.  11/7/2024  15:52 CST

## 2024-11-11 ENCOUNTER — LAB (OUTPATIENT)
Dept: LAB | Facility: HOSPITAL | Age: 34
End: 2024-11-11
Payer: COMMERCIAL

## 2024-11-11 DIAGNOSIS — R53.83 OTHER FATIGUE: ICD-10-CM

## 2024-11-11 DIAGNOSIS — Z11.59 ENCOUNTER FOR HEPATITIS C SCREENING TEST FOR LOW RISK PATIENT: ICD-10-CM

## 2024-11-11 LAB
25(OH)D3 SERPL-MCNC: 22.7 NG/ML (ref 30–100)
ALBUMIN SERPL-MCNC: 4 G/DL (ref 3.5–5.2)
ALBUMIN/GLOB SERPL: 1.4 G/DL
ALP SERPL-CCNC: 74 U/L (ref 39–117)
ALT SERPL W P-5'-P-CCNC: 13 U/L (ref 1–33)
ANION GAP SERPL CALCULATED.3IONS-SCNC: 11 MMOL/L (ref 5–15)
AST SERPL-CCNC: 17 U/L (ref 1–32)
BASOPHILS # BLD AUTO: 0.05 10*3/MM3 (ref 0–0.2)
BASOPHILS NFR BLD AUTO: 0.9 % (ref 0–1.5)
BILIRUB SERPL-MCNC: 0.4 MG/DL (ref 0–1.2)
BUN SERPL-MCNC: 12 MG/DL (ref 6–20)
BUN/CREAT SERPL: 13.8 (ref 7–25)
CALCIUM SPEC-SCNC: 9.4 MG/DL (ref 8.6–10.5)
CHLORIDE SERPL-SCNC: 103 MMOL/L (ref 98–107)
CHOLEST SERPL-MCNC: 204 MG/DL (ref 0–200)
CO2 SERPL-SCNC: 23 MMOL/L (ref 22–29)
CREAT SERPL-MCNC: 0.87 MG/DL (ref 0.57–1)
DEPRECATED RDW RBC AUTO: 39.8 FL (ref 37–54)
EGFRCR SERPLBLD CKD-EPI 2021: 91.5 ML/MIN/1.73
EOSINOPHIL # BLD AUTO: 0.13 10*3/MM3 (ref 0–0.4)
EOSINOPHIL NFR BLD AUTO: 2.4 % (ref 0.3–6.2)
ERYTHROCYTE [DISTWIDTH] IN BLOOD BY AUTOMATED COUNT: 12.5 % (ref 12.3–15.4)
GLOBULIN UR ELPH-MCNC: 2.9 GM/DL
GLUCOSE SERPL-MCNC: 91 MG/DL (ref 65–99)
HCT VFR BLD AUTO: 42.4 % (ref 34–46.6)
HCV AB SER QL: NORMAL
HDLC SERPL-MCNC: 61 MG/DL (ref 40–60)
HGB BLD-MCNC: 14.5 G/DL (ref 12–15.9)
IMM GRANULOCYTES # BLD AUTO: 0.02 10*3/MM3 (ref 0–0.05)
IMM GRANULOCYTES NFR BLD AUTO: 0.4 % (ref 0–0.5)
LDLC SERPL CALC-MCNC: 119 MG/DL (ref 0–100)
LDLC/HDLC SERPL: 1.9 {RATIO}
LYMPHOCYTES # BLD AUTO: 2.17 10*3/MM3 (ref 0.7–3.1)
LYMPHOCYTES NFR BLD AUTO: 40.6 % (ref 19.6–45.3)
MCH RBC QN AUTO: 29.9 PG (ref 26.6–33)
MCHC RBC AUTO-ENTMCNC: 34.2 G/DL (ref 31.5–35.7)
MCV RBC AUTO: 87.4 FL (ref 79–97)
MONOCYTES # BLD AUTO: 0.26 10*3/MM3 (ref 0.1–0.9)
MONOCYTES NFR BLD AUTO: 4.9 % (ref 5–12)
NEUTROPHILS NFR BLD AUTO: 2.72 10*3/MM3 (ref 1.7–7)
NEUTROPHILS NFR BLD AUTO: 50.8 % (ref 42.7–76)
NRBC BLD AUTO-RTO: 0 /100 WBC (ref 0–0.2)
PLATELET # BLD AUTO: 258 10*3/MM3 (ref 140–450)
PMV BLD AUTO: 10.6 FL (ref 6–12)
POTASSIUM SERPL-SCNC: 4 MMOL/L (ref 3.5–5.2)
PROT SERPL-MCNC: 6.9 G/DL (ref 6–8.5)
RBC # BLD AUTO: 4.85 10*6/MM3 (ref 3.77–5.28)
SODIUM SERPL-SCNC: 137 MMOL/L (ref 136–145)
T3FREE SERPL-MCNC: 2.8 PG/ML (ref 2–4.4)
T4 FREE SERPL-MCNC: 1.08 NG/DL (ref 0.93–1.7)
TRIGL SERPL-MCNC: 137 MG/DL (ref 0–150)
TSH SERPL DL<=0.05 MIU/L-ACNC: 1.31 UIU/ML (ref 0.27–4.2)
VIT B12 BLD-MCNC: 328 PG/ML (ref 211–946)
VLDLC SERPL-MCNC: 24 MG/DL (ref 5–40)
WBC NRBC COR # BLD AUTO: 5.35 10*3/MM3 (ref 3.4–10.8)

## 2024-11-11 PROCEDURE — 82607 VITAMIN B-12: CPT

## 2024-11-11 PROCEDURE — 80061 LIPID PANEL: CPT

## 2024-11-11 PROCEDURE — 84207 ASSAY OF VITAMIN B-6: CPT

## 2024-11-11 PROCEDURE — 82306 VITAMIN D 25 HYDROXY: CPT

## 2024-11-11 PROCEDURE — 80050 GENERAL HEALTH PANEL: CPT

## 2024-11-11 PROCEDURE — 84481 FREE ASSAY (FT-3): CPT

## 2024-11-11 PROCEDURE — 84439 ASSAY OF FREE THYROXINE: CPT

## 2024-11-11 PROCEDURE — 36415 COLL VENOUS BLD VENIPUNCTURE: CPT

## 2024-11-11 PROCEDURE — 86803 HEPATITIS C AB TEST: CPT

## 2024-11-15 LAB — PYRIDOXAL PHOS SERPL-MCNC: 8.9 UG/L (ref 3.4–65.2)

## 2024-12-04 DIAGNOSIS — M54.50 ACUTE BILATERAL LOW BACK PAIN WITHOUT SCIATICA: ICD-10-CM

## 2024-12-04 RX ORDER — NAPROXEN 500 MG/1
500 TABLET ORAL 2 TIMES DAILY WITH MEALS
Qty: 60 TABLET | Refills: 0 | Status: SHIPPED | OUTPATIENT
Start: 2024-12-04

## 2025-01-29 DIAGNOSIS — M62.838 MUSCLE SPASM: ICD-10-CM

## 2025-01-29 DIAGNOSIS — K21.9 GASTROESOPHAGEAL REFLUX DISEASE WITHOUT ESOPHAGITIS: ICD-10-CM

## 2025-01-29 RX ORDER — PANTOPRAZOLE SODIUM 40 MG/1
40 TABLET, DELAYED RELEASE ORAL DAILY
Qty: 90 TABLET | Refills: 1 | Status: SHIPPED | OUTPATIENT
Start: 2025-01-29

## 2025-01-30 ENCOUNTER — NURSE TRIAGE (OUTPATIENT)
Dept: CALL CENTER | Facility: HOSPITAL | Age: 35
End: 2025-01-30
Payer: COMMERCIAL

## 2025-01-30 NOTE — TELEPHONE ENCOUNTER
She stated she has had a rash on her arms for over a week, was seen by her dermatologist yesterday, and received a steroid shot which has not helped.  She did not contact the dermatologist to let them know the shot did not help.  Decided she didn't want to be triaged after we started talking and plans to call Angle tomorrow.

## 2025-01-31 NOTE — TELEPHONE ENCOUNTER
Called patient to schedule a office appointment for rash per PCP recommendation. HUB OKAY TO SCHEDULE AND RELAY.

## 2025-02-05 ENCOUNTER — OFFICE VISIT (OUTPATIENT)
Dept: FAMILY MEDICINE CLINIC | Facility: CLINIC | Age: 35
End: 2025-02-05
Payer: COMMERCIAL

## 2025-02-05 VITALS
DIASTOLIC BLOOD PRESSURE: 75 MMHG | TEMPERATURE: 97.6 F | OXYGEN SATURATION: 98 % | HEIGHT: 64 IN | WEIGHT: 152 LBS | RESPIRATION RATE: 20 BRPM | BODY MASS INDEX: 25.95 KG/M2 | SYSTOLIC BLOOD PRESSURE: 120 MMHG | HEART RATE: 96 BPM

## 2025-02-05 DIAGNOSIS — L25.9 CONTACT DERMATITIS, UNSPECIFIED CONTACT DERMATITIS TYPE, UNSPECIFIED TRIGGER: ICD-10-CM

## 2025-02-05 DIAGNOSIS — L20.82 FLEXURAL ECZEMA: Primary | ICD-10-CM

## 2025-02-05 PROCEDURE — 99213 OFFICE O/P EST LOW 20 MIN: CPT | Performed by: NURSE PRACTITIONER

## 2025-02-05 RX ORDER — DOXYCYCLINE 100 MG/1
CAPSULE ORAL
COMMUNITY
Start: 2025-01-31

## 2025-02-05 RX ORDER — PREDNISONE 20 MG/1
TABLET ORAL
Qty: 37 TABLET | Refills: 0 | Status: SHIPPED | OUTPATIENT
Start: 2025-02-05

## 2025-02-05 RX ORDER — CLOBETASOL PROPIONATE 0.5 MG/G
OINTMENT TOPICAL
COMMUNITY
Start: 2025-01-22

## 2025-02-05 NOTE — PROGRESS NOTES
EHSAN Durán  [facility]   Family Medicine  [address]  [place] [address]  Phone: [phone]  Fax: [phone]         Chief Complaint:  Chief Complaint   Patient presents with    Rash     Patient stated that her rash on both of her arms has now swollen.        History:  Trinh Castelan is a 34 y.o. female.  History of Present Illness  The patient is a 34-year-old female who presents today for evaluation of a rash.    Eczema exacerbation: She reports the onset of the rash approximately 3 weeks ago, which has since worsened. The rash is characterized by swelling, itching, burning, and pain. She underwent allergy testing conducted by Dr. Robin, which revealed allergies to LANOLIN and HAIR DYE. Despite not having these substances in her environment, the rash persists. She has not experienced any fever, shortness of breath, or wheezing, but does report mild chest tightness, which she suspects may be a side effect of the antibiotic. This is her first experience with such a severe rash. Initial treatment with a steroid injection was ineffective, leading to the administration of an antibiotic, which provided some relief. She has been on doxycycline for nearly a week, with 2 days remaining in the course.    ALLERGIES  The patient is allergic to LANOLIN and HAIR DYE.    MEDICATIONS  Current: doxycycline       ROS:  Review of Systems   Constitutional:  Positive for fatigue. Negative for chills and diaphoresis.   Gastrointestinal:  Negative for abdominal pain, nausea and vomiting.   Genitourinary:  Negative for dysuria.   Skin:  Positive for rash.        reports that she has never smoked. She has never used smokeless tobacco. She reports that she does not drink alcohol and does not use drugs.    Current Outpatient Medications   Medication Instructions    clobetasol (TEMOVATE) 0.05 % ointment Please see attached for detailed directions    CVS 12 Hour Nasal Decongestant 120 mg,  "Oral, Every 12 Hours    Desvenlafaxine Succinate ER 25 mg, Oral, Daily    doxycycline (MONODOX) 100 MG capsule TAKE TWICE A DAILY FOR 7 DAYS    fexofenadine (ALLEGRA ALLERGY) 180 mg, Oral, Daily    fluocinonide (LIDEX) 0.05 % external solution APPLY 5-10 DROPS TO THE SCALP ONCE NIGHT ONLY WHEN AND WHERE RASH IS PRESENT.    fluticasone (CUTIVATE) 0.005 % ointment APPLY TO AFFECTED AREA BEHIND EARS AND UNDER ARMS WHEN AND WHERE RASH IS PRESENT.    montelukast (SINGULAIR) 10 mg, Oral, Every Night at Bedtime    mupirocin (BACTROBAN) 2 % ointment APPLY A THIN LAYER TWICE DAILY TO THE AFFECTED AREAS BEHIND EARS AS NEEDED.    naproxen (NAPROSYN) 500 mg, Oral, 2 Times Daily With Meals    pantoprazole (PROTONIX) 40 mg, Oral, Daily    predniSONE (DELTASONE) 20 MG tablet Take 1 tablet bid x 7 days, then 1.5 tablet daily x 7 days, then 1 tablet daily x 7 days, then 1/2 tablet daily.    tiZANidine (ZANAFLEX) 4 mg, Oral, Every Night at Bedtime    Tri-Lo-Mily 0.18/0.215/0.25 MG-25 MCG per tablet 1 tablet, Oral, Daily       OBJECTIVE:  /75 (BP Location: Right arm, Patient Position: Sitting, Cuff Size: Adult)   Pulse 96   Temp 97.6 °F (36.4 °C) (Infrared)   Resp 20   Ht 162.6 cm (64.02\")   Wt 68.9 kg (152 lb)   SpO2 98%   BMI 26.08 kg/m²    Physical Exam  Vitals and nursing note reviewed.   Constitutional:       Appearance: Normal appearance. She is well-developed.   HENT:      Head: Normocephalic and atraumatic.      Right Ear: Tympanic membrane, ear canal and external ear normal.      Left Ear: Tympanic membrane, ear canal and external ear normal.      Nose: Nose normal. No septal deviation, nasal tenderness or congestion.      Mouth/Throat:      Lips: Pink. No lesions.      Mouth: Mucous membranes are moist. No oral lesions.      Dentition: Normal dentition.      Pharynx: Oropharynx is clear. No pharyngeal swelling, oropharyngeal exudate or posterior oropharyngeal erythema.   Eyes:      General: Lids are normal. " Vision grossly intact. No scleral icterus.        Right eye: No discharge.         Left eye: No discharge.      Extraocular Movements: Extraocular movements intact.      Conjunctiva/sclera: Conjunctivae normal.      Right eye: Right conjunctiva is not injected.      Left eye: Left conjunctiva is not injected.      Pupils: Pupils are equal, round, and reactive to light.   Neck:      Thyroid: No thyroid mass.      Trachea: Trachea normal.   Cardiovascular:      Rate and Rhythm: Normal rate and regular rhythm.      Heart sounds: Normal heart sounds. No murmur heard.     No gallop.   Pulmonary:      Effort: Pulmonary effort is normal.      Breath sounds: Normal breath sounds and air entry. No wheezing, rhonchi or rales.   Musculoskeletal:         General: No tenderness or deformity. Normal range of motion.      Cervical back: Full passive range of motion without pain, normal range of motion and neck supple.      Thoracic back: Normal.      Right lower leg: No edema.      Left lower leg: No edema.   Skin:     General: Skin is warm and dry.      Coloration: Skin is not jaundiced.      Findings: Rash present.      Comments: Rash blanches on exam, rash is raised, and skin is rough with palpation.    Neurological:      Mental Status: She is alert and oriented to person, place, and time.      Sensory: Sensation is intact.      Motor: Motor function is intact.      Coordination: Coordination is intact.      Gait: Gait is intact.      Deep Tendon Reflexes: Reflexes are normal and symmetric.   Psychiatric:         Mood and Affect: Mood and affect normal.         Behavior: Behavior normal.         Judgment: Judgment normal.             Physical Exam  Skin exam was performed on the arms, legs, and back.    BMI is >= 25 and <30. (Overweight) The following options were offered after discussion;: nutrition counseling/recommendations    Procedures    Results  Laboratory Studies  Allergy test showed allergies to LANOLIN and HAIR  DYE.    Assessment/Plan:     Diagnoses and all orders for this visit:    1. Flexural eczema (Primary)  -     predniSONE (DELTASONE) 20 MG tablet; Take 1 tablet bid x 7 days, then 1.5 tablet daily x 7 days, then 1 tablet daily x 7 days, then 1/2 tablet daily.  Dispense: 37 tablet; Refill: 0    2. Contact dermatitis, unspecified contact dermatitis type, unspecified trigger          An After Visit Summary was printed and given to the patient at discharge.  Return in about 4 weeks (around 3/5/2025).       Assessment & Plan  1. Severe eczema flare-up.  The rash has been present for about 3 weeks and has worsened despite previous treatments, including a steroid shot and doxycycline. The rash blanches upon pressure, which is reassuring and suggests it is not vasculitis or a strep infection. The patient reports significant itching, burning, and discomfort.    I spent 27 minutes caring for Trinh on this date of service. This time includes time spent by me in the following activities: preparing for the visit, reviewing tests, performing a medically appropriate examination and/or evaluation, counseling and educating the patient/family/caregiver, documenting information in the medical record, independently interpreting results and communicating that information with the patient/family/caregiver, care coordination, and ordering medications         Angle MOSER 2/6/2025   Electronically signed.    Patient or patient representative verbalized consent for the use of Ambient Listening during the visit with  EHSAN Durán for chart documentation. 2/6/2025  11:54 CST

## 2025-03-05 ENCOUNTER — OFFICE VISIT (OUTPATIENT)
Dept: FAMILY MEDICINE CLINIC | Facility: CLINIC | Age: 35
End: 2025-03-05
Payer: COMMERCIAL

## 2025-03-05 VITALS
OXYGEN SATURATION: 99 % | DIASTOLIC BLOOD PRESSURE: 75 MMHG | BODY MASS INDEX: 25.78 KG/M2 | HEIGHT: 64 IN | SYSTOLIC BLOOD PRESSURE: 123 MMHG | TEMPERATURE: 97.5 F | WEIGHT: 151 LBS | RESPIRATION RATE: 20 BRPM | HEART RATE: 76 BPM

## 2025-03-05 DIAGNOSIS — L25.9 CONTACT DERMATITIS, UNSPECIFIED CONTACT DERMATITIS TYPE, UNSPECIFIED TRIGGER: ICD-10-CM

## 2025-03-05 DIAGNOSIS — J30.9 ALLERGIC RHINITIS, UNSPECIFIED SEASONALITY, UNSPECIFIED TRIGGER: ICD-10-CM

## 2025-03-05 DIAGNOSIS — L20.82 FLEXURAL ECZEMA: Primary | ICD-10-CM

## 2025-03-05 PROCEDURE — 99213 OFFICE O/P EST LOW 20 MIN: CPT | Performed by: NURSE PRACTITIONER

## 2025-03-05 RX ORDER — FEXOFENADINE HCL 180 MG/1
180 TABLET ORAL 2 TIMES DAILY
Qty: 180 TABLET | Refills: 4 | Status: SHIPPED | OUTPATIENT
Start: 2025-03-05 | End: 2025-06-03

## 2025-03-05 NOTE — PROGRESS NOTES
EHSAN Durán  [facility]   Family Medicine  [address]  [place] [address]  Phone: [phone]  Fax: [phone]         Chief Complaint:  Chief Complaint   Patient presents with    1-month follow up     Patient is following up on last diagnosis Flexural eczema. Patient stated that the medication helped but now it is starting to flair back up.        History:  Trinh Castelan is a 34 y.o. female.  History of Present Illness  The patient is a 34-year-old female who presents for a follow-up of eczema.    Eczema: She reports a slight recurrence of her eczema symptoms, although they are not as severe as before. She has been managing her condition with Singulair and Allegra 24-hour, the latter of which she takes once daily in the morning without experiencing any associated fatigue. However, she notes that the effects of Allegra seem to diminish by the end of the day, as evidenced by the return of nasal congestion.    MEDICATIONS  Singulair, Allegra       ROS:  Review of Systems   Constitutional:  Negative for chills, diaphoresis and fatigue.   Gastrointestinal:  Negative for abdominal pain, nausea and vomiting.   Genitourinary:  Negative for dysuria.   Skin:  Positive for rash.        Eczema of flare resolved minimal eczema noted to right axilla         reports that she has never smoked. She has never used smokeless tobacco. She reports that she does not drink alcohol and does not use drugs.    Current Outpatient Medications   Medication Instructions    clobetasol (TEMOVATE) 0.05 % ointment Please see attached for detailed directions    [facility] 12 Hour Nasal Decongestant 120 mg, Oral, Every 12 Hours    Desvenlafaxine Succinate ER 25 mg, Oral, Daily    doxycycline (MONODOX) 100 MG capsule TAKE TWICE A DAILY FOR 7 DAYS    fexofenadine (ALLEGRA ALLERGY) 180 mg, Oral, 2 Times Daily    fluocinonide (LIDEX) 0.05 % external solution APPLY 5-10 DROPS TO THE SCALP ONCE NIGHT ONLY WHEN  "AND WHERE RASH IS PRESENT.    fluticasone (CUTIVATE) 0.005 % ointment APPLY TO AFFECTED AREA BEHIND EARS AND UNDER ARMS WHEN AND WHERE RASH IS PRESENT.    montelukast (SINGULAIR) 10 mg, Oral, Every Night at Bedtime    mupirocin (BACTROBAN) 2 % ointment APPLY A THIN LAYER TWICE DAILY TO THE AFFECTED AREAS BEHIND EARS AS NEEDED.    naproxen (NAPROSYN) 500 mg, Oral, 2 Times Daily With Meals    pantoprazole (PROTONIX) 40 mg, Oral, Daily    predniSONE (DELTASONE) 20 MG tablet Take 1 tablet bid x 7 days, then 1.5 tablet daily x 7 days, then 1 tablet daily x 7 days, then 1/2 tablet daily.    tiZANidine (ZANAFLEX) 4 mg, Oral, Every Night at Bedtime    Tri-Lo-Mily 0.18/0.215/0.25 MG-25 MCG per tablet 1 tablet, Oral, Daily       OBJECTIVE:  /75 (BP Location: Left arm, Patient Position: Sitting, Cuff Size: Adult)   Pulse 76   Temp 97.5 °F (36.4 °C) (Infrared)   Resp 20   Ht 162.6 cm (64.02\")   Wt 68.5 kg (151 lb)   SpO2 99%   BMI 25.91 kg/m²    Physical Exam  Vitals and nursing note reviewed.   Constitutional:       Appearance: Normal appearance. She is well-developed.   HENT:      Head: Normocephalic and atraumatic.      Right Ear: Tympanic membrane, ear canal and external ear normal.      Left Ear: Tympanic membrane, ear canal and external ear normal.      Nose: Nose normal. No septal deviation, nasal tenderness or congestion.      Mouth/Throat:      Lips: Pink. No lesions.      Mouth: Mucous membranes are moist. No oral lesions.      Dentition: Normal dentition.      Pharynx: Oropharynx is clear. No pharyngeal swelling, oropharyngeal exudate or posterior oropharyngeal erythema.   Eyes:      General: Lids are normal. Vision grossly intact. No scleral icterus.        Right eye: No discharge.         Left eye: No discharge.      Extraocular Movements: Extraocular movements intact.      Conjunctiva/sclera: Conjunctivae normal.      Right eye: Right conjunctiva is not injected.      Left eye: Left conjunctiva is " not injected.      Pupils: Pupils are equal, round, and reactive to light.   Neck:      Thyroid: No thyroid mass.      Trachea: Trachea normal.   Cardiovascular:      Rate and Rhythm: Normal rate and regular rhythm.      Heart sounds: Normal heart sounds. No murmur heard.     No gallop.   Pulmonary:      Effort: Pulmonary effort is normal.      Breath sounds: Normal breath sounds and air entry. No wheezing, rhonchi or rales.   Musculoskeletal:         General: No tenderness or deformity. Normal range of motion.      Cervical back: Full passive range of motion without pain, normal range of motion and neck supple.      Thoracic back: Normal.      Right lower leg: No edema.      Left lower leg: No edema.   Skin:     General: Skin is warm and dry.      Coloration: Skin is not jaundiced.      Findings: Rash present.   Neurological:      Mental Status: She is alert and oriented to person, place, and time.      Sensory: Sensation is intact.      Motor: Motor function is intact.      Coordination: Coordination is intact.      Gait: Gait is intact.      Deep Tendon Reflexes: Reflexes are normal and symmetric.   Psychiatric:         Mood and Affect: Mood and affect normal.         Behavior: Behavior normal.         Judgment: Judgment normal.       Physical Exam  Lungs were auscultated.         Procedures    Results      Assessment/Plan:     Diagnoses and all orders for this visit:    1. Flexural eczema (Primary)    2. Allergic rhinitis, unspecified seasonality, unspecified trigger  -     fexofenadine (Allegra Allergy) 180 MG tablet; Take 1 tablet by mouth 2 (Two) Times a Day for 90 days.  Dispense: 180 tablet; Refill: 4    3. Contact dermatitis, unspecified contact dermatitis type, unspecified trigger          An After Visit Summary was printed and given to the patient at discharge.  No follow-ups on file.       Assessment & Plan  1. Eczema.  Her eczema has shown significant improvement since the last visit on 10/05/2025, but  there are signs of a potential flare-up. She will continue her current regimen of Singulair. She has been advised to increase her Allegra dosage to twice daily. If this adjustment does not yield noticeable improvement, she may consider adding Zyrtec to her treatment plan. The use of Benadryl was discussed but is not preferred due to its sedative effects. She will inform us of any updates from Dr. Robin's office, and her next follow-up appointment will be scheduled post-consultation with him.    I spent 25 minutes caring for Trinh on this date of service. This time includes time spent by me in the following activities: preparing for the visit, reviewing tests, performing a medically appropriate examination and/or evaluation, counseling and educating the patient/family/caregiver, documenting information in the medical record, independently interpreting results and communicating that information with the patient/family/caregiver, care coordination, and ordering medications         Angle MOSER 3/5/2025   Electronically signed.    Patient or patient representative verbalized consent for the use of Ambient Listening during the visit with  EHSAN Durán for chart documentation. 3/5/2025  21:25 CST

## 2025-04-26 DIAGNOSIS — J30.9 ALLERGIC RHINITIS, UNSPECIFIED SEASONALITY, UNSPECIFIED TRIGGER: ICD-10-CM

## 2025-04-28 RX ORDER — MONTELUKAST SODIUM 10 MG/1
10 TABLET ORAL
Qty: 90 TABLET | Refills: 1 | Status: SHIPPED | OUTPATIENT
Start: 2025-04-28

## 2025-04-28 NOTE — TELEPHONE ENCOUNTER
Rx Refill Note  Requested Prescriptions     Pending Prescriptions Disp Refills    montelukast (SINGULAIR) 10 MG tablet [Pharmacy Med Name: MONTELUKAST SOD 10 MG TABLET] 90 tablet 1     Sig: TAKE 1 TABLET BY MOUTH EVERY DAY AT NIGHT      Last office visit with prescribing clinician: 3/5/2025   Last telemedicine visit with prescribing clinician: Visit date not found   Next office visit with prescribing clinician: 5/21/2025     Ney Glover MA  04/28/25, 08:34 CDT

## 2025-05-27 ENCOUNTER — OFFICE VISIT (OUTPATIENT)
Dept: FAMILY MEDICINE CLINIC | Facility: CLINIC | Age: 35
End: 2025-05-27
Payer: COMMERCIAL

## 2025-05-27 ENCOUNTER — LAB (OUTPATIENT)
Dept: LAB | Facility: HOSPITAL | Age: 35
End: 2025-05-27
Payer: COMMERCIAL

## 2025-05-27 VITALS
DIASTOLIC BLOOD PRESSURE: 54 MMHG | OXYGEN SATURATION: 99 % | RESPIRATION RATE: 18 BRPM | WEIGHT: 152.4 LBS | BODY MASS INDEX: 26.02 KG/M2 | HEIGHT: 64 IN | TEMPERATURE: 97.9 F | HEART RATE: 90 BPM | SYSTOLIC BLOOD PRESSURE: 136 MMHG

## 2025-05-27 DIAGNOSIS — L40.8 PSORIASIS WITH PUSTULES: ICD-10-CM

## 2025-05-27 DIAGNOSIS — Z91.89 AT RISK FOR COMPLICATION OF PREGNANCY: ICD-10-CM

## 2025-05-27 DIAGNOSIS — L40.50 PSORIATIC ARTHRITIS: Primary | ICD-10-CM

## 2025-05-27 DIAGNOSIS — E55.9 VITAMIN D DEFICIENCY: ICD-10-CM

## 2025-05-27 DIAGNOSIS — F40.298 NEEDLE PHOBIA: ICD-10-CM

## 2025-05-27 DIAGNOSIS — L30.9 PSORIASIS-ECZEMA OVERLAP CONDITION: ICD-10-CM

## 2025-05-27 PROCEDURE — 36415 COLL VENOUS BLD VENIPUNCTURE: CPT

## 2025-05-27 PROCEDURE — 82306 VITAMIN D 25 HYDROXY: CPT

## 2025-05-27 RX ORDER — APREMILAST 10-20-30MG
KIT ORAL
Qty: 55 EACH | Refills: 0 | Status: SHIPPED | OUTPATIENT
Start: 2025-05-27

## 2025-05-27 RX ORDER — PIMECROLIMUS 10 MG/G
1 CREAM TOPICAL 2 TIMES DAILY
COMMUNITY
Start: 2025-04-02

## 2025-05-27 RX ORDER — DUPILUMAB 300 MG/2ML
300 INJECTION, SOLUTION SUBCUTANEOUS
COMMUNITY
Start: 2025-05-21

## 2025-05-27 NOTE — PROGRESS NOTES
EHSAN Durán  [facility]   Family Medicine  [address]  [place] [address]  Phone: [phone]  Fax: [phone]         Chief Complaint:  Chief Complaint   Patient presents with    Eczema        History:  Trinh Castelan is a 34 y.o. female.  History of Present Illness  The patient presents for evaluation of eczema.    She was last seen on 03/05/2025, at which time she experienced a mild recurrence of her eczema, less severe than previous episodes. She has been managing the condition with Singulair and Allegra 24-hour, noting that the efficacy of Allegra appears to diminish by the end of the day.  Painful lesions that do not itch are reported. A biopsy conducted behind her ear revealed a diagnosis of psoriasis. No other biopsies have been performed. Recently, joint pain has been experienced, to the extent that she was unable to bend her hand. Exposure to sunlight exacerbates her condition, causing the spots to swell and worsen. Lesions are also reported on her genitals. There are no lesions in her ears currently, although she has had them in the past. She was evaluated by Dr. Robin, who recommended Dupixent as a potential treatment. However, due to insurance issues, Elidel cream was prescribed, which resulted in a widespread breakout. Treatment with topical corticosteroids has been attempted without significant improvement.        ROS:  Review of Systems   Constitutional:  Positive for fatigue. Negative for chills and diaphoresis.   Gastrointestinal:  Negative for abdominal pain, nausea and vomiting.   Genitourinary:  Negative for dysuria.   Musculoskeletal:  Positive for arthralgias.   Skin:  Positive for rash.         Body surface area greater than 49%.        reports that she has never smoked. She has never been exposed to tobacco smoke. She has never used smokeless tobacco. She reports that she does not drink alcohol and does not use drugs.    Current Outpatient  "Medications   Medication Instructions    Apremilast (Otezla) 10 & 20 & 30 MG tablet therapy pack Take as directed.    clobetasol (TEMOVATE) 0.05 % ointment Please see attached for detailed directions    [facility] 12 Hour Nasal Decongestant 120 mg, Oral, Every 12 Hours    Desvenlafaxine Succinate ER 25 mg, Oral, Daily    doxycycline (MONODOX) 100 MG capsule TAKE TWICE A DAILY FOR 7 DAYS    Dupixent 300 mg, Every 14 Days    fexofenadine (ALLEGRA ALLERGY) 180 mg, Oral, 2 Times Daily    fluocinonide (LIDEX) 0.05 % external solution APPLY 5-10 DROPS TO THE SCALP ONCE NIGHT ONLY WHEN AND WHERE RASH IS PRESENT.    fluticasone (CUTIVATE) 0.005 % ointment APPLY TO AFFECTED AREA BEHIND EARS AND UNDER ARMS WHEN AND WHERE RASH IS PRESENT.    montelukast (SINGULAIR) 10 mg, Oral, Every Night at Bedtime    mupirocin (BACTROBAN) 2 % ointment APPLY A THIN LAYER TWICE DAILY TO THE AFFECTED AREAS BEHIND EARS AS NEEDED.    naproxen (NAPROSYN) 500 mg, Oral, 2 Times Daily With Meals    pantoprazole (PROTONIX) 40 mg, Oral, Daily    pimecrolimus (ELIDEL) 1 % cream 1 Application, 2 Times Daily    predniSONE (DELTASONE) 20 MG tablet Take 1 tablet bid x 7 days, then 1.5 tablet daily x 7 days, then 1 tablet daily x 7 days, then 1/2 tablet daily.    tiZANidine (ZANAFLEX) 4 mg, Oral, Every Night at Bedtime    Tri-Lo-Mily 0.18/0.215/0.25 MG-25 MCG per tablet 1 tablet, Oral, Daily       OBJECTIVE:  /54 (BP Location: Left arm, Patient Position: Sitting, Cuff Size: Adult)   Pulse 90   Temp 97.9 °F (36.6 °C) (Infrared)   Resp 18   Ht 162.6 cm (64.02\")   Wt 69.1 kg (152 lb 6.4 oz)   SpO2 99%   BMI 26.15 kg/m²    Physical Exam  Vitals and nursing note reviewed.   Constitutional:       Appearance: Normal appearance. She is well-developed.   HENT:      Head: Normocephalic and atraumatic.      Right Ear: Tympanic membrane, ear canal and external ear normal.      Left Ear: Tympanic membrane, ear canal and external ear normal.      Nose: Nose " normal. No septal deviation, nasal tenderness or congestion.      Mouth/Throat:      Lips: Pink. No lesions.      Mouth: Mucous membranes are moist. No oral lesions.      Dentition: Normal dentition.      Pharynx: Oropharynx is clear. No pharyngeal swelling, oropharyngeal exudate or posterior oropharyngeal erythema.   Eyes:      General: Lids are normal. Vision grossly intact. No scleral icterus.        Right eye: No discharge.         Left eye: No discharge.      Extraocular Movements: Extraocular movements intact.      Conjunctiva/sclera: Conjunctivae normal.      Right eye: Right conjunctiva is not injected.      Left eye: Left conjunctiva is not injected.      Pupils: Pupils are equal, round, and reactive to light.   Neck:      Thyroid: No thyroid mass.      Trachea: Trachea normal.   Cardiovascular:      Rate and Rhythm: Normal rate and regular rhythm.      Heart sounds: Normal heart sounds. No murmur heard.     No gallop.   Pulmonary:      Effort: Pulmonary effort is normal.      Breath sounds: Normal breath sounds and air entry. No wheezing, rhonchi or rales.   Musculoskeletal:         General: No tenderness or deformity. Normal range of motion.      Cervical back: Full passive range of motion without pain, normal range of motion and neck supple.      Thoracic back: Normal.      Right lower leg: No edema.      Left lower leg: No edema.   Skin:     General: Skin is warm and dry.      Coloration: Skin is not jaundiced.      Findings: Rash present.      Comments: Rash blanches on exam, rash is raised, and skin is rough with palpation.    Neurological:      Mental Status: She is alert and oriented to person, place, and time.      Sensory: Sensation is intact.      Motor: Motor function is intact.      Coordination: Coordination is intact.      Gait: Gait is intact.      Deep Tendon Reflexes: Reflexes are normal and symmetric.   Psychiatric:         Mood and Affect: Mood and affect normal.         Behavior:  Behavior normal.         Judgment: Judgment normal.       Physical Exam  Skin: Psoriasiform dermatitis with spring form pustules consistent with psoriasis. Total body surface area for plaque psoriasis is 49%.  Genitourinary: Psoriasis noted in genital area.         Procedures    Results  Labs   - Biopsy: Psoriasiform dermatitis, spongiform pustules consistent with psoriasis. The overwhelming pathologic features are supportive of psoriasis. Strict integration with clinical presentation is imperative for diagnostic diagnosis.        Assessment/Plan:     Diagnoses and all orders for this visit:    1. Psoriatic arthritis (Primary)  -     Apremilast (Otezla) 10 & 20 & 30 MG tablet therapy pack; Take as directed.  Dispense: 55 each; Refill: 0    2. Psoriasis with pustules  -     Apremilast (Otezla) 10 & 20 & 30 MG tablet therapy pack; Take as directed.  Dispense: 55 each; Refill: 0    3. Psoriasis-eczema overlap condition    4. Needle phobia  -     Apremilast (Otezla) 10 & 20 & 30 MG tablet therapy pack; Take as directed.  Dispense: 55 each; Refill: 0    5. At risk for complication of pregnancy  -     Apremilast (Otezla) 10 & 20 & 30 MG tablet therapy pack; Take as directed.  Dispense: 55 each; Refill: 0    6. Vitamin D deficiency  -     Vitamin D 25 hydroxy; Future          An After Visit Summary was printed and given to the patient at discharge.  Return in about 4 weeks (around 6/24/2025).       Assessment & Plan  1. Psoriasis.  - The patient's has extensive disease greater than 10% body surface area.  Patient's total body surface area affected by psoriasis is 49%.  Plaque locations are extensive and can be found in the diagram above.  She has associated psoriatic arthritis symptoms.  T  - Despite multiple attempts at topical therapies including Elidel 1% cream, clobetasol ointment, Lidex external solution, fluticasone ointment, triamcinolone ointment, and Elocon, there has been no significant improvement.  - She has  also undergone several rounds of steroids, with the most recent course completed on 02/05/2025. Previous steroid treatments were administered on 11/07/2024, 05/09/2024, 11/09/2023, 08/24/2023, 08/10/2023, 03/01/2023, 02/28/2023, and 02/28/2019.  -Patient has tried some phototherapy but did not note improvement and is not feasible.  She also has a needle phobia and would be at a higher risk of pregnancy with injectables.  Plan to move forward with Gonzalo.      I spent 74 minutes caring for Trinh on this date of service. This time includes time spent by me in the following activities: preparing for the visit, reviewing tests, performing a medically appropriate examination and/or evaluation, counseling and educating the patient/family/caregiver, documenting information in the medical record, independently interpreting results and communicating that information with the patient/family/caregiver, care coordination, ordering medications, and ordering test(s)         Angle MOSER 5/27/2025   Electronically signed.    Patient or patient representative verbalized consent for the use of Ambient Listening during the visit with  EHSAN Durán for chart documentation. 5/27/2025  18:50 CDT

## 2025-05-28 LAB — 25(OH)D3 SERPL-MCNC: 37.3 NG/ML (ref 30–100)

## 2025-06-06 DIAGNOSIS — L40.8 PSORIASIS WITH PUSTULES: ICD-10-CM

## 2025-06-06 DIAGNOSIS — F40.298 NEEDLE PHOBIA: ICD-10-CM

## 2025-06-06 DIAGNOSIS — Z91.89 AT RISK FOR COMPLICATION OF PREGNANCY: ICD-10-CM

## 2025-06-06 DIAGNOSIS — L30.9 PSORIASIS-ECZEMA OVERLAP CONDITION: ICD-10-CM

## 2025-06-06 DIAGNOSIS — L40.50 PSORIATIC ARTHRITIS: Primary | ICD-10-CM

## 2025-06-06 RX ORDER — APREMILAST 20 MG/1
20 TABLET, FILM COATED ORAL DAILY
Qty: 30 TABLET | Refills: 2 | Status: SHIPPED | OUTPATIENT
Start: 2025-06-06 | End: 2025-07-06

## 2025-06-11 ENCOUNTER — TELEPHONE (OUTPATIENT)
Dept: FAMILY MEDICINE CLINIC | Facility: CLINIC | Age: 35
End: 2025-06-11
Payer: COMMERCIAL

## 2025-06-11 NOTE — TELEPHONE ENCOUNTER
L/M for patient that we had to cancel her appt on 06- and to call the office and r/s her appointment Provider will be out of the office.  OK for Hub to schedule appointment when patient calls back

## 2025-06-17 ENCOUNTER — TELEPHONE (OUTPATIENT)
Dept: FAMILY MEDICINE CLINIC | Facility: CLINIC | Age: 35
End: 2025-06-17
Payer: COMMERCIAL

## 2025-06-17 NOTE — TELEPHONE ENCOUNTER
[facility] questioning script on this patient they are opened 8 am to 8 pm and the number to call is [phone]

## 2025-06-17 NOTE — TELEPHONE ENCOUNTER
Caller: Trinh Castelan    Relationship to patient: Self    Best call back number: [phone]     Patient is needing: THE PHARMACY SAID THIS SHOULD BE TWICE A DAY Apremilast (Otezla) 20 MG tablet       PLEASE CALL CALL [facility]

## 2025-06-18 ENCOUNTER — TELEPHONE (OUTPATIENT)
Dept: FAMILY MEDICINE CLINIC | Facility: CLINIC | Age: 35
End: 2025-06-18
Payer: COMMERCIAL

## 2025-06-18 ENCOUNTER — LAB (OUTPATIENT)
Dept: LAB | Facility: HOSPITAL | Age: 35
End: 2025-06-18
Payer: COMMERCIAL

## 2025-06-18 ENCOUNTER — OFFICE VISIT (OUTPATIENT)
Dept: FAMILY MEDICINE CLINIC | Facility: CLINIC | Age: 35
End: 2025-06-18
Payer: COMMERCIAL

## 2025-06-18 VITALS
HEART RATE: 107 BPM | OXYGEN SATURATION: 99 % | HEIGHT: 64 IN | TEMPERATURE: 96.9 F | WEIGHT: 149 LBS | BODY MASS INDEX: 25.44 KG/M2 | DIASTOLIC BLOOD PRESSURE: 70 MMHG | SYSTOLIC BLOOD PRESSURE: 118 MMHG

## 2025-06-18 DIAGNOSIS — R53.83 FATIGUE, UNSPECIFIED TYPE: ICD-10-CM

## 2025-06-18 DIAGNOSIS — R19.7 DIARRHEA, UNSPECIFIED TYPE: ICD-10-CM

## 2025-06-18 DIAGNOSIS — L40.50 PSORIATIC ARTHRITIS: Primary | ICD-10-CM

## 2025-06-18 LAB
BASOPHILS # BLD AUTO: 0.09 10*3/MM3 (ref 0–0.2)
BASOPHILS NFR BLD AUTO: 1.8 % (ref 0–1.5)
DEPRECATED RDW RBC AUTO: 38.6 FL (ref 37–54)
EOSINOPHIL # BLD AUTO: 0.11 10*3/MM3 (ref 0–0.4)
EOSINOPHIL NFR BLD AUTO: 2.3 % (ref 0.3–6.2)
ERYTHROCYTE [DISTWIDTH] IN BLOOD BY AUTOMATED COUNT: 12.1 % (ref 12.3–15.4)
FERRITIN SERPL-MCNC: 52.6 NG/ML (ref 13–150)
HCT VFR BLD AUTO: 41.1 % (ref 34–46.6)
HGB BLD-MCNC: 14 G/DL (ref 12–15.9)
IMM GRANULOCYTES # BLD AUTO: 0.01 10*3/MM3 (ref 0–0.05)
IMM GRANULOCYTES NFR BLD AUTO: 0.2 % (ref 0–0.5)
IRON 24H UR-MRATE: 77 MCG/DL (ref 37–145)
IRON SATN MFR SERPL: 17 % (ref 20–50)
LYMPHOCYTES # BLD AUTO: 1.24 10*3/MM3 (ref 0.7–3.1)
LYMPHOCYTES NFR BLD AUTO: 25.4 % (ref 19.6–45.3)
MCH RBC QN AUTO: 29.6 PG (ref 26.6–33)
MCHC RBC AUTO-ENTMCNC: 34.1 G/DL (ref 31.5–35.7)
MCV RBC AUTO: 86.9 FL (ref 79–97)
MONOCYTES # BLD AUTO: 0.23 10*3/MM3 (ref 0.1–0.9)
MONOCYTES NFR BLD AUTO: 4.7 % (ref 5–12)
NEUTROPHILS NFR BLD AUTO: 3.2 10*3/MM3 (ref 1.7–7)
NEUTROPHILS NFR BLD AUTO: 65.6 % (ref 42.7–76)
NRBC BLD AUTO-RTO: 0 /100 WBC (ref 0–0.2)
PLATELET # BLD AUTO: 231 10*3/MM3 (ref 140–450)
PMV BLD AUTO: 10 FL (ref 6–12)
RBC # BLD AUTO: 4.73 10*6/MM3 (ref 3.77–5.28)
TIBC SERPL-MCNC: 459 MCG/DL (ref 298–536)
TRANSFERRIN SERPL-MCNC: 308 MG/DL (ref 200–360)
WBC NRBC COR # BLD AUTO: 4.88 10*3/MM3 (ref 3.4–10.8)

## 2025-06-18 PROCEDURE — 82728 ASSAY OF FERRITIN: CPT

## 2025-06-18 PROCEDURE — 83540 ASSAY OF IRON: CPT

## 2025-06-18 PROCEDURE — 84466 ASSAY OF TRANSFERRIN: CPT

## 2025-06-18 PROCEDURE — 36415 COLL VENOUS BLD VENIPUNCTURE: CPT

## 2025-06-18 PROCEDURE — 85025 COMPLETE CBC W/AUTO DIFF WBC: CPT

## 2025-06-18 RX ORDER — APREMILAST 20 MG/1
20 TABLET, FILM COATED ORAL 2 TIMES DAILY
Qty: 60 TABLET | Refills: 2 | Status: SHIPPED | OUTPATIENT
Start: 2025-06-18 | End: 2025-07-18

## 2025-06-18 NOTE — PROGRESS NOTES
Saray Fernández,   [facility]   Family Medicine  [address]  [place] [address]  Phone: [phone]  Fax: [phone]         Chief Complaint:  Chief Complaint   Patient presents with    Medication Problem     Patient states pharmacy says otezla 20mg should be bid        History:  Trinh Castelan is a 34 y.o. female who presents for psoriasis follow up.      She was evaluated by Dr. Robin, who recommended Dupixent as a potential treatment. However, due to insurance issues, Elidel cream was prescribed, which resulted in a widespread breakout. Treatment with topical corticosteroids has been attempted without significant improvement. She was started on otezla at last visit 5/27 and given starting therapy pack. She reports significant GI distress since starting the 30mg BID dosing. They are been blood and loose, occurring 4-5 times daily for the last few days. Making her feel run down and tired.     Does have a history of rectal bleeding, had colonoscopy attributed to hemorrhoids.     HPI           reports that she has never smoked. She has never been exposed to tobacco smoke. She has never used smokeless tobacco. She reports that she does not drink alcohol and does not use drugs.    Current Outpatient Medications   Medication Instructions    clobetasol (TEMOVATE) 0.05 % ointment Please see attached for detailed directions    [facility] 12 Hour Nasal Decongestant 120 mg, Oral, Every 12 Hours    Desvenlafaxine Succinate ER 25 mg, Oral, Daily    doxycycline (MONODOX) 100 MG capsule TAKE TWICE A DAILY FOR 7 DAYS    Dupixent 300 mg, Every 14 Days    fexofenadine (ALLEGRA ALLERGY) 180 mg, Oral, 2 Times Daily    fluocinonide (LIDEX) 0.05 % external solution APPLY 5-10 DROPS TO THE SCALP ONCE NIGHT ONLY WHEN AND WHERE RASH IS PRESENT.    fluticasone (CUTIVATE) 0.005 % ointment APPLY TO AFFECTED AREA BEHIND EARS AND UNDER ARMS WHEN AND WHERE RASH IS PRESENT.    montelukast (SINGULAIR) 10 mg, Oral, Every  "Night at Bedtime    mupirocin (BACTROBAN) 2 % ointment APPLY A THIN LAYER TWICE DAILY TO THE AFFECTED AREAS BEHIND EARS AS NEEDED.    naproxen (NAPROSYN) 500 mg, Oral, 2 Times Daily With Meals    Otezla 20 mg, Oral, 2 Times Daily    pantoprazole (PROTONIX) 40 mg, Oral, Daily    pimecrolimus (ELIDEL) 1 % cream 1 Application, 2 Times Daily    predniSONE (DELTASONE) 20 MG tablet Take 1 tablet bid x 7 days, then 1.5 tablet daily x 7 days, then 1 tablet daily x 7 days, then 1/2 tablet daily.    tiZANidine (ZANAFLEX) 4 mg, Oral, Every Night at Bedtime    Tri-Lo-Mily 0.18/0.215/0.25 MG-25 MCG per tablet 1 tablet, Oral, Daily       OBJECTIVE:  /70   Pulse 107   Temp 96.9 °F (36.1 °C)   Ht 162.6 cm (64.02\")   Wt 67.6 kg (149 lb)   SpO2 99%   BMI 25.56 kg/m²      Gen: No acute distress.   Head and neck: Normocephalic, atraumatic.  HEENT: PERRLA, EOMI.  CV: Well perfused, no pallor.   Resp: Normal respiratory effort. No distress.   Musculoskeletal: No gross deformity.   Neuro: Alert and oriented.   Skin: Per patient, rash improved.  Psych: Appropriate.       Procedures    Assessment/Plan:     Diagnoses and all orders for this visit:    1. Psoriatic arthritis (Primary)  -     Apremilast (Otezla) 20 MG tablet; Take 20 mg by mouth 2 (Two) Times a Day for 30 days.  Dispense: 60 tablet; Refill: 2    2. Fatigue, unspecified type  -     CBC w AUTO Differential; Future  -     Iron and TIBC; Future  -     Ferritin; Future    3. Diarrhea, unspecified type      Started Otezla approximately 2 and half weeks ago.  In the last few days, she has had significant diarrhea, both loose and bloody.  She is starting to feel fatigued because of it.  She states that there has been a significant change in her skin and she would like to keep taking it if she can.  With this, we will try to back down on the dosing-plan for 20 mg twice daily, hoping to limit side effects, but still get intended benefit.    An After Visit Summary was " printed and given to the patient at discharge.  No follow-ups on file.         Saray Fernández DO  6/18/2025   Electronically signed.

## 2025-06-18 NOTE — TELEPHONE ENCOUNTER
Caller: [facility]    Relationship: Other    Best call back number:     Which medication are you concerned about: Apremilast (Otezla) 20 MG tablet [[id number]] (Order [id number])     Who prescribed you this medication: DR. LOERA    What are your concerns: PHARMACY CALLED WANTING TO CONFIRM THE DOSE INCREASE FOR OTEZLA AS PT WAS REACTING TO A LOWER DOSE. PLEASE ADVISE

## 2025-07-09 ENCOUNTER — OFFICE VISIT (OUTPATIENT)
Dept: FAMILY MEDICINE CLINIC | Facility: CLINIC | Age: 35
End: 2025-07-09
Payer: COMMERCIAL

## 2025-07-09 VITALS
DIASTOLIC BLOOD PRESSURE: 81 MMHG | SYSTOLIC BLOOD PRESSURE: 118 MMHG | HEIGHT: 64 IN | BODY MASS INDEX: 25.61 KG/M2 | WEIGHT: 150 LBS | RESPIRATION RATE: 20 BRPM | HEART RATE: 104 BPM | TEMPERATURE: 97.1 F | OXYGEN SATURATION: 98 %

## 2025-07-09 DIAGNOSIS — K21.9 GASTROESOPHAGEAL REFLUX DISEASE WITHOUT ESOPHAGITIS: ICD-10-CM

## 2025-07-09 DIAGNOSIS — M62.838 MUSCLE SPASM: ICD-10-CM

## 2025-07-09 DIAGNOSIS — J30.89 ALLERGIC RHINITIS DUE TO OTHER ALLERGIC TRIGGER, UNSPECIFIED SEASONALITY: ICD-10-CM

## 2025-07-09 DIAGNOSIS — L40.50 PSORIATIC ARTHRITIS: ICD-10-CM

## 2025-07-09 DIAGNOSIS — H65.90 SEROUS OTITIS MEDIA, UNSPECIFIED CHRONICITY, UNSPECIFIED LATERALITY: Primary | ICD-10-CM

## 2025-07-09 DIAGNOSIS — E55.9 VITAMIN D DEFICIENCY: ICD-10-CM

## 2025-07-09 PROCEDURE — 99214 OFFICE O/P EST MOD 30 MIN: CPT | Performed by: NURSE PRACTITIONER

## 2025-07-09 RX ORDER — AZELASTINE HYDROCHLORIDE, FLUTICASONE PROPIONATE 137; 50 UG/1; UG/1
SPRAY, METERED NASAL
Qty: 23 G | Refills: 11 | Status: SHIPPED | OUTPATIENT
Start: 2025-07-09

## 2025-07-09 RX ORDER — PANTOPRAZOLE SODIUM 40 MG/1
40 TABLET, DELAYED RELEASE ORAL DAILY
Qty: 90 TABLET | Refills: 4 | Status: SHIPPED | OUTPATIENT
Start: 2025-07-09

## 2025-07-09 NOTE — PROGRESS NOTES
EHSAN Durán  [facility]   Family Medicine  [address]  [place] [address]  Phone: [phone]  Fax: [phone]         Chief Complaint:  Chief Complaint   Patient presents with    Psoriatic arthritis     4-week follow up    Earache     Left ear        History:  Trinh Castelan is a 34 y.o. female.  History of Present Illness  The patient presents for a follow-up visit.    Psoriasis: She reports an improvement in her condition since starting Otezla, with complete resolution of symptoms when on the 30 mg dose. However, due to stomach issues, she reduced the dosage to 20 mg twice daily. Her skin is described as dry, and the itching has subsided. The condition fluctuates, with periods of improvement followed by flare-ups lasting a few days. She continues to take Allegra and Singulair. Elidel cream was discontinued after an allergic reaction on her stomach. Lidex was stopped for her scalp as the condition has resolved, although it persists behind her ear. Clobetasol ointment is used during severe flare-ups and is found effective. She is not currently using Dupixent, minoxidil, or doxycycline. No gastrointestinal issues, depression, or weight loss are reported. 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 Omega-3 supplements are being considered as an alternative.    She has been experiencing stabbing pain behind her ear that extends into her throat. No fever, runny nose, sore throat, or increased allergy symptoms are reported. Sudafed is taken in the morning as taking it at night disrupts sleep.    She requests refills for her pantoprazole and tizanidine prescriptions.       ROS:  Review of Systems   Constitutional:  Negative for fatigue, fever and unexpected weight change.   HENT:  Positive for ear pain and rhinorrhea. Negative for congestion, sinus pressure, sinus pain and voice change.    Eyes:  Negative  for visual disturbance.   Respiratory:  Negative for shortness of breath and wheezing.    Cardiovascular:  Negative for chest pain and palpitations.   Gastrointestinal:  Negative for abdominal pain, nausea and vomiting.   Genitourinary:  Negative for dysuria and flank pain.   Musculoskeletal:  Negative for back pain, myalgias and neck pain.   Skin:  Positive for rash. Negative for color change.   Neurological:  Negative for dizziness, weakness, numbness and headaches.   Psychiatric/Behavioral:  Negative for behavioral problems, dysphoric mood, self-injury and sleep disturbance.         reports that she has never smoked. She has never been exposed to tobacco smoke. She has never used smokeless tobacco. She reports that she does not drink alcohol and does not use drugs.    Current Outpatient Medications   Medication Instructions    amoxicillin-clavulanate (AUGMENTIN) 875-125 MG per tablet 1 tablet, Oral, 2 Times Daily    Azelastine-Fluticasone 137-50 MCG/ACT suspension One spray each nostril twice daily.    clobetasol (TEMOVATE) 0.05 % ointment Please see attached for detailed directions    [facility] 12 Hour Nasal Decongestant 120 mg, Oral, Every 12 Hours    Desvenlafaxine Succinate ER 25 mg, Oral, Daily    fexofenadine (ALLEGRA ALLERGY) 180 mg, Oral, 2 Times Daily    fluticasone (CUTIVATE) 0.005 % ointment APPLY TO AFFECTED AREA BEHIND EARS AND UNDER ARMS WHEN AND WHERE RASH IS PRESENT.    montelukast (SINGULAIR) 10 mg, Oral, Every Night at Bedtime    mupirocin (BACTROBAN) 2 % ointment APPLY A THIN LAYER TWICE DAILY TO THE AFFECTED AREAS BEHIND EARS AS NEEDED.    naproxen (NAPROSYN) 500 mg, Oral, 2 Times Daily With Meals    Otezla 20 mg, Oral, 2 Times Daily    pantoprazole (PROTONIX) 40 mg, Oral, Daily    tiZANidine (ZANAFLEX) 4 mg, Oral, Every Night at Bedtime    Tri-Lo-Mily 0.18/0.215/0.25 MG-25 MCG per tablet 1 tablet, Oral, Daily       OBJECTIVE:  /81 (BP Location: Left arm, Patient Position: Sitting, Cuff  "Size: Adult)   Pulse 104   Temp 97.1 °F (36.2 °C) (Infrared)   Resp 20   Ht 162.6 cm (64.02\")   Wt 68 kg (150 lb)   SpO2 98%   BMI 25.73 kg/m²    Physical Exam  Vitals and nursing note reviewed.   Constitutional:       Appearance: Normal appearance. She is well-developed.   HENT:      Head: Normocephalic and atraumatic.      Right Ear: Tympanic membrane, ear canal and external ear normal.      Left Ear: Tympanic membrane, ear canal and external ear normal.      Nose: Nose normal. No septal deviation, nasal tenderness or congestion.      Mouth/Throat:      Lips: Pink. No lesions.      Mouth: Mucous membranes are moist. No oral lesions.      Dentition: Normal dentition.      Pharynx: Oropharynx is clear. No pharyngeal swelling, oropharyngeal exudate or posterior oropharyngeal erythema.   Eyes:      General: Lids are normal. Vision grossly intact. No scleral icterus.        Right eye: No discharge.         Left eye: No discharge.      Extraocular Movements: Extraocular movements intact.      Conjunctiva/sclera: Conjunctivae normal.      Right eye: Right conjunctiva is not injected.      Left eye: Left conjunctiva is not injected.      Pupils: Pupils are equal, round, and reactive to light.   Neck:      Thyroid: No thyroid mass.      Trachea: Trachea normal.   Cardiovascular:      Rate and Rhythm: Normal rate and regular rhythm.      Heart sounds: Normal heart sounds. No murmur heard.     No gallop.   Pulmonary:      Effort: Pulmonary effort is normal.      Breath sounds: Normal breath sounds and air entry. No wheezing, rhonchi or rales.   Musculoskeletal:         General: No tenderness or deformity. Normal range of motion.      Cervical back: Full passive range of motion without pain, normal range of motion and neck supple.      Thoracic back: Normal.      Right lower leg: No edema.      Left lower leg: No edema.   Skin:     General: Skin is warm and dry.      Coloration: Skin is not jaundiced.      Findings: No " rash.   Neurological:      Mental Status: She is alert and oriented to person, place, and time.      Sensory: Sensation is intact.      Motor: Motor function is intact.      Coordination: Coordination is intact.      Gait: Gait is intact.      Deep Tendon Reflexes: Reflexes are normal and symmetric.   Psychiatric:         Mood and Affect: Mood and affect normal.         Behavior: Behavior normal.         Judgment: Judgment normal.       Physical Exam  Ears: Non-infected but under pressure  Skin: Improvement noted on arms; back and front examined  Other: Weight: 150 pounds         Procedures    Results      Assessment/Plan:     Diagnoses and all orders for this visit:    1. Serous otitis media, unspecified chronicity, unspecified laterality (Primary)  -     Azelastine-Fluticasone 137-50 MCG/ACT suspension; One spray each nostril twice daily.  Dispense: 23 g; Refill: 11  -     amoxicillin-clavulanate (AUGMENTIN) 875-125 MG per tablet; Take 1 tablet by mouth 2 (Two) Times a Day for 10 days.  Dispense: 20 tablet; Refill: 0    2. Allergic rhinitis due to other allergic trigger, unspecified seasonality  -     Azelastine-Fluticasone 137-50 MCG/ACT suspension; One spray each nostril twice daily.  Dispense: 23 g; Refill: 11    3. Psoriatic arthritis    4. Vitamin D deficiency    5. Gastroesophageal reflux disease without esophagitis  -     pantoprazole (PROTONIX) 40 MG EC tablet; Take 1 tablet by mouth Daily.  Dispense: 90 tablet; Refill: 4    6. Muscle spasm  -     tiZANidine (ZANAFLEX) 4 MG tablet; Take 1 tablet by mouth every night at bedtime.  Dispense: 90 tablet; Refill: 4          An After Visit Summary was printed and given to the patient at discharge.  Return in about 3 months (around 10/9/2025).       Assessment & Plan  1. Psoriasis.  - Symptoms suggest a possible combination of eczema and psoriatic arthritis.  - Physical exam shows improvement in arms, but persistent issues on the hairline and behind the ears.  -  Advised to gradually increase Otezla dosage from 20 mg to 25 mg by splitting a 10 mg tablet and taking half with the current 20 mg dose. Pause Otezla for 1 to 2 weeks before live vaccines.  - Clobetasol ointment recommended for hairline and behind ears. Daily multivitamin and lanolin-free vitamin D3 at 5000 IU prescribed. Referral to a rheumatologist if symptoms persist.    2. Ear pain.  - Reports stabbing pain from behind the ear into the throat, without fever, runny nose, sore throat, or increased allergy symptoms.  - Physical exam shows ears not infected but with pressure.  - Dymista nasal spray prescribed. Antibiotic prescription provided to start if symptoms do not improve within a few days.    3. Medication management.  - Refills for pantoprazole and tizanidine provided.    Follow-up  - A follow-up visit is scheduled in 3 months or sooner if necessary.    I spent 31 minutes caring for Trinh on this date of service. This time includes time spent by me in the following activities: preparing for the visit, reviewing tests, performing a medically appropriate examination and/or evaluation, counseling and educating the patient/family/caregiver, documenting information in the medical record, independently interpreting results and communicating that information with the patient/family/caregiver, care coordination, ordering medications, and ordering test(s)         Angle MOSER 7/11/2025   Electronically signed.    Patient or patient representative verbalized consent for the use of Ambient Listening during the visit with  EHSAN Durán for chart documentation. 7/11/2025  18:16 CDT

## 2025-08-28 RX ORDER — APREMILAST 20 MG/1
1 TABLET, FILM COATED ORAL EVERY 12 HOURS SCHEDULED
Qty: 60 TABLET | Refills: 2 | Status: SHIPPED | OUTPATIENT
Start: 2025-08-28

## (undated) DEVICE — Device: Brand: DEFENDO AIR/WATER/SUCTION AND BIOPSY VALVE

## (undated) DEVICE — YANKAUER,BULB TIP WITH VENT: Brand: ARGYLE

## (undated) DEVICE — MASK,OXYGEN,MED CONC,ADLT,7' TUB, UC: Brand: PENDING

## (undated) DEVICE — SENSR O2 OXIMAX FNGR A/ 18IN NONSTR

## (undated) DEVICE — THE CHANNEL CLEANING BRUSH IS A NYLON FLEXI BRUSH ATTACHED TO A FLEXIBLE PLASTIC SHEATH DESIGNED TO SAFELY REMOVE DEBRIS FROM FLEXIBLE ENDOSCOPES.